# Patient Record
Sex: MALE | Race: BLACK OR AFRICAN AMERICAN | NOT HISPANIC OR LATINO
[De-identification: names, ages, dates, MRNs, and addresses within clinical notes are randomized per-mention and may not be internally consistent; named-entity substitution may affect disease eponyms.]

---

## 2016-09-04 RX ORDER — METFORMIN HYDROCHLORIDE 850 MG/1
2 TABLET ORAL
Qty: 0 | Refills: 0 | COMMUNITY
Start: 2016-09-04 | End: 2016-10-04

## 2017-01-25 ENCOUNTER — APPOINTMENT (OUTPATIENT)
Dept: INTERNAL MEDICINE | Facility: CLINIC | Age: 67
End: 2017-01-25

## 2017-01-25 VITALS — WEIGHT: 195 LBS | BODY MASS INDEX: 30.09 KG/M2 | DIASTOLIC BLOOD PRESSURE: 98 MMHG | SYSTOLIC BLOOD PRESSURE: 170 MMHG

## 2017-01-25 LAB — HBA1C MFR BLD HPLC: 9.1

## 2017-01-26 ENCOUNTER — RESULT REVIEW (OUTPATIENT)
Age: 67
End: 2017-01-26

## 2017-01-26 LAB
ALBUMIN SERPL ELPH-MCNC: 4.6 G/DL
ALP BLD-CCNC: 151 U/L
ALT SERPL-CCNC: 74 U/L
AMYLASE/CREAT SERPL: 90 U/L
ANION GAP SERPL CALC-SCNC: 22 MMOL/L
AST SERPL-CCNC: 163 U/L
BILIRUB DIRECT SERPL-MCNC: 0.2 MG/DL
BILIRUB INDIRECT SERPL-MCNC: 0.4 MG/DL
BILIRUB SERPL-MCNC: 0.6 MG/DL
BUN SERPL-MCNC: 11 MG/DL
CALCIUM SERPL-MCNC: 10.2 MG/DL
CHLORIDE SERPL-SCNC: 93 MMOL/L
CHOLEST SERPL-MCNC: 288 MG/DL
CHOLEST/HDLC SERPL: 6.9 RATIO
CO2 SERPL-SCNC: 19 MMOL/L
CREAT SERPL-MCNC: 1.38 MG/DL
GLUCOSE SERPL-MCNC: 323 MG/DL
HDLC SERPL-MCNC: 42 MG/DL
LDLC SERPL CALC-MCNC: 183 MG/DL
LPL SERPL-CCNC: 88 U/L
POTASSIUM SERPL-SCNC: 4.3 MMOL/L
PROT SERPL-MCNC: 9 G/DL
SODIUM SERPL-SCNC: 134 MMOL/L
TRIGL SERPL-MCNC: 317 MG/DL
TSH SERPL-ACNC: 1.7 UIU/ML

## 2017-01-30 ENCOUNTER — RX RENEWAL (OUTPATIENT)
Age: 67
End: 2017-01-30

## 2017-01-31 ENCOUNTER — APPOINTMENT (OUTPATIENT)
Dept: INTERNAL MEDICINE | Facility: CLINIC | Age: 67
End: 2017-01-31

## 2017-02-01 ENCOUNTER — APPOINTMENT (OUTPATIENT)
Dept: INTERNAL MEDICINE | Facility: CLINIC | Age: 67
End: 2017-02-01

## 2017-02-23 ENCOUNTER — RESULT REVIEW (OUTPATIENT)
Age: 67
End: 2017-02-23

## 2017-02-23 ENCOUNTER — APPOINTMENT (OUTPATIENT)
Dept: INTERNAL MEDICINE | Facility: CLINIC | Age: 67
End: 2017-02-23

## 2017-02-23 ENCOUNTER — RX RENEWAL (OUTPATIENT)
Age: 67
End: 2017-02-23

## 2017-02-23 VITALS
SYSTOLIC BLOOD PRESSURE: 142 MMHG | BODY MASS INDEX: 30.25 KG/M2 | WEIGHT: 195 LBS | HEIGHT: 67.5 IN | DIASTOLIC BLOOD PRESSURE: 70 MMHG

## 2017-02-23 LAB — HBA1C MFR BLD HPLC: 9.1

## 2017-02-24 ENCOUNTER — RESULT REVIEW (OUTPATIENT)
Age: 67
End: 2017-02-24

## 2017-02-24 LAB
ALBUMIN SERPL ELPH-MCNC: 4.4 G/DL
ALP BLD-CCNC: 144 U/L
ALT SERPL-CCNC: 89 U/L
ANION GAP SERPL CALC-SCNC: 17 MMOL/L
AST SERPL-CCNC: 176 U/L
BASOPHILS # BLD AUTO: 0.06 K/UL
BASOPHILS NFR BLD AUTO: 0.6 %
BILIRUB DIRECT SERPL-MCNC: 0.2 MG/DL
BILIRUB INDIRECT SERPL-MCNC: 0.4 MG/DL
BILIRUB SERPL-MCNC: 0.6 MG/DL
BUN SERPL-MCNC: 14 MG/DL
CALCIUM SERPL-MCNC: 10.4 MG/DL
CHLORIDE SERPL-SCNC: 96 MMOL/L
CHOLEST SERPL-MCNC: 277 MG/DL
CHOLEST/HDLC SERPL: 6 RATIO
CO2 SERPL-SCNC: 23 MMOL/L
CREAT SERPL-MCNC: 1.22 MG/DL
EOSINOPHIL # BLD AUTO: 0.29 K/UL
EOSINOPHIL NFR BLD AUTO: 2.7 %
GLUCOSE SERPL-MCNC: 208 MG/DL
HCT VFR BLD CALC: 43.5 %
HDLC SERPL-MCNC: 46 MG/DL
HGB BLD-MCNC: 14.4 G/DL
IMM GRANULOCYTES NFR BLD AUTO: 0.4 %
LDLC SERPL CALC-MCNC: 176 MG/DL
LYMPHOCYTES # BLD AUTO: 3.28 K/UL
LYMPHOCYTES NFR BLD AUTO: 30.8 %
MAN DIFF?: NORMAL
MCHC RBC-ENTMCNC: 33.1 GM/DL
MCHC RBC-ENTMCNC: 33.5 PG
MCV RBC AUTO: 101.2 FL
MONOCYTES # BLD AUTO: 0.74 K/UL
MONOCYTES NFR BLD AUTO: 7 %
NEUTROPHILS # BLD AUTO: 6.23 K/UL
NEUTROPHILS NFR BLD AUTO: 58.5 %
PLATELET # BLD AUTO: 262 K/UL
POTASSIUM SERPL-SCNC: 4.8 MMOL/L
PROT SERPL-MCNC: 9 G/DL
RBC # BLD: 4.3 M/UL
RBC # FLD: 13.8 %
SODIUM SERPL-SCNC: 136 MMOL/L
TRIGL SERPL-MCNC: 274 MG/DL
TSH SERPL-ACNC: 1.89 UIU/ML
WBC # FLD AUTO: 10.64 K/UL

## 2017-03-09 ENCOUNTER — RX RENEWAL (OUTPATIENT)
Age: 67
End: 2017-03-09

## 2017-05-03 ENCOUNTER — APPOINTMENT (OUTPATIENT)
Dept: PULMONOLOGY | Facility: CLINIC | Age: 67
End: 2017-05-03

## 2017-07-03 ENCOUNTER — INPATIENT (INPATIENT)
Facility: HOSPITAL | Age: 67
LOS: 2 days | Discharge: HOME | DRG: 440 | End: 2017-07-06
Attending: INTERNAL MEDICINE | Admitting: FAMILY MEDICINE
Payer: MEDICARE

## 2017-07-03 VITALS
HEIGHT: 68 IN | DIASTOLIC BLOOD PRESSURE: 87 MMHG | WEIGHT: 184.97 LBS | TEMPERATURE: 97 F | OXYGEN SATURATION: 97 % | RESPIRATION RATE: 24 BRPM | HEART RATE: 92 BPM | SYSTOLIC BLOOD PRESSURE: 145 MMHG

## 2017-07-03 DIAGNOSIS — K85.90 ACUTE PANCREATITIS WITHOUT NECROSIS OR INFECTION, UNSPECIFIED: ICD-10-CM

## 2017-07-03 LAB
ALBUMIN SERPL ELPH-MCNC: 4.1 G/DL — SIGNIFICANT CHANGE UP (ref 3.3–5.2)
ALBUMIN SERPL ELPH-MCNC: 4.5 G/DL — SIGNIFICANT CHANGE UP (ref 3.3–5.2)
ALP SERPL-CCNC: 118 U/L — SIGNIFICANT CHANGE UP (ref 40–120)
ALP SERPL-CCNC: 128 U/L — HIGH (ref 40–120)
ALT FLD-CCNC: 52 U/L — HIGH
ALT FLD-CCNC: 59 U/L — HIGH
ANION GAP SERPL CALC-SCNC: 15 MMOL/L — SIGNIFICANT CHANGE UP (ref 5–17)
AST SERPL-CCNC: 74 U/L — HIGH
AST SERPL-CCNC: 90 U/L — HIGH
BASOPHILS # BLD AUTO: 0 K/UL — SIGNIFICANT CHANGE UP (ref 0–0.2)
BASOPHILS # BLD AUTO: 0.1 K/UL — SIGNIFICANT CHANGE UP (ref 0–0.2)
BASOPHILS NFR BLD AUTO: 0.2 % — SIGNIFICANT CHANGE UP (ref 0–2)
BASOPHILS NFR BLD AUTO: 0.6 % — SIGNIFICANT CHANGE UP (ref 0–2)
BILIRUB DIRECT SERPL-MCNC: 0.2 MG/DL — SIGNIFICANT CHANGE UP (ref 0–0.3)
BILIRUB INDIRECT FLD-MCNC: 0.4 MG/DL — SIGNIFICANT CHANGE UP (ref 0.2–1)
BILIRUB SERPL-MCNC: 0.4 MG/DL — SIGNIFICANT CHANGE UP (ref 0.4–2)
BILIRUB SERPL-MCNC: 0.6 MG/DL — SIGNIFICANT CHANGE UP (ref 0.4–2)
BUN SERPL-MCNC: 12 MG/DL — SIGNIFICANT CHANGE UP (ref 8–20)
CALCIUM SERPL-MCNC: 9.6 MG/DL — SIGNIFICANT CHANGE UP (ref 8.6–10.2)
CHLORIDE SERPL-SCNC: 96 MMOL/L — LOW (ref 98–107)
CO2 SERPL-SCNC: 24 MMOL/L — SIGNIFICANT CHANGE UP (ref 22–29)
CREAT SERPL-MCNC: 0.84 MG/DL — SIGNIFICANT CHANGE UP (ref 0.5–1.3)
EOSINOPHIL # BLD AUTO: 0.1 K/UL — SIGNIFICANT CHANGE UP (ref 0–0.5)
EOSINOPHIL # BLD AUTO: 0.5 K/UL — SIGNIFICANT CHANGE UP (ref 0–0.5)
EOSINOPHIL NFR BLD AUTO: 0.7 % — SIGNIFICANT CHANGE UP (ref 0–5)
EOSINOPHIL NFR BLD AUTO: 5.5 % — HIGH (ref 0–5)
GLUCOSE SERPL-MCNC: 190 MG/DL — HIGH (ref 70–115)
HCT VFR BLD CALC: 42.7 % — SIGNIFICANT CHANGE UP (ref 42–52)
HCT VFR BLD CALC: 43.8 % — SIGNIFICANT CHANGE UP (ref 42–52)
HGB BLD-MCNC: 14.9 G/DL — SIGNIFICANT CHANGE UP (ref 14–18)
HGB BLD-MCNC: 15.2 G/DL — SIGNIFICANT CHANGE UP (ref 14–18)
LIDOCAIN IGE QN: 788 U/L — HIGH (ref 22–51)
LYMPHOCYTES # BLD AUTO: 1.6 K/UL — SIGNIFICANT CHANGE UP (ref 1–4.8)
LYMPHOCYTES # BLD AUTO: 14.6 % — LOW (ref 20–55)
LYMPHOCYTES # BLD AUTO: 2.8 K/UL — SIGNIFICANT CHANGE UP (ref 1–4.8)
LYMPHOCYTES # BLD AUTO: 30.5 % — SIGNIFICANT CHANGE UP (ref 20–55)
MCHC RBC-ENTMCNC: 33.3 PG — HIGH (ref 27–31)
MCHC RBC-ENTMCNC: 33.4 PG — HIGH (ref 27–31)
MCHC RBC-ENTMCNC: 34.9 G/DL — SIGNIFICANT CHANGE UP (ref 32–36)
MCHC RBC-ENTMCNC: 34.9 G/DL — SIGNIFICANT CHANGE UP (ref 32–36)
MCV RBC AUTO: 95.5 FL — HIGH (ref 80–94)
MCV RBC AUTO: 95.6 FL — HIGH (ref 80–94)
MONOCYTES # BLD AUTO: 0.5 K/UL — SIGNIFICANT CHANGE UP (ref 0–0.8)
MONOCYTES # BLD AUTO: 0.7 K/UL — SIGNIFICANT CHANGE UP (ref 0–0.8)
MONOCYTES NFR BLD AUTO: 4.8 % — SIGNIFICANT CHANGE UP (ref 3–10)
MONOCYTES NFR BLD AUTO: 7.7 % — SIGNIFICANT CHANGE UP (ref 3–10)
NEUTROPHILS # BLD AUTO: 5.1 K/UL — SIGNIFICANT CHANGE UP (ref 1.8–8)
NEUTROPHILS # BLD AUTO: 8.5 K/UL — HIGH (ref 1.8–8)
NEUTROPHILS NFR BLD AUTO: 55.4 % — SIGNIFICANT CHANGE UP (ref 37–73)
NEUTROPHILS NFR BLD AUTO: 79.4 % — HIGH (ref 37–73)
PLATELET # BLD AUTO: 224 K/UL — SIGNIFICANT CHANGE UP (ref 150–400)
PLATELET # BLD AUTO: 263 K/UL — SIGNIFICANT CHANGE UP (ref 150–400)
POTASSIUM SERPL-MCNC: 4.6 MMOL/L — SIGNIFICANT CHANGE UP (ref 3.5–5.3)
POTASSIUM SERPL-SCNC: 4.6 MMOL/L — SIGNIFICANT CHANGE UP (ref 3.5–5.3)
PROT SERPL-MCNC: 8.3 G/DL — SIGNIFICANT CHANGE UP (ref 6.6–8.7)
PROT SERPL-MCNC: 9 G/DL — HIGH (ref 6.6–8.7)
RBC # BLD: 4.47 M/UL — LOW (ref 4.6–6.2)
RBC # BLD: 4.58 M/UL — LOW (ref 4.6–6.2)
RBC # FLD: 13 % — SIGNIFICANT CHANGE UP (ref 11–15.6)
RBC # FLD: 13.2 % — SIGNIFICANT CHANGE UP (ref 11–15.6)
SODIUM SERPL-SCNC: 135 MMOL/L — SIGNIFICANT CHANGE UP (ref 135–145)
TROPONIN T SERPL-MCNC: <0.01 NG/ML — SIGNIFICANT CHANGE UP (ref 0–0.06)
WBC # BLD: 10.6 K/UL — SIGNIFICANT CHANGE UP (ref 4.8–10.8)
WBC # BLD: 9.3 K/UL — SIGNIFICANT CHANGE UP (ref 4.8–10.8)
WBC # FLD AUTO: 10.6 K/UL — SIGNIFICANT CHANGE UP (ref 4.8–10.8)
WBC # FLD AUTO: 9.3 K/UL — SIGNIFICANT CHANGE UP (ref 4.8–10.8)

## 2017-07-03 PROCEDURE — 71020: CPT | Mod: 26

## 2017-07-03 PROCEDURE — 93010 ELECTROCARDIOGRAM REPORT: CPT

## 2017-07-03 PROCEDURE — 74177 CT ABD & PELVIS W/CONTRAST: CPT | Mod: 26

## 2017-07-03 PROCEDURE — 99285 EMERGENCY DEPT VISIT HI MDM: CPT | Mod: 25

## 2017-07-03 PROCEDURE — 99223 1ST HOSP IP/OBS HIGH 75: CPT

## 2017-07-03 RX ORDER — AMLODIPINE BESYLATE 2.5 MG/1
10 TABLET ORAL DAILY
Qty: 0 | Refills: 0 | Status: DISCONTINUED | OUTPATIENT
Start: 2017-07-03 | End: 2017-07-06

## 2017-07-03 RX ORDER — DEXTROSE 50 % IN WATER 50 %
12.5 SYRINGE (ML) INTRAVENOUS ONCE
Qty: 0 | Refills: 0 | Status: DISCONTINUED | OUTPATIENT
Start: 2017-07-03 | End: 2017-07-06

## 2017-07-03 RX ORDER — FOLIC ACID 0.8 MG
1 TABLET ORAL DAILY
Qty: 0 | Refills: 0 | Status: DISCONTINUED | OUTPATIENT
Start: 2017-07-03 | End: 2017-07-06

## 2017-07-03 RX ORDER — HYDROMORPHONE HYDROCHLORIDE 2 MG/ML
1 INJECTION INTRAMUSCULAR; INTRAVENOUS; SUBCUTANEOUS ONCE
Qty: 0 | Refills: 0 | Status: DISCONTINUED | OUTPATIENT
Start: 2017-07-03 | End: 2017-07-03

## 2017-07-03 RX ORDER — GLUCAGON INJECTION, SOLUTION 0.5 MG/.1ML
1 INJECTION, SOLUTION SUBCUTANEOUS ONCE
Qty: 0 | Refills: 0 | Status: DISCONTINUED | OUTPATIENT
Start: 2017-07-03 | End: 2017-07-06

## 2017-07-03 RX ORDER — METOCLOPRAMIDE HCL 10 MG
10 TABLET ORAL ONCE
Qty: 0 | Refills: 0 | Status: COMPLETED | OUTPATIENT
Start: 2017-07-03 | End: 2017-07-03

## 2017-07-03 RX ORDER — MORPHINE SULFATE 50 MG/1
4 CAPSULE, EXTENDED RELEASE ORAL ONCE
Qty: 0 | Refills: 0 | Status: DISCONTINUED | OUTPATIENT
Start: 2017-07-03 | End: 2017-07-03

## 2017-07-03 RX ORDER — ENOXAPARIN SODIUM 100 MG/ML
40 INJECTION SUBCUTANEOUS EVERY 24 HOURS
Qty: 0 | Refills: 0 | Status: DISCONTINUED | OUTPATIENT
Start: 2017-07-03 | End: 2017-07-06

## 2017-07-03 RX ORDER — FAMOTIDINE 10 MG/ML
20 INJECTION INTRAVENOUS ONCE
Qty: 0 | Refills: 0 | Status: COMPLETED | OUTPATIENT
Start: 2017-07-03 | End: 2017-07-03

## 2017-07-03 RX ORDER — SODIUM CHLORIDE 9 MG/ML
1000 INJECTION, SOLUTION INTRAVENOUS
Qty: 0 | Refills: 0 | Status: DISCONTINUED | OUTPATIENT
Start: 2017-07-03 | End: 2017-07-06

## 2017-07-03 RX ORDER — THIAMINE MONONITRATE (VIT B1) 100 MG
100 TABLET ORAL DAILY
Qty: 0 | Refills: 0 | Status: DISCONTINUED | OUTPATIENT
Start: 2017-07-03 | End: 2017-07-06

## 2017-07-03 RX ORDER — PANTOPRAZOLE SODIUM 20 MG/1
40 TABLET, DELAYED RELEASE ORAL
Qty: 0 | Refills: 0 | Status: DISCONTINUED | OUTPATIENT
Start: 2017-07-03 | End: 2017-07-06

## 2017-07-03 RX ORDER — MORPHINE SULFATE 50 MG/1
4 CAPSULE, EXTENDED RELEASE ORAL EVERY 4 HOURS
Qty: 0 | Refills: 0 | Status: DISCONTINUED | OUTPATIENT
Start: 2017-07-03 | End: 2017-07-06

## 2017-07-03 RX ORDER — DEXTROSE 50 % IN WATER 50 %
1 SYRINGE (ML) INTRAVENOUS ONCE
Qty: 0 | Refills: 0 | Status: DISCONTINUED | OUTPATIENT
Start: 2017-07-03 | End: 2017-07-06

## 2017-07-03 RX ORDER — SODIUM CHLORIDE 9 MG/ML
1000 INJECTION INTRAMUSCULAR; INTRAVENOUS; SUBCUTANEOUS ONCE
Qty: 0 | Refills: 0 | Status: COMPLETED | OUTPATIENT
Start: 2017-07-03 | End: 2017-07-03

## 2017-07-03 RX ORDER — INSULIN LISPRO 100/ML
VIAL (ML) SUBCUTANEOUS
Qty: 0 | Refills: 0 | Status: DISCONTINUED | OUTPATIENT
Start: 2017-07-03 | End: 2017-07-06

## 2017-07-03 RX ORDER — IPRATROPIUM/ALBUTEROL SULFATE 18-103MCG
3 AEROSOL WITH ADAPTER (GRAM) INHALATION EVERY 6 HOURS
Qty: 0 | Refills: 0 | Status: DISCONTINUED | OUTPATIENT
Start: 2017-07-03 | End: 2017-07-06

## 2017-07-03 RX ORDER — SODIUM CHLORIDE 9 MG/ML
1000 INJECTION INTRAMUSCULAR; INTRAVENOUS; SUBCUTANEOUS
Qty: 0 | Refills: 0 | Status: DISCONTINUED | OUTPATIENT
Start: 2017-07-03 | End: 2017-07-05

## 2017-07-03 RX ORDER — SODIUM CHLORIDE 9 MG/ML
3 INJECTION INTRAMUSCULAR; INTRAVENOUS; SUBCUTANEOUS ONCE
Qty: 0 | Refills: 0 | Status: COMPLETED | OUTPATIENT
Start: 2017-07-03 | End: 2017-07-03

## 2017-07-03 RX ADMIN — ENOXAPARIN SODIUM 40 MILLIGRAM(S): 100 INJECTION SUBCUTANEOUS at 11:13

## 2017-07-03 RX ADMIN — MORPHINE SULFATE 4 MILLIGRAM(S): 50 CAPSULE, EXTENDED RELEASE ORAL at 17:28

## 2017-07-03 RX ADMIN — Medication 1 TABLET(S): at 11:13

## 2017-07-03 RX ADMIN — MORPHINE SULFATE 4 MILLIGRAM(S): 50 CAPSULE, EXTENDED RELEASE ORAL at 21:58

## 2017-07-03 RX ADMIN — HYDROMORPHONE HYDROCHLORIDE 1 MILLIGRAM(S): 2 INJECTION INTRAMUSCULAR; INTRAVENOUS; SUBCUTANEOUS at 05:32

## 2017-07-03 RX ADMIN — Medication 100 MILLIGRAM(S): at 11:24

## 2017-07-03 RX ADMIN — Medication 3 MILLILITER(S): at 20:05

## 2017-07-03 RX ADMIN — HYDROMORPHONE HYDROCHLORIDE 1 MILLIGRAM(S): 2 INJECTION INTRAMUSCULAR; INTRAVENOUS; SUBCUTANEOUS at 05:24

## 2017-07-03 RX ADMIN — SODIUM CHLORIDE 150 MILLILITER(S): 9 INJECTION INTRAMUSCULAR; INTRAVENOUS; SUBCUTANEOUS at 06:58

## 2017-07-03 RX ADMIN — MORPHINE SULFATE 4 MILLIGRAM(S): 50 CAPSULE, EXTENDED RELEASE ORAL at 11:54

## 2017-07-03 RX ADMIN — MORPHINE SULFATE 4 MILLIGRAM(S): 50 CAPSULE, EXTENDED RELEASE ORAL at 13:17

## 2017-07-03 RX ADMIN — SODIUM CHLORIDE 1000 MILLILITER(S): 9 INJECTION INTRAMUSCULAR; INTRAVENOUS; SUBCUTANEOUS at 05:23

## 2017-07-03 RX ADMIN — SODIUM CHLORIDE 150 MILLILITER(S): 9 INJECTION INTRAMUSCULAR; INTRAVENOUS; SUBCUTANEOUS at 22:00

## 2017-07-03 RX ADMIN — MORPHINE SULFATE 4 MILLIGRAM(S): 50 CAPSULE, EXTENDED RELEASE ORAL at 13:55

## 2017-07-03 RX ADMIN — PANTOPRAZOLE SODIUM 40 MILLIGRAM(S): 20 TABLET, DELAYED RELEASE ORAL at 11:24

## 2017-07-03 RX ADMIN — SODIUM CHLORIDE 3 MILLILITER(S): 9 INJECTION INTRAMUSCULAR; INTRAVENOUS; SUBCUTANEOUS at 05:24

## 2017-07-03 RX ADMIN — Medication 10 MILLIGRAM(S): at 05:23

## 2017-07-03 RX ADMIN — Medication 3 MILLILITER(S): at 15:01

## 2017-07-03 RX ADMIN — MORPHINE SULFATE 4 MILLIGRAM(S): 50 CAPSULE, EXTENDED RELEASE ORAL at 11:16

## 2017-07-03 RX ADMIN — MORPHINE SULFATE 4 MILLIGRAM(S): 50 CAPSULE, EXTENDED RELEASE ORAL at 22:31

## 2017-07-03 RX ADMIN — Medication 1 MILLIGRAM(S): at 11:13

## 2017-07-03 RX ADMIN — AMLODIPINE BESYLATE 10 MILLIGRAM(S): 2.5 TABLET ORAL at 11:13

## 2017-07-03 RX ADMIN — FAMOTIDINE 20 MILLIGRAM(S): 10 INJECTION INTRAVENOUS at 05:23

## 2017-07-03 RX ADMIN — HYDROMORPHONE HYDROCHLORIDE 1 MILLIGRAM(S): 2 INJECTION INTRAMUSCULAR; INTRAVENOUS; SUBCUTANEOUS at 06:57

## 2017-07-03 NOTE — H&P ADULT - HISTORY OF PRESENT ILLNESS
66 yo male with PMH of HTN, DM, COPD, Hx of pancreatitis, last episode was in September 2016, Pt drinks alcohol on reguar basis, , Pt reports he was drinking alcohol yesterday with his wife, he started having epigastric pain radiating to back associated with nauseated feeling, Pt denies chest pain, headache, urinary problem, V/D,

## 2017-07-03 NOTE — ED ADULT NURSE NOTE - OBJECTIVE STATEMENT
pt here with abd pain he states has pancreatitis and he drunk too much, abd distended and sensitive to touch to epigestirc area radiating to left upper quadrant

## 2017-07-03 NOTE — ED ADULT NURSE NOTE - PMH
Alcohol abuse, daily use  Daily Use since 2010  Alcohol-induced acute pancreatitis, unspecified complication status    Diabetes mellitus    Emphysema    Hypertension    Lumbar disc herniation  L4-L5  Pancreatitis

## 2017-07-03 NOTE — ED PROVIDER NOTE - MEDICAL DECISION MAKING DETAILS
epigastric pain  - h&p suggest pancreatitis  - check labs, CT, and re-eavluate after iv fluids and meds

## 2017-07-03 NOTE — ED PROVIDER NOTE - CONSTITUTIONAL, MLM
normal... Well appearing, well nourished, awake, alert, oriented to person, place, time/situation. pt in pain.

## 2017-07-03 NOTE — ED PROVIDER NOTE - PROGRESS NOTE DETAILS
Pt signed out to me at 7 AM by Dr. Lowe to f/u CT abdomen and reassess. Pt still c/o significant pain and has TTP in abdomen.  Plan to admit.  PMD Ventura

## 2017-07-03 NOTE — H&P ADULT - ASSESSMENT
66 yo male with PMH of HTN, DM, COPD, Hx of pancreatitis, last episode was in September 2016, Pt drinks alcohol on reguar basis, , Pt reports he was drinking alcohol yesterday with his wife, he started having epigastric pain radiating to back associated with nauseated feeling, Pt denies chest pain, headache, urinary problem, V/D,     1- Acute Pancreatitis  CT abdomin- acute pancreatitis, Lipase- 788, c/w ivf and morphine for pain.  repeat labs    2-DM  Hold off Metfomin for now, RISS, check glucose    3-HTN  Pt reports he takes amlodpine 10mg, c/w amlodipine    4-COPD-  C/W Dueneb     DVT prohylaxis- Lovenox, and GI prophylaxis

## 2017-07-03 NOTE — SBIRT NOTE. - NSSBIRTSERVICES_GEN_A_ED_FT
Provided SBIRT services: Full screen Negative. Positive reinforcement provided given patient currently within healthy guidelines. Education materials reviewed and given to patient.  AUDIT Score:  11  DAST Score:  0  Duration=  15 Minutes Provided SBIRT services: Full screen positive. Brief Intervention Performed. Screening results were reviewed with the patient and patient was provided information about healthy guidelines and potential negative consequences associated with level of risk. Motivation and readiness to reduce or stop use was discussed and goals and activities to make changes were suggested/offered.    AUDIT Score:  11  DAST Score:  0  Duration=  15 Minutes

## 2017-07-03 NOTE — ED PROVIDER NOTE - OBJECTIVE STATEMENT
68 yo male c/o epigastric pain radiating to back + n/v.   symptoms started yesterday after drinking alcohol.  similar episodes in past - diagnosed as pancreatitis.

## 2017-07-03 NOTE — H&P ADULT - NSHPPHYSICALEXAM_GEN_ALL_CORE
PHYSICAL EXAM:  Vital Signs Last 24 Hrs  T(C): 36.2 (03 Jul 2017 10:13), Max: 36.3 (03 Jul 2017 04:32)  T(F): 97.2 (03 Jul 2017 10:13), Max: 97.4 (03 Jul 2017 04:32)  HR: 89 (03 Jul 2017 10:13) (89 - 92)  BP: 146/80 (03 Jul 2017 10:13) (145/87 - 146/80)  BP(mean): --  RR: 18 (03 Jul 2017 10:13) (18 - 24)  SpO2: 98% (03 Jul 2017 10:13) (97% - 98%)  GENERAL: NAD, well-groomed, well-developed  HEAD:  Atraumatic, Normocephalic  EYES: EOMI, PERRLA, conjunctiva and sclera clear  ENMT: No tonsillar erythema, exudates, or enlargement; Moist mucous membranes, Good dentition, No lesions  NECK: Supple, No JVD, Normal thyroid  NERVOUS SYSTEM:  Alert & Oriented X3, Good concentration; Motor Strength 5/5 B/L upper and lower extremities; DTRs 2+ intact and symmetric  CHEST/LUNG: Clear to percussion bilaterally; No rales, rhonchi, wheezing, or rubs  HEART: Regular rate and rhythm; No murmurs, rubs, or gallops  ABDOMEN: Soft, tenderness + in mid abdominal area, no rebound tenderness . BS+  EXTREMITIES:  2+ Peripheral Pulses, No clubbing, cyanosis, or edema  LYMPH: No lymphadenopathy noted  SKIN: No rashes or lesions

## 2017-07-03 NOTE — ED PROVIDER NOTE - CARE PLAN
Principal Discharge DX:	Pancreatitis  Secondary Diagnosis:	Alcohol-induced acute pancreatitis, unspecified complication status

## 2017-07-04 DIAGNOSIS — J43.9 EMPHYSEMA, UNSPECIFIED: ICD-10-CM

## 2017-07-04 DIAGNOSIS — E11.49 TYPE 2 DIABETES MELLITUS WITH OTHER DIABETIC NEUROLOGICAL COMPLICATION: ICD-10-CM

## 2017-07-04 DIAGNOSIS — K85.20 ALCOHOL INDUCED ACUTE PANCREATITIS WITHOUT NECROSIS OR INFECTION: ICD-10-CM

## 2017-07-04 DIAGNOSIS — I10 ESSENTIAL (PRIMARY) HYPERTENSION: ICD-10-CM

## 2017-07-04 DIAGNOSIS — F10.10 ALCOHOL ABUSE, UNCOMPLICATED: ICD-10-CM

## 2017-07-04 LAB
ALBUMIN SERPL ELPH-MCNC: 3.7 G/DL — SIGNIFICANT CHANGE UP (ref 3.3–5.2)
ALP SERPL-CCNC: 110 U/L — SIGNIFICANT CHANGE UP (ref 40–120)
ALT FLD-CCNC: 41 U/L — HIGH
AMYLASE P1 CFR SERPL: 711 U/L — HIGH (ref 36–128)
ANION GAP SERPL CALC-SCNC: 15 MMOL/L — SIGNIFICANT CHANGE UP (ref 5–17)
AST SERPL-CCNC: 58 U/L — HIGH
BILIRUB SERPL-MCNC: 0.8 MG/DL — SIGNIFICANT CHANGE UP (ref 0.4–2)
BUN SERPL-MCNC: 10 MG/DL — SIGNIFICANT CHANGE UP (ref 8–20)
CALCIUM SERPL-MCNC: 8.7 MG/DL — SIGNIFICANT CHANGE UP (ref 8.6–10.2)
CHLORIDE SERPL-SCNC: 104 MMOL/L — SIGNIFICANT CHANGE UP (ref 98–107)
CO2 SERPL-SCNC: 21 MMOL/L — LOW (ref 22–29)
CREAT SERPL-MCNC: 0.77 MG/DL — SIGNIFICANT CHANGE UP (ref 0.5–1.3)
GLUCOSE SERPL-MCNC: 161 MG/DL — HIGH (ref 70–115)
HBA1C BLD-MCNC: 7.1 % — HIGH (ref 4–5.6)
HCT VFR BLD CALC: 43.3 % — SIGNIFICANT CHANGE UP (ref 42–52)
HGB BLD-MCNC: 14.5 G/DL — SIGNIFICANT CHANGE UP (ref 14–18)
LIDOCAIN IGE QN: 1421 U/L — HIGH (ref 22–51)
MCHC RBC-ENTMCNC: 32.3 PG — HIGH (ref 27–31)
MCHC RBC-ENTMCNC: 33.5 G/DL — SIGNIFICANT CHANGE UP (ref 32–36)
MCV RBC AUTO: 96.4 FL — HIGH (ref 80–94)
PLATELET # BLD AUTO: 212 K/UL — SIGNIFICANT CHANGE UP (ref 150–400)
POTASSIUM SERPL-MCNC: 3.8 MMOL/L — SIGNIFICANT CHANGE UP (ref 3.5–5.3)
POTASSIUM SERPL-SCNC: 3.8 MMOL/L — SIGNIFICANT CHANGE UP (ref 3.5–5.3)
PROT SERPL-MCNC: 7.7 G/DL — SIGNIFICANT CHANGE UP (ref 6.6–8.7)
RBC # BLD: 4.49 M/UL — LOW (ref 4.6–6.2)
RBC # FLD: 13.1 % — SIGNIFICANT CHANGE UP (ref 11–15.6)
SODIUM SERPL-SCNC: 140 MMOL/L — SIGNIFICANT CHANGE UP (ref 135–145)
WBC # BLD: 11.3 K/UL — HIGH (ref 4.8–10.8)
WBC # FLD AUTO: 11.3 K/UL — HIGH (ref 4.8–10.8)

## 2017-07-04 PROCEDURE — 99233 SBSQ HOSP IP/OBS HIGH 50: CPT

## 2017-07-04 RX ORDER — ACETAMINOPHEN 500 MG
650 TABLET ORAL EVERY 6 HOURS
Qty: 0 | Refills: 0 | Status: DISCONTINUED | OUTPATIENT
Start: 2017-07-04 | End: 2017-07-06

## 2017-07-04 RX ADMIN — MORPHINE SULFATE 4 MILLIGRAM(S): 50 CAPSULE, EXTENDED RELEASE ORAL at 19:58

## 2017-07-04 RX ADMIN — MORPHINE SULFATE 4 MILLIGRAM(S): 50 CAPSULE, EXTENDED RELEASE ORAL at 08:23

## 2017-07-04 RX ADMIN — Medication 3 MILLILITER(S): at 08:29

## 2017-07-04 RX ADMIN — MORPHINE SULFATE 4 MILLIGRAM(S): 50 CAPSULE, EXTENDED RELEASE ORAL at 20:20

## 2017-07-04 RX ADMIN — Medication 3 MILLILITER(S): at 20:44

## 2017-07-04 RX ADMIN — Medication 650 MILLIGRAM(S): at 11:30

## 2017-07-04 RX ADMIN — PANTOPRAZOLE SODIUM 40 MILLIGRAM(S): 20 TABLET, DELAYED RELEASE ORAL at 08:23

## 2017-07-04 RX ADMIN — SODIUM CHLORIDE 150 MILLILITER(S): 9 INJECTION INTRAMUSCULAR; INTRAVENOUS; SUBCUTANEOUS at 05:59

## 2017-07-04 RX ADMIN — AMLODIPINE BESYLATE 10 MILLIGRAM(S): 2.5 TABLET ORAL at 05:59

## 2017-07-04 RX ADMIN — Medication 3 MILLILITER(S): at 03:52

## 2017-07-04 RX ADMIN — MORPHINE SULFATE 4 MILLIGRAM(S): 50 CAPSULE, EXTENDED RELEASE ORAL at 13:49

## 2017-07-04 RX ADMIN — MORPHINE SULFATE 4 MILLIGRAM(S): 50 CAPSULE, EXTENDED RELEASE ORAL at 12:59

## 2017-07-04 RX ADMIN — Medication 3 MILLILITER(S): at 15:28

## 2017-07-04 RX ADMIN — HYDROMORPHONE HYDROCHLORIDE 1 MILLIGRAM(S): 2 INJECTION INTRAMUSCULAR; INTRAVENOUS; SUBCUTANEOUS at 08:10

## 2017-07-04 NOTE — ED ADULT NURSE REASSESSMENT NOTE - NS ED NURSE REASSESS COMMENT FT1
assumed pt care this am. pt alert, resting in bed, chart reviewed.
Patient received awake and alert x3 in cart.  Patient MARTIN.  Patient has no labored breathing, patient is in no acute distress.  Patient 10/10 abdominal pain and patient was just medicated with Morphine.  Patient has NS infusing at 100ml/hr into patent iv site in right ac.
Pt resting comfortably, easy arousability, offers no complaints at this time, reports feeling "much better",, safety and comfort measures in place.
Report recvd from off going RN, pt recvd sitting on stretcher, audible wheezing noted, RT called to bedside for treatment, pt reports understanding of POC, awaiting a bed assignment. Pt receiving respiratory treatment as ordered. Safety and comfort measures in place.
Patient resting in cart, awake and alert x3.  Patient has no labored breathing and is in no acute distress.

## 2017-07-05 LAB
AMYLASE P1 CFR SERPL: 236 U/L — HIGH (ref 36–128)
ANION GAP SERPL CALC-SCNC: 17 MMOL/L — SIGNIFICANT CHANGE UP (ref 5–17)
BUN SERPL-MCNC: 8 MG/DL — SIGNIFICANT CHANGE UP (ref 8–20)
CALCIUM SERPL-MCNC: 8.6 MG/DL — SIGNIFICANT CHANGE UP (ref 8.6–10.2)
CHLORIDE SERPL-SCNC: 103 MMOL/L — SIGNIFICANT CHANGE UP (ref 98–107)
CO2 SERPL-SCNC: 21 MMOL/L — LOW (ref 22–29)
CREAT SERPL-MCNC: 0.88 MG/DL — SIGNIFICANT CHANGE UP (ref 0.5–1.3)
GLUCOSE SERPL-MCNC: 140 MG/DL — HIGH (ref 70–115)
HCT VFR BLD CALC: 39.5 % — LOW (ref 42–52)
HGB BLD-MCNC: 13.5 G/DL — LOW (ref 14–18)
LIDOCAIN IGE QN: 201 U/L — HIGH (ref 22–51)
MAGNESIUM SERPL-MCNC: 2.1 MG/DL — SIGNIFICANT CHANGE UP (ref 1.6–2.6)
MCHC RBC-ENTMCNC: 33.2 PG — HIGH (ref 27–31)
MCHC RBC-ENTMCNC: 34.2 G/DL — SIGNIFICANT CHANGE UP (ref 32–36)
MCV RBC AUTO: 97.1 FL — HIGH (ref 80–94)
PHOSPHATE SERPL-MCNC: 2.4 MG/DL — SIGNIFICANT CHANGE UP (ref 2.4–4.7)
PLATELET # BLD AUTO: 227 K/UL — SIGNIFICANT CHANGE UP (ref 150–400)
POTASSIUM SERPL-MCNC: 3.6 MMOL/L — SIGNIFICANT CHANGE UP (ref 3.5–5.3)
POTASSIUM SERPL-SCNC: 3.6 MMOL/L — SIGNIFICANT CHANGE UP (ref 3.5–5.3)
RBC # BLD: 4.07 M/UL — LOW (ref 4.6–6.2)
RBC # FLD: 13.4 % — SIGNIFICANT CHANGE UP (ref 11–15.6)
SODIUM SERPL-SCNC: 141 MMOL/L — SIGNIFICANT CHANGE UP (ref 135–145)
WBC # BLD: 11.7 K/UL — HIGH (ref 4.8–10.8)
WBC # FLD AUTO: 11.7 K/UL — HIGH (ref 4.8–10.8)

## 2017-07-05 PROCEDURE — 99233 SBSQ HOSP IP/OBS HIGH 50: CPT

## 2017-07-05 RX ADMIN — MORPHINE SULFATE 4 MILLIGRAM(S): 50 CAPSULE, EXTENDED RELEASE ORAL at 23:10

## 2017-07-05 RX ADMIN — MORPHINE SULFATE 4 MILLIGRAM(S): 50 CAPSULE, EXTENDED RELEASE ORAL at 22:49

## 2017-07-05 RX ADMIN — MORPHINE SULFATE 4 MILLIGRAM(S): 50 CAPSULE, EXTENDED RELEASE ORAL at 09:55

## 2017-07-05 RX ADMIN — MORPHINE SULFATE 4 MILLIGRAM(S): 50 CAPSULE, EXTENDED RELEASE ORAL at 10:10

## 2017-07-05 RX ADMIN — Medication 3 MILLILITER(S): at 21:00

## 2017-07-05 RX ADMIN — ENOXAPARIN SODIUM 40 MILLIGRAM(S): 100 INJECTION SUBCUTANEOUS at 12:50

## 2017-07-05 RX ADMIN — MORPHINE SULFATE 4 MILLIGRAM(S): 50 CAPSULE, EXTENDED RELEASE ORAL at 06:00

## 2017-07-05 RX ADMIN — MORPHINE SULFATE 4 MILLIGRAM(S): 50 CAPSULE, EXTENDED RELEASE ORAL at 05:41

## 2017-07-05 RX ADMIN — Medication 3 MILLILITER(S): at 15:20

## 2017-07-05 RX ADMIN — Medication 3 MILLILITER(S): at 02:53

## 2017-07-05 RX ADMIN — Medication 3 MILLILITER(S): at 08:14

## 2017-07-06 VITALS
TEMPERATURE: 99 F | RESPIRATION RATE: 18 BRPM | HEART RATE: 97 BPM | SYSTOLIC BLOOD PRESSURE: 138 MMHG | OXYGEN SATURATION: 96 % | DIASTOLIC BLOOD PRESSURE: 86 MMHG

## 2017-07-06 LAB
ANION GAP SERPL CALC-SCNC: 15 MMOL/L — SIGNIFICANT CHANGE UP (ref 5–17)
BUN SERPL-MCNC: 8 MG/DL — SIGNIFICANT CHANGE UP (ref 8–20)
CALCIUM SERPL-MCNC: 8.7 MG/DL — SIGNIFICANT CHANGE UP (ref 8.6–10.2)
CHLORIDE SERPL-SCNC: 101 MMOL/L — SIGNIFICANT CHANGE UP (ref 98–107)
CO2 SERPL-SCNC: 22 MMOL/L — SIGNIFICANT CHANGE UP (ref 22–29)
CREAT SERPL-MCNC: 0.81 MG/DL — SIGNIFICANT CHANGE UP (ref 0.5–1.3)
GLUCOSE SERPL-MCNC: 135 MG/DL — HIGH (ref 70–115)
POTASSIUM SERPL-MCNC: 3.7 MMOL/L — SIGNIFICANT CHANGE UP (ref 3.5–5.3)
POTASSIUM SERPL-SCNC: 3.7 MMOL/L — SIGNIFICANT CHANGE UP (ref 3.5–5.3)
SODIUM SERPL-SCNC: 138 MMOL/L — SIGNIFICANT CHANGE UP (ref 135–145)

## 2017-07-06 PROCEDURE — 84100 ASSAY OF PHOSPHORUS: CPT

## 2017-07-06 PROCEDURE — 96375 TX/PRO/DX INJ NEW DRUG ADDON: CPT

## 2017-07-06 PROCEDURE — 85027 COMPLETE CBC AUTOMATED: CPT

## 2017-07-06 PROCEDURE — 83690 ASSAY OF LIPASE: CPT

## 2017-07-06 PROCEDURE — 82150 ASSAY OF AMYLASE: CPT

## 2017-07-06 PROCEDURE — 80076 HEPATIC FUNCTION PANEL: CPT

## 2017-07-06 PROCEDURE — 99285 EMERGENCY DEPT VISIT HI MDM: CPT | Mod: 25

## 2017-07-06 PROCEDURE — 93005 ELECTROCARDIOGRAM TRACING: CPT

## 2017-07-06 PROCEDURE — 71046 X-RAY EXAM CHEST 2 VIEWS: CPT

## 2017-07-06 PROCEDURE — 83036 HEMOGLOBIN GLYCOSYLATED A1C: CPT

## 2017-07-06 PROCEDURE — 83735 ASSAY OF MAGNESIUM: CPT

## 2017-07-06 PROCEDURE — 94640 AIRWAY INHALATION TREATMENT: CPT

## 2017-07-06 PROCEDURE — 80053 COMPREHEN METABOLIC PANEL: CPT

## 2017-07-06 PROCEDURE — 36415 COLL VENOUS BLD VENIPUNCTURE: CPT

## 2017-07-06 PROCEDURE — 84484 ASSAY OF TROPONIN QUANT: CPT

## 2017-07-06 PROCEDURE — 96374 THER/PROPH/DIAG INJ IV PUSH: CPT | Mod: XU

## 2017-07-06 PROCEDURE — 96376 TX/PRO/DX INJ SAME DRUG ADON: CPT

## 2017-07-06 PROCEDURE — 80048 BASIC METABOLIC PNL TOTAL CA: CPT

## 2017-07-06 PROCEDURE — 74177 CT ABD & PELVIS W/CONTRAST: CPT

## 2017-07-06 RX ORDER — FOLIC ACID 0.8 MG
1 TABLET ORAL
Qty: 0 | Refills: 0 | COMMUNITY
Start: 2017-07-06

## 2017-07-06 RX ORDER — AMLODIPINE BESYLATE 2.5 MG/1
1 TABLET ORAL
Qty: 0 | Refills: 0 | COMMUNITY
Start: 2017-07-06

## 2017-07-06 RX ORDER — THIAMINE MONONITRATE (VIT B1) 100 MG
1 TABLET ORAL
Qty: 0 | Refills: 0 | COMMUNITY
Start: 2017-07-06

## 2017-07-06 RX ADMIN — Medication 3 MILLILITER(S): at 08:30

## 2017-07-06 RX ADMIN — AMLODIPINE BESYLATE 10 MILLIGRAM(S): 2.5 TABLET ORAL at 05:28

## 2017-07-06 RX ADMIN — PANTOPRAZOLE SODIUM 40 MILLIGRAM(S): 20 TABLET, DELAYED RELEASE ORAL at 05:28

## 2017-07-06 NOTE — PROGRESS NOTE ADULT - ASSESSMENT
67 year old male pancreatitis, etoh abuse, copd

## 2017-07-06 NOTE — PROGRESS NOTE ADULT - PROBLEM SELECTOR PROBLEM 1
Alcohol-induced acute pancreatitis without infection or necrosis

## 2017-07-06 NOTE — DISCHARGE NOTE ADULT - CARE PROVIDER_API CALL
Meir Ventura), Internal Medicine  [address] [place] [address]  Phone: [phone]  Fax: [phone]

## 2017-07-06 NOTE — DISCHARGE NOTE ADULT - PLAN OF CARE
Avoid Pancreatitis I spoke to the patient at length regarding alcohol abuse. I suggested alcohol anonymous. I recommended an outpatient support group. Tolerable glycemia Follow up with Dr Ventura Stable respiration Follow up with Dr Hills Stable blood pressure

## 2017-07-06 NOTE — DISCHARGE NOTE ADULT - PATIENT PORTAL LINK FT
“You can access the Follow[facility] Patient Portal, offered by [facility], by registering with the following website: [email or web address]”

## 2017-07-06 NOTE — PROGRESS NOTE ADULT - PROBLEM SELECTOR PROBLEM 2
Type 2 diabetes mellitus with other neurologic complication

## 2017-07-06 NOTE — PROGRESS NOTE ADULT - PROBLEM SELECTOR PLAN 1
NPO, IV fluids, pain management  7/5/2017 Patient improved, dc ivf, advance diet.
NPO, IV fluids, pain management  7/5/2017 Patient improved, dc ivf, advance diet.  7/6/2017 Patient stable dc home  Follow upwith Dr Ventura
NPO  IV fluids  pain management

## 2017-07-06 NOTE — PROGRESS NOTE ADULT - SUBJECTIVE AND OBJECTIVE BOX
SANDRA CHARISSE     Chief Complaint: Patient is a 67y old  Male who presents with a chief complaint of Abdominal pain (03 Jul 2017 10:49)      PAST MEDICAL & SURGICAL HISTORY:  Alcohol-induced acute pancreatitis, unspecified complication status  Pancreatitis  Lumbar disc herniation: L4-L5  Diabetes mellitus  Alcohol abuse, daily use: Daily Use since 2010  Hypertension  Emphysema  No significant past surgical history      HPI/OVERNIGHT EVENTS: Patient with less abdominal pain    MEDICATIONS  (STANDING):  sodium chloride 0.9%. 1000 milliLiter(s) (150 mL/Hr) IV Continuous <Continuous>  enoxaparin Injectable 40 milliGRAM(s) SubCutaneous every 24 hours  folic acid 1 milliGRAM(s) Oral daily  multivitamin 1 Tablet(s) Oral daily  thiamine 100 milliGRAM(s) Oral daily  insulin lispro (HumaLOG) corrective regimen sliding scale   SubCutaneous three times a day before meals  dextrose 5%. 1000 milliLiter(s) (50 mL/Hr) IV Continuous <Continuous>  dextrose 50% Injectable 12.5 Gram(s) IV Push once  amLODIPine   Tablet 10 milliGRAM(s) Oral daily  ALBUTerol/ipratropium for Nebulization 3 milliLiter(s) Nebulizer every 6 hours  pantoprazole    Tablet 40 milliGRAM(s) Oral before breakfast      Vital Signs Last 24 Hrs  T(C): 37 (04 Jul 2017 09:18), Max: 37.4 (04 Jul 2017 00:00)  T(F): 98.6 (04 Jul 2017 09:18), Max: 99.4 (04 Jul 2017 04:39)  HR: 98 (04 Jul 2017 09:18) (78 - 122)  BP: 144/84 (04 Jul 2017 09:18) (123/71 - 144/88)  BP(mean): --  RR: 18 (04 Jul 2017 09:18) (18 - 20)  SpO2: 93% (04 Jul 2017 09:18) (92% - 95%)    PHYSICAL EXAM:  Constitutional: NAD, well-groomed, well-developed  HEENT: PERRLA, EOMI, Normal Hearing, MMM  Neck: No LAD, No JVD  Back: Normal spine flexure, No CVA tenderness  Respiratory: CTAB Cardiovascular: S1 and S2, RRR, no M/G/R  Gastrointestinal:  ascites  Extremities: No peripheral edema  Vascular: 2+ peripheral pulses  Neurological: A/O x 3, no focal deficits  Psychiatric: Normal mood, normal affect  Musculoskeletal: 5/5 strength b/l upper and lower extremities  Skin: No rashes    CAPILLARY BLOOD GLUCOSE  157 (04 Jul 2017 08:31)  128 (04 Jul 2017 00:55)  136 (03 Jul 2017 17:48)  176 (03 Jul 2017 11:45)    LABS:                        14.5   11.3  )-----------( 212      ( 04 Jul 2017 07:35 )             43.3     07-04    140  |  104  |  10.0  ----------------------------<  161<H>  3.8   |  21.0<L>  |  0.77    Ca    8.7      04 Jul 2017 07:35    TPro  7.7  /  Alb  3.7  /  TBili  0.8  /  DBili  x   /  AST  58<H>  /  ALT  41<H>  /  AlkPhos  110  07-04          RADIOLOGY & ADDITIONAL TESTS:
SANDRA MCQUEEN     Chief Complaint: Patient is a 67y old  Male who presents with a chief complaint of Abdominal pain (03 Jul 2017 10:49)      PAST MEDICAL & SURGICAL HISTORY:  Alcohol-induced acute pancreatitis, unspecified complication status  Pancreatitis  Lumbar disc herniation: L4-L5  Diabetes mellitus  Alcohol abuse, daily use: Daily Use since 2010  Hypertension  Emphysema  No significant past surgical history      HPI/OVERNIGHT EVENTS: Patient feeling much better, advance diet    MEDICATIONS  (STANDING):  enoxaparin Injectable 40 milliGRAM(s) SubCutaneous every 24 hours  folic acid 1 milliGRAM(s) Oral daily  multivitamin 1 Tablet(s) Oral daily  thiamine 100 milliGRAM(s) Oral daily  insulin lispro (HumaLOG) corrective regimen sliding scale   SubCutaneous three times a day before meals  dextrose 5%. 1000 milliLiter(s) (50 mL/Hr) IV Continuous <Continuous>  dextrose 50% Injectable 12.5 Gram(s) IV Push once  amLODIPine   Tablet 10 milliGRAM(s) Oral daily  ALBUTerol/ipratropium for Nebulization 3 milliLiter(s) Nebulizer every 6 hours  pantoprazole    Tablet 40 milliGRAM(s) Oral before breakfast      Vital Signs Last 24 Hrs  T(C): 36.2 (05 Jul 2017 08:02), Max: 37.8 (04 Jul 2017 16:50)  T(F): 97.1 (05 Jul 2017 08:02), Max: 100.1 (04 Jul 2017 16:50)  HR: 85 (05 Jul 2017 08:02) (85 - 120)  BP: 134/75 (05 Jul 2017 08:02) (128/65 - 149/89)  BP(mean): --  RR: 20 (05 Jul 2017 08:02) (18 - 20)  SpO2: 92% (05 Jul 2017 05:28) (92% - 94%)    PHYSICAL EXAM:  Constitutional: NAD, well-groomed, well-developed  HEENT: PERRLA, EOMI, Normal Hearing, MMM  Neck: No LAD, No JVD  Back: Normal spine flexure, No CVA tenderness  Respiratory: CTAB Cardiovascular: S1 and S2, RRR, no M/G/R  Gastrointestinal: BS+, soft, NT/ND  Extremities: No peripheral edema  Vascular: 2+ peripheral pulses  Neurological: A/O x 3, no focal deficits  Psychiatric: Normal mood, normal affect  Musculoskeletal: 5/5 strength b/l upper and lower extremities  Skin: No rashes    CAPILLARY BLOOD GLUCOSE  94 (05 Jul 2017 12:48)  116 (05 Jul 2017 08:56)  109 (05 Jul 2017 05:28)  105 (05 Jul 2017 00:05)  111 (04 Jul 2017 18:10)  131 (04 Jul 2017 11:48)  157 (04 Jul 2017 08:31)  128 (04 Jul 2017 00:55)  136 (03 Jul 2017 17:48)  176 (03 Jul 2017 11:45)    LABS:                        13.5   11.7  )-----------( 227      ( 05 Jul 2017 07:08 )             39.5     07-05    141  |  103  |  8.0  ----------------------------<  140<H>  3.6   |  21.0<L>  |  0.88    Ca    8.6      05 Jul 2017 07:08  Phos  2.4     07-05  Mg     2.1     07-05    TPro  7.7  /  Alb  3.7  /  TBili  0.8  /  DBili  x   /  AST  58<H>  /  ALT  41<H>  /  AlkPhos  110  07-04          RADIOLOGY & ADDITIONAL TESTS:
SANDRA MCQUEEN     Chief Complaint: Patient is a 67y old  Male who presents with a chief complaint of Abdominal pain (03 Jul 2017 10:49)      PAST MEDICAL & SURGICAL HISTORY:  Alcohol-induced acute pancreatitis, unspecified complication status  Pancreatitis  Lumbar disc herniation: L4-L5  Diabetes mellitus  Alcohol abuse, daily use: Daily Use since 2010  Hypertension  Emphysema  No significant past surgical history      HPI/OVERNIGHT EVENTS:    MEDICATIONS  (STANDING):  enoxaparin Injectable 40 milliGRAM(s) SubCutaneous every 24 hours  folic acid 1 milliGRAM(s) Oral daily  multivitamin 1 Tablet(s) Oral daily  thiamine 100 milliGRAM(s) Oral daily  insulin lispro (HumaLOG) corrective regimen sliding scale   SubCutaneous three times a day before meals  dextrose 5%. 1000 milliLiter(s) (50 mL/Hr) IV Continuous <Continuous>  dextrose 50% Injectable 12.5 Gram(s) IV Push once  amLODIPine   Tablet 10 milliGRAM(s) Oral daily  ALBUTerol/ipratropium for Nebulization 3 milliLiter(s) Nebulizer every 6 hours  pantoprazole    Tablet 40 milliGRAM(s) Oral before breakfast      Vital Signs Last 24 Hrs  T(C): 37.4 (06 Jul 2017 04:04), Max: 37.5 (05 Jul 2017 16:56)  T(F): 99.3 (06 Jul 2017 04:04), Max: 99.5 (05 Jul 2017 16:56)  HR: 97 (06 Jul 2017 04:04) (95 - 99)  BP: 138/86 (06 Jul 2017 04:04) (138/86 - 161/87)  BP(mean): --  RR: 18 (06 Jul 2017 04:04) (18 - 18)  SpO2: 96% (06 Jul 2017 04:04) (94% - 96%)    PHYSICAL EXAM:  Constitutional: NAD, well-groomed, well-developed  HEENT: PERRLA, EOMI, Normal Hearing, MMM  Neck: No LAD, No JVD  Back: Normal spine flexure, No CVA tenderness  Respiratory: CTAB Cardiovascular: S1 and S2, RRR, no M/G/R  Gastrointestinal: BS+, soft, NT/ND  Extremities: No peripheral edema  Vascular: 2+ peripheral pulses  Neurological: A/O x 3, no focal deficits  Psychiatric: Normal mood, normal affect  Musculoskeletal: 5/5 strength b/l upper and lower extremities  Skin: No rashes    CAPILLARY BLOOD GLUCOSE  150 (06 Jul 2017 08:17)  128 (05 Jul 2017 22:53)  145 (05 Jul 2017 17:26)  94 (05 Jul 2017 12:48)  116 (05 Jul 2017 08:56)  109 (05 Jul 2017 05:28)  105 (05 Jul 2017 00:05)  111 (04 Jul 2017 18:10)  131 (04 Jul 2017 11:48)  157 (04 Jul 2017 08:31)  128 (04 Jul 2017 00:55)  136 (03 Jul 2017 17:48)  176 (03 Jul 2017 11:45)    LABS:                        13.5   11.7  )-----------( 227      ( 05 Jul 2017 07:08 )             39.5     07-06    138  |  101  |  8.0  ----------------------------<  135<H>  3.7   |  22.0  |  0.81    Ca    8.7      06 Jul 2017 07:43  Phos  2.4     07-05  Mg     2.1     07-05            RADIOLOGY & ADDITIONAL TESTS:

## 2017-07-06 NOTE — DISCHARGE NOTE ADULT - HOSPITAL COURSE
· Assessment		  67 year old male pancreatitis, etoh abuse, copd    Problem/Plan - 1:  ·  Problem: Alcohol-induced acute pancreatitis without infection or necrosis.  Plan: NPO, IV fluids, pain management  7/5/2017 Patient improved, dc ivf, advance diet.  7/6/2017 Patient stable dc home  Follow up with Dr Ventura.     Problem/Plan - 2:  ·  Problem: Type 2 diabetes mellitus with other neurologic complication.  Plan: Monitor BGM.     Problem/Plan - 3:  ·  Problem: Alcohol abuse, daily use.  Plan: encourage outpatient support.     Problem/Plan - 4:  ·  Problem: Essential hypertension.  Plan: Monitor BP.     Problem/Plan - 5:  ·  Problem: Emphysema.  Plan: Continue inhalers.     Patient is at high risk for readmission if he continues to abuse alcohol.

## 2017-07-06 NOTE — DISCHARGE NOTE ADULT - SECONDARY DIAGNOSIS.
Alcohol abuse, daily use Type 2 diabetes mellitus with other neurologic complication Emphysema Essential hypertension

## 2017-07-06 NOTE — DISCHARGE NOTE ADULT - MEDICATION SUMMARY - MEDICATIONS TO TAKE
I will START or STAY ON the medications listed below when I get home from the hospital:    Glucophage 500 mg oral tablet  -- 1 tab(s) by mouth once a day with breakfast  -- Check with your doctor before becoming pregnant.  Do not drink alcoholic beverages when taking this medication.  It is very important that you take or use this exactly as directed.  Do not skip doses or discontinue unless directed by your doctor.  Obtain medical advice before taking any non-prescription drugs as some may affect the action of this medication.  Take with food or milk.    -- Indication: For Type 2 diabetes mellitus with other neurologic complication    metoprolol tartrate 25 mg oral tablet  -- 1 tab(s) by mouth once a day  -- Indication: For Essential hypertension    budesonide-formoterol 160 mcg-4.5 mcg/inh inhalation aerosol  -- 2 puff(s) inhaled 2 times a day  -- Indication: For Asthma    albuterol CFC free 90 mcg/inh inhalation aerosol  -- 2 puff(s) inhaled every 6 hours, As needed, Shortness of Breath and/or Wheezing  -- Indication: For Asthma    amLODIPine 10 mg oral tablet  -- 1 tab(s) by mouth once a day  -- Indication: For HTN    pantoprazole 40 mg oral delayed release tablet  -- 1 tab(s) by mouth once a day (before a meal)  -- Indication: For GERD    Multiple Vitamins oral tablet  -- 1 tab(s) by mouth once a day  -- Indication: For Supplement    folic acid 1 mg oral tablet  -- 1 tab(s) by mouth once a day  -- Indication: For Supplement    thiamine 100 mg oral tablet  -- 1 tab(s) by mouth once a day  -- Indication: For Supplement

## 2017-07-06 NOTE — DISCHARGE NOTE ADULT - CARE PLAN
Principal Discharge DX:	Alcohol-induced acute pancreatitis without infection or necrosis  Goal:	Avoid Pancreatitis  Instructions for follow-up, activity and diet:	I spoke to the patient at length regarding alcohol abuse. I suggested alcohol anonymous.  Secondary Diagnosis:	Alcohol abuse, daily use  Instructions for follow-up, activity and diet:	I recommended an outpatient support group.  Secondary Diagnosis:	Type 2 diabetes mellitus with other neurologic complication  Goal:	Tolerable glycemia  Instructions for follow-up, activity and diet:	Follow up with Dr Ventura  Secondary Diagnosis:	Emphysema  Goal:	Stable respiration  Instructions for follow-up, activity and diet:	Follow up with Dr Hills  Secondary Diagnosis:	Essential hypertension  Instructions for follow-up, activity and diet:	Stable blood pressure

## 2017-07-31 ENCOUNTER — LABORATORY RESULT (OUTPATIENT)
Age: 67
End: 2017-07-31

## 2017-07-31 ENCOUNTER — APPOINTMENT (OUTPATIENT)
Dept: INTERNAL MEDICINE | Facility: CLINIC | Age: 67
End: 2017-07-31
Payer: MEDICARE

## 2017-07-31 PROCEDURE — 36415 COLL VENOUS BLD VENIPUNCTURE: CPT

## 2017-07-31 PROCEDURE — 99214 OFFICE O/P EST MOD 30 MIN: CPT | Mod: 25

## 2017-07-31 PROCEDURE — 83036 HEMOGLOBIN GLYCOSYLATED A1C: CPT | Mod: QW

## 2017-08-01 ENCOUNTER — RESULT REVIEW (OUTPATIENT)
Age: 67
End: 2017-08-01

## 2017-08-01 LAB
ALBUMIN SERPL ELPH-MCNC: 4.7 G/DL
ALP BLD-CCNC: 131 U/L
ALT SERPL-CCNC: 54 U/L
AMYLASE/CREAT SERPL: 120 U/L
ANION GAP SERPL CALC-SCNC: 17 MMOL/L
AST SERPL-CCNC: 72 U/L
BASOPHILS # BLD AUTO: 0.07 K/UL
BASOPHILS NFR BLD AUTO: 0.7 %
BILIRUB DIRECT SERPL-MCNC: 0.1 MG/DL
BILIRUB INDIRECT SERPL-MCNC: 0.2 MG/DL
BILIRUB SERPL-MCNC: 0.3 MG/DL
BUN SERPL-MCNC: 17 MG/DL
CALCIUM SERPL-MCNC: 10.5 MG/DL
CHLORIDE SERPL-SCNC: 101 MMOL/L
CO2 SERPL-SCNC: 20 MMOL/L
CREAT SERPL-MCNC: 1.08 MG/DL
EOSINOPHIL # BLD AUTO: 0.45 K/UL
EOSINOPHIL NFR BLD AUTO: 4.8 %
GLUCOSE SERPL-MCNC: 117 MG/DL
HCT VFR BLD CALC: 44.7 %
HGB BLD-MCNC: 14.7 G/DL
IMM GRANULOCYTES NFR BLD AUTO: 0.2 %
LPL SERPL-CCNC: 128 U/L
LYMPHOCYTES # BLD AUTO: 3.46 K/UL
LYMPHOCYTES NFR BLD AUTO: 36.7 %
MAN DIFF?: NORMAL
MCHC RBC-ENTMCNC: 32.2 PG
MCHC RBC-ENTMCNC: 32.9 GM/DL
MCV RBC AUTO: 98 FL
MONOCYTES # BLD AUTO: 0.7 K/UL
MONOCYTES NFR BLD AUTO: 7.4 %
NEUTROPHILS # BLD AUTO: 4.74 K/UL
NEUTROPHILS NFR BLD AUTO: 50.2 %
PLATELET # BLD AUTO: 283 K/UL
POTASSIUM SERPL-SCNC: 4.5 MMOL/L
PROT SERPL-MCNC: 8.7 G/DL
RBC # BLD: 4.56 M/UL
RBC # FLD: 13.8 %
SODIUM SERPL-SCNC: 138 MMOL/L
WBC # FLD AUTO: 9.44 K/UL

## 2017-09-04 ENCOUNTER — RX RENEWAL (OUTPATIENT)
Age: 67
End: 2017-09-04

## 2017-09-13 ENCOUNTER — RX RENEWAL (OUTPATIENT)
Age: 67
End: 2017-09-13

## 2017-10-19 ENCOUNTER — APPOINTMENT (OUTPATIENT)
Dept: INTERNAL MEDICINE | Facility: CLINIC | Age: 67
End: 2017-10-19

## 2017-10-23 ENCOUNTER — RX RENEWAL (OUTPATIENT)
Age: 67
End: 2017-10-23

## 2017-12-12 ENCOUNTER — APPOINTMENT (OUTPATIENT)
Dept: INTERNAL MEDICINE | Facility: CLINIC | Age: 67
End: 2017-12-12
Payer: MEDICARE

## 2017-12-12 VITALS
DIASTOLIC BLOOD PRESSURE: 90 MMHG | OXYGEN SATURATION: 96 % | HEART RATE: 103 BPM | BODY MASS INDEX: 29.16 KG/M2 | HEIGHT: 67.5 IN | SYSTOLIC BLOOD PRESSURE: 150 MMHG | WEIGHT: 188 LBS

## 2017-12-12 PROCEDURE — 36415 COLL VENOUS BLD VENIPUNCTURE: CPT

## 2017-12-12 PROCEDURE — 99214 OFFICE O/P EST MOD 30 MIN: CPT | Mod: 25

## 2017-12-13 LAB
ALBUMIN SERPL ELPH-MCNC: 4.7 G/DL
ALP BLD-CCNC: 123 U/L
ALT SERPL-CCNC: 49 U/L
ANION GAP SERPL CALC-SCNC: 19 MMOL/L
AST SERPL-CCNC: 60 U/L
BASOPHILS # BLD AUTO: 0.04 K/UL
BASOPHILS NFR BLD AUTO: 0.5 %
BILIRUB SERPL-MCNC: 0.4 MG/DL
BUN SERPL-MCNC: 14 MG/DL
CALCIUM SERPL-MCNC: 10.5 MG/DL
CHLORIDE SERPL-SCNC: 96 MMOL/L
CHOLEST SERPL-MCNC: 304 MG/DL
CHOLEST/HDLC SERPL: 5.4 RATIO
CO2 SERPL-SCNC: 22 MMOL/L
CREAT SERPL-MCNC: 1.3 MG/DL
EOSINOPHIL # BLD AUTO: 0.41 K/UL
EOSINOPHIL NFR BLD AUTO: 4.8
GLUCOSE SERPL-MCNC: 140 MG/DL
HBA1C MFR BLD HPLC: 7.2 %
HCT VFR BLD CALC: 46.4 %
HDLC SERPL-MCNC: 56 MG/DL
HGB BLD-MCNC: 15.2 G/DL
IMM GRANULOCYTES NFR BLD AUTO: 0.4 %
LDLC SERPL CALC-MCNC: 215 MG/DL
LYMPHOCYTES # BLD AUTO: 2.83 K/UL
LYMPHOCYTES NFR BLD AUTO: 33.1 %
MAN DIFF?: NORMAL
MCHC RBC-ENTMCNC: 32.1 PG
MCHC RBC-ENTMCNC: 32.8 GM/DL
MCV RBC AUTO: 97.9 FL
MONOCYTES # BLD AUTO: 0.75 K/UL
MONOCYTES NFR BLD AUTO: 8.8 %
NEUTROPHILS # BLD AUTO: 4.5 K/UL
NEUTROPHILS NFR BLD AUTO: 52.4 %
PLATELET # BLD AUTO: 269 K/UL
POTASSIUM SERPL-SCNC: 4.7 MMOL/L
PROT SERPL-MCNC: 9 G/DL
RBC # BLD: 4.74 M/UL
RBC # FLD: 13.4 %
SODIUM SERPL-SCNC: 137 MMOL/L
TRIGL SERPL-MCNC: 163 MG/DL
WBC # FLD AUTO: 8.56 K/UL

## 2017-12-15 ENCOUNTER — CHART COPY (OUTPATIENT)
Age: 67
End: 2017-12-15

## 2018-01-02 RX ORDER — ATORVASTATIN CALCIUM 40 MG/1
40 TABLET, FILM COATED ORAL
Qty: 30 | Refills: 5 | Status: DISCONTINUED | COMMUNITY
Start: 2017-12-15 | End: 2018-01-02

## 2018-01-15 ENCOUNTER — APPOINTMENT (OUTPATIENT)
Dept: INTERNAL MEDICINE | Facility: CLINIC | Age: 68
End: 2018-01-15
Payer: MEDICARE

## 2018-01-15 VITALS
WEIGHT: 188 LBS | HEIGHT: 67.5 IN | DIASTOLIC BLOOD PRESSURE: 88 MMHG | BODY MASS INDEX: 29.16 KG/M2 | HEART RATE: 96 BPM | SYSTOLIC BLOOD PRESSURE: 150 MMHG | OXYGEN SATURATION: 97 %

## 2018-01-15 PROCEDURE — 36415 COLL VENOUS BLD VENIPUNCTURE: CPT

## 2018-01-15 PROCEDURE — 99214 OFFICE O/P EST MOD 30 MIN: CPT | Mod: 25

## 2018-01-16 LAB
ALBUMIN SERPL ELPH-MCNC: 4.6 G/DL
ALP BLD-CCNC: 124 U/L
ALT SERPL-CCNC: 33 U/L
ANION GAP SERPL CALC-SCNC: 16 MMOL/L
AST SERPL-CCNC: 37 U/L
BILIRUB SERPL-MCNC: 0.3 MG/DL
BUN SERPL-MCNC: 12 MG/DL
CALCIUM SERPL-MCNC: 10 MG/DL
CHLORIDE SERPL-SCNC: 99 MMOL/L
CHOLEST SERPL-MCNC: 275 MG/DL
CHOLEST/HDLC SERPL: 5.1 RATIO
CO2 SERPL-SCNC: 24 MMOL/L
CREAT SERPL-MCNC: 1.28 MG/DL
GLUCOSE SERPL-MCNC: 126 MG/DL
HBA1C MFR BLD HPLC: 7.4 %
HDLC SERPL-MCNC: 54 MG/DL
LDLC SERPL CALC-MCNC: 187 MG/DL
POTASSIUM SERPL-SCNC: 4.8 MMOL/L
PROT SERPL-MCNC: 8.4 G/DL
SODIUM SERPL-SCNC: 139 MMOL/L
TRIGL SERPL-MCNC: 169 MG/DL

## 2018-01-22 NOTE — ED ADULT NURSE NOTE - DOES PATIENT HAVE ADVANCE DIRECTIVE
ED / Discharge Outreach Protocol    Patient Contact    Attempt # 1    Was call answered?  No.  Left message on voicemail with information to call me back.    Ashlie Junior RN     No

## 2018-02-01 ENCOUNTER — OUTPATIENT (OUTPATIENT)
Dept: OUTPATIENT SERVICES | Facility: HOSPITAL | Age: 68
LOS: 1 days | End: 2018-02-01
Payer: MEDICAID

## 2018-02-01 PROCEDURE — G9001: CPT

## 2018-02-02 ENCOUNTER — APPOINTMENT (OUTPATIENT)
Dept: INTERNAL MEDICINE | Facility: CLINIC | Age: 68
End: 2018-02-02
Payer: MEDICARE

## 2018-02-02 VITALS
HEIGHT: 67.5 IN | SYSTOLIC BLOOD PRESSURE: 140 MMHG | DIASTOLIC BLOOD PRESSURE: 82 MMHG | OXYGEN SATURATION: 97 % | BODY MASS INDEX: 29.16 KG/M2 | HEART RATE: 92 BPM | WEIGHT: 188 LBS

## 2018-02-02 PROCEDURE — 99214 OFFICE O/P EST MOD 30 MIN: CPT

## 2018-02-07 DIAGNOSIS — R69 ILLNESS, UNSPECIFIED: ICD-10-CM

## 2018-02-14 LAB — HEMOCCULT STL QL IA: NEGATIVE

## 2018-03-01 ENCOUNTER — MEDICATION RENEWAL (OUTPATIENT)
Age: 68
End: 2018-03-01

## 2018-03-05 ENCOUNTER — MEDICATION RENEWAL (OUTPATIENT)
Age: 68
End: 2018-03-05

## 2018-03-14 ENCOUNTER — OTHER (OUTPATIENT)
Age: 68
End: 2018-03-14

## 2018-03-20 ENCOUNTER — MEDICATION RENEWAL (OUTPATIENT)
Age: 68
End: 2018-03-20

## 2018-03-20 ENCOUNTER — RX RENEWAL (OUTPATIENT)
Age: 68
End: 2018-03-20

## 2018-03-22 ENCOUNTER — APPOINTMENT (OUTPATIENT)
Dept: ORTHOPEDIC SURGERY | Facility: CLINIC | Age: 68
End: 2018-03-22

## 2018-05-02 ENCOUNTER — MEDICATION RENEWAL (OUTPATIENT)
Age: 68
End: 2018-05-02

## 2018-05-03 ENCOUNTER — RX RENEWAL (OUTPATIENT)
Age: 68
End: 2018-05-03

## 2018-05-03 ENCOUNTER — MEDICATION RENEWAL (OUTPATIENT)
Age: 68
End: 2018-05-03

## 2018-05-09 ENCOUNTER — APPOINTMENT (OUTPATIENT)
Dept: INTERNAL MEDICINE | Facility: CLINIC | Age: 68
End: 2018-05-09

## 2018-05-21 ENCOUNTER — RX RENEWAL (OUTPATIENT)
Age: 68
End: 2018-05-21

## 2018-06-04 ENCOUNTER — RX RENEWAL (OUTPATIENT)
Age: 68
End: 2018-06-04

## 2018-06-21 ENCOUNTER — APPOINTMENT (OUTPATIENT)
Dept: INTERNAL MEDICINE | Facility: CLINIC | Age: 68
End: 2018-06-21
Payer: MEDICARE

## 2018-06-21 VITALS
BODY MASS INDEX: 29.78 KG/M2 | WEIGHT: 192 LBS | HEIGHT: 67.5 IN | DIASTOLIC BLOOD PRESSURE: 90 MMHG | SYSTOLIC BLOOD PRESSURE: 160 MMHG | HEART RATE: 85 BPM | OXYGEN SATURATION: 95 %

## 2018-06-21 PROCEDURE — 99214 OFFICE O/P EST MOD 30 MIN: CPT | Mod: 25

## 2018-06-21 PROCEDURE — 36415 COLL VENOUS BLD VENIPUNCTURE: CPT

## 2018-06-21 RX ORDER — METHYLPREDNISOLONE 4 MG/1
4 TABLET ORAL
Qty: 1 | Refills: 0 | Status: DISCONTINUED | COMMUNITY
Start: 2018-02-02 | End: 2018-06-21

## 2018-06-21 NOTE — PHYSICAL EXAM
[No Acute Distress] : no acute distress [No Respiratory Distress] : no respiratory distress  [Clear to Auscultation] : lungs were clear to auscultation bilaterally [No Accessory Muscle Use] : no accessory muscle use [Normal Rate] : normal rate  [Regular Rhythm] : with a regular rhythm [Normal S1, S2] : normal S1 and S2 [Normal Gait] : normal gait [Normal Affect] : the affect was normal [Alert and Oriented x3] : oriented to person, place, and time [] : both feet [de-identified] : poor insight

## 2018-06-21 NOTE — HISTORY OF PRESENT ILLNESS
[de-identified] : Patient presents for follow up of diabetes, hypertension, hyperlipidemia, COPD. He is on metformin for diabetes FS <170. He is on amlodipine, metoprolol, losartan for hypertension. On crestor for hyperlipidemia. Was going to pulmonary rehab for COPD but had to stop due to the fact that his transportation was always late. He has not followed up with pulmonary. He has no acute complaints today.

## 2018-06-21 NOTE — ASSESSMENT
[FreeTextEntry1] : Check labs, further recommendations based on results. \par BP high. Increase losartan to 100mg daily. \par Encouraged follow up with pulmonary. \par FIT negative 12/17. \par Follow up for BP check in 4 weeks.\par \par

## 2018-06-22 LAB
ALBUMIN SERPL ELPH-MCNC: 4.7 G/DL
ALP BLD-CCNC: 113 U/L
ALT SERPL-CCNC: 40 U/L
ANION GAP SERPL CALC-SCNC: 17 MMOL/L
AST SERPL-CCNC: 49 U/L
BILIRUB SERPL-MCNC: 0.3 MG/DL
BUN SERPL-MCNC: 20 MG/DL
CALCIUM SERPL-MCNC: 10.2 MG/DL
CHLORIDE SERPL-SCNC: 99 MMOL/L
CHOLEST SERPL-MCNC: 301 MG/DL
CHOLEST/HDLC SERPL: 5 RATIO
CO2 SERPL-SCNC: 23 MMOL/L
CREAT SERPL-MCNC: 1.31 MG/DL
GLUCOSE SERPL-MCNC: 130 MG/DL
HBA1C MFR BLD HPLC: 7.6 %
HCV AB SER QL: NONREACTIVE
HCV S/CO RATIO: 0.27 S/CO
HDLC SERPL-MCNC: 60 MG/DL
LDLC SERPL CALC-MCNC: 195 MG/DL
POTASSIUM SERPL-SCNC: 4.4 MMOL/L
PROT SERPL-MCNC: 8.6 G/DL
SODIUM SERPL-SCNC: 139 MMOL/L
TRIGL SERPL-MCNC: 230 MG/DL

## 2018-06-26 ENCOUNTER — MEDICATION RENEWAL (OUTPATIENT)
Age: 68
End: 2018-06-26

## 2018-07-27 ENCOUNTER — RX RENEWAL (OUTPATIENT)
Age: 68
End: 2018-07-27

## 2018-07-30 ENCOUNTER — RX RENEWAL (OUTPATIENT)
Age: 68
End: 2018-07-30

## 2018-09-10 ENCOUNTER — MEDICATION RENEWAL (OUTPATIENT)
Age: 68
End: 2018-09-10

## 2018-09-14 ENCOUNTER — APPOINTMENT (OUTPATIENT)
Dept: INTERNAL MEDICINE | Facility: CLINIC | Age: 68
End: 2018-09-14
Payer: MEDICARE

## 2018-09-14 VITALS
BODY MASS INDEX: 29.47 KG/M2 | HEIGHT: 67.5 IN | DIASTOLIC BLOOD PRESSURE: 82 MMHG | SYSTOLIC BLOOD PRESSURE: 132 MMHG | HEART RATE: 96 BPM | WEIGHT: 190 LBS | OXYGEN SATURATION: 97 %

## 2018-09-14 DIAGNOSIS — K86.1 OTHER CHRONIC PANCREATITIS: ICD-10-CM

## 2018-09-14 PROCEDURE — 36415 COLL VENOUS BLD VENIPUNCTURE: CPT

## 2018-09-14 PROCEDURE — 99214 OFFICE O/P EST MOD 30 MIN: CPT | Mod: 25

## 2018-09-17 LAB
ALBUMIN SERPL ELPH-MCNC: 4.9 G/DL
ALP BLD-CCNC: 134 U/L
ALT SERPL-CCNC: 31 U/L
ANION GAP SERPL CALC-SCNC: 16 MMOL/L
AST SERPL-CCNC: 42 U/L
BILIRUB SERPL-MCNC: 0.3 MG/DL
BUN SERPL-MCNC: 16 MG/DL
CALCIUM SERPL-MCNC: 10.4 MG/DL
CHLORIDE SERPL-SCNC: 99 MMOL/L
CHOLEST SERPL-MCNC: 191 MG/DL
CHOLEST/HDLC SERPL: 3.1 RATIO
CO2 SERPL-SCNC: 25 MMOL/L
CREAT SERPL-MCNC: 1.18 MG/DL
GLUCOSE SERPL-MCNC: 144 MG/DL
HBA1C MFR BLD HPLC: 7.4 %
HDLC SERPL-MCNC: 61 MG/DL
LDLC SERPL CALC-MCNC: 101 MG/DL
POTASSIUM SERPL-SCNC: 4.4 MMOL/L
PROT SERPL-MCNC: 8.5 G/DL
PSA SERPL-MCNC: 1.53 NG/ML
SODIUM SERPL-SCNC: 140 MMOL/L
TRIGL SERPL-MCNC: 143 MG/DL

## 2018-09-17 NOTE — HISTORY OF PRESENT ILLNESS
[de-identified] : Patient presents for follow up of diabetes, hypertension, hyperlipidemia, COPD. He is on metformin for diabetes, not checking FS regularly. He is on amlodipine, metoprolol, losartan for hypertension. On crestor for hyperlipidemia. Was going to pulmonary rehab for COPD but had to stop due to the fact that his transportation was always late. He has not followed up with pulmonary. He complains of urinary frequency worse at night.

## 2018-09-17 NOTE — PHYSICAL EXAM
[No Acute Distress] : no acute distress [No Respiratory Distress] : no respiratory distress  [Clear to Auscultation] : lungs were clear to auscultation bilaterally [No Accessory Muscle Use] : no accessory muscle use [Normal Rate] : normal rate  [Regular Rhythm] : with a regular rhythm [Normal S1, S2] : normal S1 and S2 [Normal Gait] : normal gait [Normal Affect] : the affect was normal [Alert and Oriented x3] : oriented to person, place, and time [] : both feet [de-identified] : poor insight

## 2018-09-17 NOTE — REVIEW OF SYSTEMS
[Dysuria] : no dysuria [Nocturia] : nocturia [Negative] : Heme/Lymph [FreeTextEntry9] : right shoulder pain

## 2018-09-25 ENCOUNTER — MEDICATION RENEWAL (OUTPATIENT)
Age: 68
End: 2018-09-25

## 2018-10-09 ENCOUNTER — OUTPATIENT (OUTPATIENT)
Dept: OUTPATIENT SERVICES | Facility: HOSPITAL | Age: 68
LOS: 1 days | End: 2018-10-09
Payer: MEDICARE

## 2018-10-09 ENCOUNTER — APPOINTMENT (OUTPATIENT)
Dept: ULTRASOUND IMAGING | Facility: CLINIC | Age: 68
End: 2018-10-09
Payer: MEDICARE

## 2018-10-09 DIAGNOSIS — R74.0 NONSPECIFIC ELEVATION OF LEVELS OF TRANSAMINASE AND LACTIC ACID DEHYDROGENASE [LDH]: ICD-10-CM

## 2018-10-09 PROCEDURE — 76700 US EXAM ABDOM COMPLETE: CPT | Mod: 26

## 2018-10-09 PROCEDURE — 76700 US EXAM ABDOM COMPLETE: CPT

## 2018-10-25 ENCOUNTER — MEDICATION RENEWAL (OUTPATIENT)
Age: 68
End: 2018-10-25

## 2018-11-05 ENCOUNTER — RX RENEWAL (OUTPATIENT)
Age: 68
End: 2018-11-05

## 2018-12-04 ENCOUNTER — RX RENEWAL (OUTPATIENT)
Age: 68
End: 2018-12-04

## 2018-12-04 ENCOUNTER — MEDICATION RENEWAL (OUTPATIENT)
Age: 68
End: 2018-12-04

## 2018-12-11 ENCOUNTER — APPOINTMENT (OUTPATIENT)
Dept: HEPATOLOGY | Facility: CLINIC | Age: 68
End: 2018-12-11

## 2018-12-13 ENCOUNTER — APPOINTMENT (OUTPATIENT)
Dept: INTERNAL MEDICINE | Facility: CLINIC | Age: 68
End: 2018-12-13

## 2018-12-28 ENCOUNTER — APPOINTMENT (OUTPATIENT)
Dept: INTERNAL MEDICINE | Facility: CLINIC | Age: 68
End: 2018-12-28
Payer: MEDICARE

## 2018-12-28 ENCOUNTER — APPOINTMENT (OUTPATIENT)
Dept: INTERNAL MEDICINE | Facility: CLINIC | Age: 68
End: 2018-12-28

## 2018-12-28 VITALS
OXYGEN SATURATION: 94 % | HEART RATE: 93 BPM | HEIGHT: 67.5 IN | DIASTOLIC BLOOD PRESSURE: 84 MMHG | BODY MASS INDEX: 29.94 KG/M2 | WEIGHT: 193 LBS | SYSTOLIC BLOOD PRESSURE: 158 MMHG

## 2018-12-28 PROCEDURE — 36415 COLL VENOUS BLD VENIPUNCTURE: CPT

## 2018-12-28 PROCEDURE — 99214 OFFICE O/P EST MOD 30 MIN: CPT | Mod: 25

## 2018-12-30 LAB
ALBUMIN SERPL ELPH-MCNC: 4.9 G/DL
ALP BLD-CCNC: 110 U/L
ALT SERPL-CCNC: 49 U/L
ANION GAP SERPL CALC-SCNC: 15 MMOL/L
AST SERPL-CCNC: 52 U/L
BILIRUB SERPL-MCNC: 0.3 MG/DL
BUN SERPL-MCNC: 16 MG/DL
CALCIUM SERPL-MCNC: 10.4 MG/DL
CHLORIDE SERPL-SCNC: 98 MMOL/L
CHOLEST SERPL-MCNC: 307 MG/DL
CHOLEST/HDLC SERPL: 5.4 RATIO
CO2 SERPL-SCNC: 25 MMOL/L
CREAT SERPL-MCNC: 1.05 MG/DL
GLUCOSE SERPL-MCNC: 134 MG/DL
HBA1C MFR BLD HPLC: 7.3 %
HDLC SERPL-MCNC: 57 MG/DL
LDLC SERPL CALC-MCNC: 207 MG/DL
POTASSIUM SERPL-SCNC: 4.8 MMOL/L
PROT SERPL-MCNC: 8.6 G/DL
SODIUM SERPL-SCNC: 138 MMOL/L
TRIGL SERPL-MCNC: 216 MG/DL

## 2019-01-02 NOTE — ASSESSMENT
[FreeTextEntry1] : Check labs, further recommendations based on results. \par BP high - under a lot of stress today. Continue current medications, monitor BP at home, follow up in 6 weeks.\par Encouraged follow up with pulmonary. \par Viagra for ED.\par FIT negative 12/17. \par Declines Prevnar, flu vaccines. \par \par \par

## 2019-01-02 NOTE — HISTORY OF PRESENT ILLNESS
[de-identified] : Patient presents for follow up of diabetes, hypertension, hyperlipidemia, COPD, BPH. He is on metformin for diabetes, not checking FS regularly. He is on amlodipine, metoprolol, losartan for hypertension. On crestor for hyperlipidemia. Was going to pulmonary rehab for COPD but had to stop due to the fact that his transportation was always late. He has not followed up with pulmonary. He complains of ED.

## 2019-01-04 ENCOUNTER — MEDICATION RENEWAL (OUTPATIENT)
Age: 69
End: 2019-01-04

## 2019-02-07 ENCOUNTER — MEDICATION RENEWAL (OUTPATIENT)
Age: 69
End: 2019-02-07

## 2019-02-18 ENCOUNTER — RX RENEWAL (OUTPATIENT)
Age: 69
End: 2019-02-18

## 2019-02-21 ENCOUNTER — APPOINTMENT (OUTPATIENT)
Dept: INTERNAL MEDICINE | Facility: CLINIC | Age: 69
End: 2019-02-21
Payer: MEDICARE

## 2019-02-21 VITALS
BODY MASS INDEX: 29.78 KG/M2 | HEIGHT: 67.5 IN | HEART RATE: 97 BPM | OXYGEN SATURATION: 96 % | WEIGHT: 192 LBS | DIASTOLIC BLOOD PRESSURE: 82 MMHG | SYSTOLIC BLOOD PRESSURE: 142 MMHG

## 2019-02-21 PROCEDURE — 36415 COLL VENOUS BLD VENIPUNCTURE: CPT

## 2019-02-21 PROCEDURE — 99214 OFFICE O/P EST MOD 30 MIN: CPT | Mod: 25

## 2019-02-21 NOTE — END OF VISIT
[FreeTextEntry3] : All medical record entries made by the Scribe were at my, Dr. Bradford's, direction and personally dictated by me on [2/21/19]. I have reviewed the chart and agree that the record accurately reflects my personal performance of the history, physical exam, assessment and plan. I have also personally directed, reviewed, and agreed with the chart.\par

## 2019-02-21 NOTE — PHYSICAL EXAM
[No Acute Distress] : no acute distress [Well Nourished] : well nourished [Well Developed] : well developed [Well-Appearing] : well-appearing [No Respiratory Distress] : no respiratory distress  [Clear to Auscultation] : lungs were clear to auscultation bilaterally [No Accessory Muscle Use] : no accessory muscle use [Normal Rate] : normal rate  [Regular Rhythm] : with a regular rhythm [Normal S1, S2] : normal S1 and S2 [No Murmur] : no murmur heard [Normal Affect] : the affect was normal [Normal Insight/Judgement] : insight and judgment were intact [de-identified] : antalgic gait

## 2019-02-21 NOTE — ADDENDUM
[FreeTextEntry1] : I, Nahomy Duncan, acted solely as a scribe for Dr. Bradford on this date [2/21/19]. \par

## 2019-02-21 NOTE — ASSESSMENT
[FreeTextEntry1] : Check labs, further recommendations based on results. \par BP acceptable. Continue current medications, monitor BP at home.\par Encouraged follow up with pulmonary. \par FIT negative 12/17. \par Declines Prevnar, flu vaccines. \par Referral given for spine center. \par Follow up in 3 months. \par \par

## 2019-02-21 NOTE — HISTORY OF PRESENT ILLNESS
[FreeTextEntry1] : Patient presents for follow up.  [de-identified] : Patient presents for follow up of diabetes, hypertension, hyperlipidemia, COPD, BPH. He is on metformin for diabetes, not checking FS regularly. He is on amlodipine, metoprolol, losartan for hypertension. On crestor for hyperlipidemia. Was going to pulmonary rehab for COPD but had to stop due to the fact that his transportation was always late. He has not followed up with pulmonary.  He also complains of back pain and asks for stronger pain medication than naproxen. He is still drinking alcohol.

## 2019-03-01 ENCOUNTER — OUTPATIENT (OUTPATIENT)
Dept: OUTPATIENT SERVICES | Facility: HOSPITAL | Age: 69
LOS: 1 days | End: 2019-03-01
Payer: MEDICARE

## 2019-03-06 ENCOUNTER — RX RENEWAL (OUTPATIENT)
Age: 69
End: 2019-03-06

## 2019-03-06 ENCOUNTER — APPOINTMENT (OUTPATIENT)
Dept: INTERNAL MEDICINE | Facility: CLINIC | Age: 69
End: 2019-03-06

## 2019-03-06 LAB
ALBUMIN SERPL ELPH-MCNC: 4.9 G/DL
ALP BLD-CCNC: 130 U/L
ALT SERPL-CCNC: 37 U/L
ANION GAP SERPL CALC-SCNC: 17 MMOL/L
AST SERPL-CCNC: 46 U/L
BILIRUB SERPL-MCNC: <0.2 MG/DL
BUN SERPL-MCNC: 18 MG/DL
CALCIUM SERPL-MCNC: 10.5 MG/DL
CHLORIDE SERPL-SCNC: 102 MMOL/L
CHOLEST SERPL-MCNC: 188 MG/DL
CHOLEST/HDLC SERPL: 2.9 RATIO
CO2 SERPL-SCNC: 22 MMOL/L
CREAT SERPL-MCNC: 1.27 MG/DL
GLUCOSE SERPL-MCNC: 137 MG/DL
HBA1C MFR BLD HPLC: 7.5 %
HDLC SERPL-MCNC: 64 MG/DL
LDLC SERPL CALC-MCNC: 103 MG/DL
POTASSIUM SERPL-SCNC: 4.8 MMOL/L
PROT SERPL-MCNC: 8.3 G/DL
SODIUM SERPL-SCNC: 141 MMOL/L
TRIGL SERPL-MCNC: 106 MG/DL

## 2019-03-14 ENCOUNTER — INPATIENT (INPATIENT)
Facility: HOSPITAL | Age: 69
LOS: 3 days | Discharge: HOME | DRG: 191 | End: 2019-03-18
Attending: INTERNAL MEDICINE | Admitting: INTERNAL MEDICINE
Payer: MEDICARE

## 2019-03-14 VITALS
TEMPERATURE: 98 F | WEIGHT: 220.02 LBS | HEART RATE: 140 BPM | RESPIRATION RATE: 40 BRPM | SYSTOLIC BLOOD PRESSURE: 154 MMHG | OXYGEN SATURATION: 92 % | DIASTOLIC BLOOD PRESSURE: 94 MMHG | HEIGHT: 68 IN

## 2019-03-14 DIAGNOSIS — J44.1 CHRONIC OBSTRUCTIVE PULMONARY DISEASE WITH (ACUTE) EXACERBATION: ICD-10-CM

## 2019-03-14 LAB
ALBUMIN SERPL ELPH-MCNC: 4.9 G/DL — SIGNIFICANT CHANGE UP (ref 3.3–5.2)
ALP SERPL-CCNC: 125 U/L — HIGH (ref 40–120)
ALT FLD-CCNC: 36 U/L — SIGNIFICANT CHANGE UP
ANION GAP SERPL CALC-SCNC: 15 MMOL/L — SIGNIFICANT CHANGE UP (ref 5–17)
AST SERPL-CCNC: 36 U/L — SIGNIFICANT CHANGE UP
BILIRUB SERPL-MCNC: 0.5 MG/DL — SIGNIFICANT CHANGE UP (ref 0.4–2)
BUN SERPL-MCNC: 14 MG/DL — SIGNIFICANT CHANGE UP (ref 8–20)
CALCIUM SERPL-MCNC: 9.4 MG/DL — SIGNIFICANT CHANGE UP (ref 8.6–10.2)
CHLORIDE SERPL-SCNC: 98 MMOL/L — SIGNIFICANT CHANGE UP (ref 98–107)
CO2 SERPL-SCNC: 23 MMOL/L — SIGNIFICANT CHANGE UP (ref 22–29)
CREAT SERPL-MCNC: 1.01 MG/DL — SIGNIFICANT CHANGE UP (ref 0.5–1.3)
GLUCOSE BLDC GLUCOMTR-MCNC: 243 MG/DL — HIGH (ref 70–99)
GLUCOSE SERPL-MCNC: 165 MG/DL — HIGH (ref 70–115)
HCT VFR BLD CALC: 47.5 % — SIGNIFICANT CHANGE UP (ref 42–52)
HGB BLD-MCNC: 15.7 G/DL — SIGNIFICANT CHANGE UP (ref 14–18)
LACTATE BLDV-MCNC: 2.2 MMOL/L — HIGH (ref 0.5–2)
MCHC RBC-ENTMCNC: 30.3 PG — SIGNIFICANT CHANGE UP (ref 27–31)
MCHC RBC-ENTMCNC: 33.1 G/DL — SIGNIFICANT CHANGE UP (ref 32–36)
MCV RBC AUTO: 91.5 FL — SIGNIFICANT CHANGE UP (ref 80–94)
PLATELET # BLD AUTO: 402 K/UL — HIGH (ref 150–400)
POTASSIUM SERPL-MCNC: 4.4 MMOL/L — SIGNIFICANT CHANGE UP (ref 3.5–5.3)
POTASSIUM SERPL-SCNC: 4.4 MMOL/L — SIGNIFICANT CHANGE UP (ref 3.5–5.3)
PROT SERPL-MCNC: 9.3 G/DL — HIGH (ref 6.6–8.7)
RAPID RVP RESULT: SIGNIFICANT CHANGE UP
RBC # BLD: 5.19 M/UL — SIGNIFICANT CHANGE UP (ref 4.6–6.2)
RBC # FLD: 12.8 % — SIGNIFICANT CHANGE UP (ref 11–15.6)
SODIUM SERPL-SCNC: 136 MMOL/L — SIGNIFICANT CHANGE UP (ref 135–145)
WBC # BLD: 8.9 K/UL — SIGNIFICANT CHANGE UP (ref 4.8–10.8)
WBC # FLD AUTO: 8.9 K/UL — SIGNIFICANT CHANGE UP (ref 4.8–10.8)

## 2019-03-14 PROCEDURE — 99291 CRITICAL CARE FIRST HOUR: CPT

## 2019-03-14 PROCEDURE — 71045 X-RAY EXAM CHEST 1 VIEW: CPT | Mod: 26

## 2019-03-14 PROCEDURE — 99222 1ST HOSP IP/OBS MODERATE 55: CPT

## 2019-03-14 PROCEDURE — 99223 1ST HOSP IP/OBS HIGH 75: CPT

## 2019-03-14 PROCEDURE — 93010 ELECTROCARDIOGRAM REPORT: CPT

## 2019-03-14 RX ORDER — GLUCAGON INJECTION, SOLUTION 0.5 MG/.1ML
1 INJECTION, SOLUTION SUBCUTANEOUS ONCE
Qty: 0 | Refills: 0 | Status: DISCONTINUED | OUTPATIENT
Start: 2019-03-14 | End: 2019-03-18

## 2019-03-14 RX ORDER — SODIUM CHLORIDE 9 MG/ML
2000 INJECTION INTRAMUSCULAR; INTRAVENOUS; SUBCUTANEOUS ONCE
Qty: 0 | Refills: 0 | Status: COMPLETED | OUTPATIENT
Start: 2019-03-14 | End: 2019-03-14

## 2019-03-14 RX ORDER — DEXTROSE 50 % IN WATER 50 %
25 SYRINGE (ML) INTRAVENOUS ONCE
Qty: 0 | Refills: 0 | Status: DISCONTINUED | OUTPATIENT
Start: 2019-03-14 | End: 2019-03-18

## 2019-03-14 RX ORDER — IPRATROPIUM/ALBUTEROL SULFATE 18-103MCG
3 AEROSOL WITH ADAPTER (GRAM) INHALATION
Qty: 0 | Refills: 0 | Status: DISCONTINUED | OUTPATIENT
Start: 2019-03-14 | End: 2019-03-18

## 2019-03-14 RX ORDER — DEXTROSE 50 % IN WATER 50 %
15 SYRINGE (ML) INTRAVENOUS ONCE
Qty: 0 | Refills: 0 | Status: DISCONTINUED | OUTPATIENT
Start: 2019-03-14 | End: 2019-03-18

## 2019-03-14 RX ORDER — AZITHROMYCIN 500 MG/1
500 TABLET, FILM COATED ORAL ONCE
Qty: 0 | Refills: 0 | Status: COMPLETED | OUTPATIENT
Start: 2019-03-14 | End: 2019-03-14

## 2019-03-14 RX ORDER — PANTOPRAZOLE SODIUM 20 MG/1
40 TABLET, DELAYED RELEASE ORAL
Qty: 0 | Refills: 0 | Status: DISCONTINUED | OUTPATIENT
Start: 2019-03-14 | End: 2019-03-18

## 2019-03-14 RX ORDER — IPRATROPIUM/ALBUTEROL SULFATE 18-103MCG
3 AEROSOL WITH ADAPTER (GRAM) INHALATION EVERY 6 HOURS
Qty: 0 | Refills: 0 | Status: DISCONTINUED | OUTPATIENT
Start: 2019-03-14 | End: 2019-03-18

## 2019-03-14 RX ORDER — FOLIC ACID 0.8 MG
1 TABLET ORAL DAILY
Qty: 0 | Refills: 0 | Status: DISCONTINUED | OUTPATIENT
Start: 2019-03-14 | End: 2019-03-18

## 2019-03-14 RX ORDER — ENOXAPARIN SODIUM 100 MG/ML
40 INJECTION SUBCUTANEOUS EVERY 24 HOURS
Qty: 0 | Refills: 0 | Status: DISCONTINUED | OUTPATIENT
Start: 2019-03-14 | End: 2019-03-18

## 2019-03-14 RX ORDER — DEXTROSE 50 % IN WATER 50 %
12.5 SYRINGE (ML) INTRAVENOUS ONCE
Qty: 0 | Refills: 0 | Status: DISCONTINUED | OUTPATIENT
Start: 2019-03-14 | End: 2019-03-18

## 2019-03-14 RX ORDER — INSULIN LISPRO 100/ML
VIAL (ML) SUBCUTANEOUS
Qty: 0 | Refills: 0 | Status: DISCONTINUED | OUTPATIENT
Start: 2019-03-14 | End: 2019-03-18

## 2019-03-14 RX ORDER — AZITHROMYCIN 500 MG/1
250 TABLET, FILM COATED ORAL DAILY
Qty: 0 | Refills: 0 | Status: COMPLETED | OUTPATIENT
Start: 2019-03-15 | End: 2019-03-18

## 2019-03-14 RX ORDER — SODIUM CHLORIDE 9 MG/ML
1000 INJECTION, SOLUTION INTRAVENOUS
Qty: 0 | Refills: 0 | Status: DISCONTINUED | OUTPATIENT
Start: 2019-03-14 | End: 2019-03-18

## 2019-03-14 RX ORDER — CEFTRIAXONE 500 MG/1
1 INJECTION, POWDER, FOR SOLUTION INTRAMUSCULAR; INTRAVENOUS ONCE
Qty: 0 | Refills: 0 | Status: COMPLETED | OUTPATIENT
Start: 2019-03-14 | End: 2019-03-14

## 2019-03-14 RX ORDER — UMECLIDINIUM 62.5 UG/1
1 AEROSOL, POWDER ORAL
Qty: 0 | Refills: 0 | COMMUNITY

## 2019-03-14 RX ORDER — AMLODIPINE BESYLATE 2.5 MG/1
10 TABLET ORAL DAILY
Qty: 0 | Refills: 0 | Status: DISCONTINUED | OUTPATIENT
Start: 2019-03-14 | End: 2019-03-18

## 2019-03-14 RX ORDER — IPRATROPIUM/ALBUTEROL SULFATE 18-103MCG
3 AEROSOL WITH ADAPTER (GRAM) INHALATION ONCE
Qty: 0 | Refills: 0 | Status: COMPLETED | OUTPATIENT
Start: 2019-03-14 | End: 2019-03-14

## 2019-03-14 RX ORDER — IPRATROPIUM/ALBUTEROL SULFATE 18-103MCG
3 AEROSOL WITH ADAPTER (GRAM) INHALATION EVERY 4 HOURS
Qty: 0 | Refills: 0 | Status: DISCONTINUED | OUTPATIENT
Start: 2019-03-14 | End: 2019-03-14

## 2019-03-14 RX ORDER — ACETAMINOPHEN 500 MG
650 TABLET ORAL EVERY 6 HOURS
Qty: 0 | Refills: 0 | Status: DISCONTINUED | OUTPATIENT
Start: 2019-03-14 | End: 2019-03-18

## 2019-03-14 RX ADMIN — AMLODIPINE BESYLATE 10 MILLIGRAM(S): 2.5 TABLET ORAL at 16:42

## 2019-03-14 RX ADMIN — AZITHROMYCIN 500 MILLIGRAM(S): 500 TABLET, FILM COATED ORAL at 12:00

## 2019-03-14 RX ADMIN — CEFTRIAXONE 1 GRAM(S): 500 INJECTION, POWDER, FOR SOLUTION INTRAMUSCULAR; INTRAVENOUS at 12:00

## 2019-03-14 RX ADMIN — Medication 1 TABLET(S): at 16:42

## 2019-03-14 RX ADMIN — Medication 125 MILLIGRAM(S): at 11:00

## 2019-03-14 RX ADMIN — Medication 1 MILLIGRAM(S): at 16:42

## 2019-03-14 RX ADMIN — Medication 3 MILLILITER(S): at 15:34

## 2019-03-14 RX ADMIN — SODIUM CHLORIDE 2000 MILLILITER(S): 9 INJECTION INTRAMUSCULAR; INTRAVENOUS; SUBCUTANEOUS at 11:09

## 2019-03-14 RX ADMIN — PANTOPRAZOLE SODIUM 40 MILLIGRAM(S): 20 TABLET, DELAYED RELEASE ORAL at 14:22

## 2019-03-14 RX ADMIN — Medication 3 MILLILITER(S): at 20:22

## 2019-03-14 RX ADMIN — SODIUM CHLORIDE 2000 MILLILITER(S): 9 INJECTION INTRAMUSCULAR; INTRAVENOUS; SUBCUTANEOUS at 12:09

## 2019-03-14 RX ADMIN — Medication 3 MILLILITER(S): at 11:01

## 2019-03-14 RX ADMIN — Medication 100 MILLIGRAM(S): at 22:51

## 2019-03-14 RX ADMIN — Medication 40 MILLIGRAM(S): at 16:42

## 2019-03-14 RX ADMIN — CEFTRIAXONE 100 GRAM(S): 500 INJECTION, POWDER, FOR SOLUTION INTRAMUSCULAR; INTRAVENOUS at 11:00

## 2019-03-14 RX ADMIN — Medication 4: at 16:42

## 2019-03-14 RX ADMIN — ENOXAPARIN SODIUM 40 MILLIGRAM(S): 100 INJECTION SUBCUTANEOUS at 16:42

## 2019-03-14 RX ADMIN — AZITHROMYCIN 255 MILLIGRAM(S): 500 TABLET, FILM COATED ORAL at 11:00

## 2019-03-14 RX ADMIN — Medication 40 MILLIGRAM(S): at 22:14

## 2019-03-14 NOTE — CONSULT NOTE ADULT - ASSESSMENT
Patient with viral URI last week with resultant exacerbation of COPD.  Mild resting relative hypoxemia.      Plan:  1.IV steroids  2.Bronchodilators  3.Zithromax PO in view of cough>probable element of bronchitis  4.O2  5.Needs to f/u with me post d/c>needs to be compliant with routine use of LABA/ICS/LAMA (states was making him cough).

## 2019-03-14 NOTE — CONSULT NOTE ADULT - SUBJECTIVE AND OBJECTIVE BOX
PULMONARY CONSULT NOTE      SANDRA MCQUEENLAURA-[id number]    Patient is a 68y old  Male who presents with a chief complaint of shortness of breath.  Patient last week with febrile illness, nasal symptoms and cough which resolved but developed increasing shortness of breath.  He denies chest pain.  I last saw this patient in 2016 and he has known moderate COPD with FEV1/FVC ratios of about 50%.  He had been last on Incruse and Symbicort but appears to only be taking Incruse.  There is no leg edema.  O2 sat reported to be 90% on room air here in ER.  Currently on 2 liters with sat of 95-97%.      INTERVAL HPI/OVERNIGHT EVENTS:    MEDICATIONS  (STANDING):  ALBUTerol/ipratropium for Nebulization 3 milliLiter(s) Nebulizer every 4 hours  dextrose 5%. 1000 milliLiter(s) (50 mL/Hr) IV Continuous <Continuous>  dextrose 50% Injectable 12.5 Gram(s) IV Push once  dextrose 50% Injectable 25 Gram(s) IV Push once  dextrose 50% Injectable 25 Gram(s) IV Push once  enoxaparin Injectable 40 milliGRAM(s) SubCutaneous every 24 hours  insulin lispro (HumaLOG) corrective regimen sliding scale   SubCutaneous three times a day before meals  methylPREDNISolone sodium succinate Injectable 40 milliGRAM(s) IV Push every 6 hours  pantoprazole    Tablet 40 milliGRAM(s) Oral before breakfast      MEDICATIONS  (PRN):  acetaminophen   Tablet .. 650 milliGRAM(s) Oral every 6 hours PRN Temp greater or equal to 38C (100.4F), Mild Pain (1 - 3), Moderate Pain (4 - 6)  ALBUTerol/ipratropium for Nebulization 3 milliLiter(s) Nebulizer every 2 hours PRN Shortness of Breath and/or Wheezing  dextrose 40% Gel 15 Gram(s) Oral once PRN Blood Glucose LESS THAN 70 milliGRAM(s)/deciliter  glucagon  Injectable 1 milliGRAM(s) IntraMuscular once PRN Glucose LESS THAN 70 milligrams/deciliter      Allergies    No Known Allergies    Intolerances        PAST MEDICAL & SURGICAL HISTORY:  Alcohol-induced acute pancreatitis, unspecified complication status  Pancreatitis  Lumbar disc herniation: L4-L5  Diabetes mellitus  Alcohol abuse, daily use: Daily Use since   Hypertension  Emphysema  No significant past surgical history      FAMILY HISTORY:  Family history of cirrhosis of liver (Uncle)  Family history of lung cancer: Father -  at 53 y/o  Family history of diabetes mellitus in mother: mother  Family history of breast cancer in mother: Mother -  at 65 y/o      SOCIAL HISTORY  Smoking History: Former    REVIEW OF SYSTEMS:    CONSTITUTIONAL:  As per HPI.    HEENT:  Eyes:  No diplopia or blurred vision. ENT:  No earache, sore throat or runny nose.    CARDIOVASCULAR:  No pressure, squeezing, tightness, heaviness or aching about the chest; no palpitations.    RESPIRATORY:  Per HPI    GASTROINTESTINAL:  No nausea, vomiting or diarrhea.    GENITOURINARY:  No dysuria, frequency or urgency.    MUSCULOSKELETAL: Back pain    SKIN:  No new lesions.    NEUROLOGIC:  No paresthesias, fasciculations, seizures or weakness.    PSYCHIATRIC:  No disorder of thought or mood.    ENDOCRINE:  No heat or cold intolerance, polyuria or polydipsia.    HEMATOLOGICAL:  No easy bruising or bleeding.     Vital Signs Last 24 Hrs  T(C): 36.8 (14 Mar 2019 10:05), Max: 36.8 (14 Mar 2019 10:05)  T(F): 98.2 (14 Mar 2019 10:05), Max: 98.2 (14 Mar 2019 10:05)  HR: 103 (14 Mar 2019 11:02) (103 - 140)  BP: 164/90 (14 Mar 2019 11:02) (125/94 - 164/90)  BP(mean): 109 (14 Mar 2019 10:15) (109 - 109)  RR: 29 (14 Mar 2019 11:02) (27 - 42)  SpO2: 97% (14 Mar 2019 11:02) (92% - 99%)    PHYSICAL EXAMINATION:    GENERAL: The patient is a well-developed, well-nourished _____in no apparent distress.     HEENT: Head is normocephalic and atraumatic. Extraocular muscles are intact. Mucous membranes are moist.     NECK: Supple.     LUNGS: Diminished throughout with increased E/I    HEART: Regular rate and rhythm without murmur.    ABDOMEN: Soft, nontender, and nondistended.  No hepatosplenomegaly is noted.    EXTREMITIES: Without any cyanosis, clubbing, rash, lesions or edema.    NEUROLOGIC: Grossly intact.    SKIN: No ulceration or induration present.      LABS:                        15.7   8.9   )-----------( 402      ( 14 Mar 2019 11:07 )             47.5     03-14    136  |  98  |  14.0  ----------------------------<  165<H>  4.4   |  23.0  |  1.01    Ca    9.4      14 Mar 2019 11:07    TPro  9.3<H>  /  Alb  4.9  /  TBili  0.5  /  DBili  x   /  AST  36  /  ALT  36  /  AlkPhos  125<H>                          MICROBIOLOGY:    RADIOLOGY & ADDITIONAL STUDIES:< from: Xray Chest 1 View- PORTABLE-Urgent (19 @ 10:43) >   EXAM:  XR CHEST PORTABLE URGENT 1V                          PROCEDURE DATE:  2019          INTERPRETATION:  Portable chest radiograph        CLINICAL INFORMATION:   Short of breath.  Chronic obstructive pulmonary disease.  TECHNIQUE:  Portable  AP view of the chest was obtained.    COMPARISON: 7/3/2017 chest radiograph available for review.    FINDINGS:   The lungs  are clear.  No pleural abnormality is seen.       Lungs are hyperinflated. No interval change.  The heart and mediastinum are within normal limits.         Visualized osseous structures are intact.        IMPRESSION:   No evidence of active chest disease.              < end of copied text >

## 2019-03-14 NOTE — H&P ADULT - ASSESSMENT
69 yo M w/ hx DM2, HTN, COPD, ex smoker presents to ER for progressive shortness of breath and LE.  Started 1 week prior after kalli a "cold".        acute exacerbation of COPD:     c/w steroids, nebs     Z pack     pulmonary following     HTN: norvasc    DM2: RAISS, hga1c    hold metformin.     Reflux: protonix    DVT ppx: lovenox subq

## 2019-03-14 NOTE — H&P ADULT - NSICDXPASTMEDICALHX_GEN_ALL_CORE_FT
PAST MEDICAL HISTORY:  Alcohol abuse, daily use Daily Use since 2010    Alcohol-induced acute pancreatitis, unspecified complication status     Diabetes mellitus     Emphysema     Hypertension     Lumbar disc herniation L4-L5    Pancreatitis

## 2019-03-14 NOTE — ED PROVIDER NOTE - OBJECTIVE STATEMENT
67 y/o male with a h/o emphysema and ex smoker 7 years ago and he was well until about 5 datys 67 y/o male with a h/o emphysema and ex smoker 7 years ago and he was well until about 5 days ago he developed increasing sob and a cough productive of small amounts of clear phlegm no cp pt says he had a nl stress test 2 years ago pt used home neb and still sob so came to ed

## 2019-03-14 NOTE — H&P ADULT - HISTORY OF PRESENT ILLNESS
69 yo M w/ hx DM2, HTN, COPD, ex smoker presents to ER for progressive shortness of breath and LE.  Started 1 week prior after kalli a "cold".  Post acute illness, developed worsening shortness of breath, no cough/chest pain.  Seen by pmd and referred to ER.  no improvement with home nebs or home inhalers.  notes compliance with medications.

## 2019-03-14 NOTE — ED ADULT TRIAGE NOTE - CHIEF COMPLAINT QUOTE
Patient arrived to ED today with c/o shortness of breath for the past two days which has gotten worse.  Patient brought to critical care for MD evaluation.

## 2019-03-14 NOTE — ED PROVIDER NOTE - CLINICAL SUMMARY MEDICAL DECISION MAKING FREE TEXT BOX
pt presented with severe resp distress and gasping resp and after several tx able to speak and h/o mod severe emphysema and will admit and get pulm kevin [place]

## 2019-03-14 NOTE — ED ADULT NURSE NOTE - OBJECTIVE STATEMENT
Pt presents with SOB. State he just got over flu but respiratory symptoms still exist. Lungs diminished b/l. Pt place on 2L nc. In no acute distress.

## 2019-03-14 NOTE — H&P ADULT - NSICDXFAMILYHX_GEN_ALL_CORE_FT
FAMILY HISTORY:  Family history of breast cancer in mother, Mother -  at 65 y/o  Family history of diabetes mellitus in mother, mother  Family history of lung cancer, Father -  at 55 y/o    Uncle  Still living? Unknown  Family history of cirrhosis of liver, Age at diagnosis: Age Unknown

## 2019-03-15 ENCOUNTER — APPOINTMENT (OUTPATIENT)
Dept: INTERNAL MEDICINE | Facility: CLINIC | Age: 69
End: 2019-03-15

## 2019-03-15 LAB
GLUCOSE BLDC GLUCOMTR-MCNC: 194 MG/DL — HIGH (ref 70–99)
GLUCOSE BLDC GLUCOMTR-MCNC: 239 MG/DL — HIGH (ref 70–99)
GLUCOSE BLDC GLUCOMTR-MCNC: 298 MG/DL — HIGH (ref 70–99)
GLUCOSE BLDC GLUCOMTR-MCNC: 336 MG/DL — HIGH (ref 70–99)
HBA1C BLD-MCNC: 7.6 % — HIGH (ref 4–5.6)
HCV AB S/CO SERPL IA: 0.26 S/CO — SIGNIFICANT CHANGE UP (ref 0–0.79)
HCV AB SERPL-IMP: SIGNIFICANT CHANGE UP

## 2019-03-15 PROCEDURE — 99233 SBSQ HOSP IP/OBS HIGH 50: CPT

## 2019-03-15 PROCEDURE — 99233 SBSQ HOSP IP/OBS HIGH 50: CPT | Mod: GC

## 2019-03-15 RX ORDER — BUDESONIDE AND FORMOTEROL FUMARATE DIHYDRATE 160; 4.5 UG/1; UG/1
2 AEROSOL RESPIRATORY (INHALATION)
Qty: 0 | Refills: 0 | Status: DISCONTINUED | OUTPATIENT
Start: 2019-03-15 | End: 2019-03-18

## 2019-03-15 RX ORDER — INSULIN GLARGINE 100 [IU]/ML
10 INJECTION, SOLUTION SUBCUTANEOUS AT BEDTIME
Qty: 0 | Refills: 0 | Status: DISCONTINUED | OUTPATIENT
Start: 2019-03-15 | End: 2019-03-16

## 2019-03-15 RX ADMIN — Medication 6: at 11:02

## 2019-03-15 RX ADMIN — Medication 3 MILLILITER(S): at 15:22

## 2019-03-15 RX ADMIN — Medication 650 MILLIGRAM(S): at 21:13

## 2019-03-15 RX ADMIN — Medication 40 MILLIGRAM(S): at 16:48

## 2019-03-15 RX ADMIN — INSULIN GLARGINE 10 UNIT(S): 100 INJECTION, SOLUTION SUBCUTANEOUS at 21:59

## 2019-03-15 RX ADMIN — Medication 4: at 16:48

## 2019-03-15 RX ADMIN — Medication 650 MILLIGRAM(S): at 02:12

## 2019-03-15 RX ADMIN — Medication 1 TABLET(S): at 11:01

## 2019-03-15 RX ADMIN — Medication 650 MILLIGRAM(S): at 01:55

## 2019-03-15 RX ADMIN — Medication 40 MILLIGRAM(S): at 05:20

## 2019-03-15 RX ADMIN — Medication 3 MILLILITER(S): at 03:22

## 2019-03-15 RX ADMIN — Medication 3 MILLILITER(S): at 08:45

## 2019-03-15 RX ADMIN — Medication 40 MILLIGRAM(S): at 21:13

## 2019-03-15 RX ADMIN — AMLODIPINE BESYLATE 10 MILLIGRAM(S): 2.5 TABLET ORAL at 05:19

## 2019-03-15 RX ADMIN — Medication 2: at 08:07

## 2019-03-15 RX ADMIN — PANTOPRAZOLE SODIUM 40 MILLIGRAM(S): 20 TABLET, DELAYED RELEASE ORAL at 05:19

## 2019-03-15 RX ADMIN — Medication 1 MILLIGRAM(S): at 11:01

## 2019-03-15 RX ADMIN — Medication 3 MILLILITER(S): at 20:39

## 2019-03-15 RX ADMIN — Medication 40 MILLIGRAM(S): at 11:01

## 2019-03-15 RX ADMIN — ENOXAPARIN SODIUM 40 MILLIGRAM(S): 100 INJECTION SUBCUTANEOUS at 16:48

## 2019-03-15 RX ADMIN — AZITHROMYCIN 250 MILLIGRAM(S): 500 TABLET, FILM COATED ORAL at 11:01

## 2019-03-15 NOTE — PROGRESS NOTE ADULT - ATTENDING COMMENTS
69 yo M w/ hx DM2, HTN, COPD, ex smoker presents to ER for progressive shortness of breath and LE.  Started 1 week prior after kalli a "cold". found to have copd exacerbation due to non compliant with us of LABA/ICS/LAMA (states was making him cough)of ICS at home.    PHYSICAL EXAMINATION:    GENERAL: The patient is awake and alert in no apparent distress.   HEENT: Head is normocephalic and atraumatic. Extraocular muscles are intact. Mucous membranes are moist.  NECK: Supple.  LUNGS: diminished bs, mid and end exp wheezes.  HEART: Regular rate and rhythm without murmur.  ABDOMEN: Soft, nontender, and nondistended.    EXTREMITIES: Without any cyanosis, clubbing, rash, lesions or edema.  NEUROLOGIC: Grossly intact.  SKIN: No ulceration or induration present.      Vital Signs Last 24 Hrs  T(C): 36.8 (15 Mar 2019 08:23), Max: 37.2 (14 Mar 2019 16:14)  T(F): 98.2 (15 Mar 2019 08:23), Max: 98.9 (14 Mar 2019 16:14)  HR: 110 (15 Mar 2019 08:45) (88 - 112)  BP: 159/93 (15 Mar 2019 08:23) (131/90 - 167/91)  BP(mean): --  RR: 18 (15 Mar 2019 08:23) (18 - 19)  SpO2: 90% (15 Mar 2019 08:45) (90% - 98%)    plan:  continue IV steroids for now, Symbicort started today. steriods can be tapered from tomorrow  No evidence of PNA on CXR. Azithromycin for bronchitis  hycodan for cough  Lantus 10 u and insulin sliding scale  PPI for stress ulcer prophylaxis  f/u blood cultures 69 yo M w/ hx DM2, HTN, COPD, ex smoker presents to ER for progressive shortness of breath and LE.  Started 1 week prior after kalli a "cold". found to have copd exacerbation due to non compliant with us of LABA/ICS/LAMA (states was making him cough)of ICS at home.    PHYSICAL EXAMINATION:    GENERAL: The patient is awake and alert in no apparent distress.   HEENT: Head is normocephalic and atraumatic. Extraocular muscles are intact. Mucous membranes are moist.  NECK: Supple.  LUNGS: diminished bs, mid and end exp wheezes.  HEART: Regular rate and rhythm without murmur.  ABDOMEN: Soft, nontender, and nondistended.    EXTREMITIES: Without any cyanosis, clubbing, rash, lesions or edema.  NEUROLOGIC: Grossly intact.  SKIN: No ulceration or induration present.      Vital Signs Last 24 Hrs  T(C): 36.8 (15 Mar 2019 08:23), Max: 37.2 (14 Mar 2019 16:14)  T(F): 98.2 (15 Mar 2019 08:23), Max: 98.9 (14 Mar 2019 16:14)  HR: 110 (15 Mar 2019 08:45) (88 - 112)  BP: 159/93 (15 Mar 2019 08:23) (131/90 - 167/91)  BP(mean): --  RR: 18 (15 Mar 2019 08:23) (18 - 19)  SpO2: 90% (15 Mar 2019 08:45) (90% - 98%)    plan:  continue IV steroids for now, Symbicort started today. steriods can be tapered from tomorrow  No evidence of PNA on CXR. Azithromycin for bronchitis  RVP negative  hycodan for cough  Lantus 10 u and insulin sliding scale  PPI for stress ulcer prophylaxis  f/u blood cultures

## 2019-03-15 NOTE — PROGRESS NOTE ADULT - SUBJECTIVE AND OBJECTIVE BOX
PULMONARY PROGRESS NOTE      SANDRA MCQUEENLAURA-[id number]    Patient is a 68y old  Male who presents with a chief complaint of shortness of breath (14 Mar 2019 13:22)      INTERVAL HPI/OVERNIGHT EVENTS:Feels slightly better.  Cough and wheeze persist.    MEDICATIONS  (STANDING):  ALBUTerol/ipratropium for Nebulization 3 milliLiter(s) Nebulizer every 6 hours  amLODIPine   Tablet 10 milliGRAM(s) Oral daily  azithromycin   Tablet 250 milliGRAM(s) Oral daily  dextrose 5%. 1000 milliLiter(s) (50 mL/Hr) IV Continuous <Continuous>  dextrose 50% Injectable 12.5 Gram(s) IV Push once  dextrose 50% Injectable 25 Gram(s) IV Push once  dextrose 50% Injectable 25 Gram(s) IV Push once  enoxaparin Injectable 40 milliGRAM(s) SubCutaneous every 24 hours  folic acid 1 milliGRAM(s) Oral daily  insulin lispro (HumaLOG) corrective regimen sliding scale   SubCutaneous three times a day before meals  methylPREDNISolone sodium succinate Injectable 40 milliGRAM(s) IV Push every 6 hours  multivitamin 1 Tablet(s) Oral daily  pantoprazole    Tablet 40 milliGRAM(s) Oral before breakfast      MEDICATIONS  (PRN):  acetaminophen   Tablet .. 650 milliGRAM(s) Oral every 6 hours PRN Temp greater or equal to 38C (100.4F), Mild Pain (1 - 3), Moderate Pain (4 - 6)  ALBUTerol/ipratropium for Nebulization 3 milliLiter(s) Nebulizer every 2 hours PRN Shortness of Breath and/or Wheezing  dextrose 40% Gel 15 Gram(s) Oral once PRN Blood Glucose LESS THAN 70 milliGRAM(s)/deciliter  glucagon  Injectable 1 milliGRAM(s) IntraMuscular once PRN Glucose LESS THAN 70 milligrams/deciliter  HYDROcodone/homatropine Syrup 5 milliLiter(s) Oral every 4 hours PRN Cough  LORazepam   Injectable 2 milliGRAM(s) IV Push every 2 hours PRN CIWA-Ar score increase by 2 points and a total score of 7 or less      Allergies    No Known Allergies    Intolerances        PAST MEDICAL & SURGICAL HISTORY:  Alcohol-induced acute pancreatitis, unspecified complication status  Pancreatitis  Lumbar disc herniation: L4-L5  Diabetes mellitus  Alcohol abuse, daily use: Daily Use since 2010  Hypertension  Emphysema  No significant past surgical history      SOCIAL HISTORY  Smoking History:       REVIEW OF SYSTEMS:    CONSTITUTIONAL:  No distress    HEENT:  Eyes:  No diplopia or blurred vision. ENT:  No earache, sore throat or runny nose.    CARDIOVASCULAR:  No pressure, squeezing, tightness, heaviness or aching about the chest; no palpitations.    RESPIRATORY: per hpi  GASTROINTESTINAL:  No nausea, vomiting or diarrhea.    GENITOURINARY:  No dysuria, frequency or urgency.    MUSCULOSKELETAL:  No joint pain    SKIN:  No new lesions.    NEUROLOGIC:  No paresthesias, fasciculations, seizures or weakness.    PSYCHIATRIC:  No disorder of thought or mood.    ENDOCRINE:  No heat or cold intolerance, polyuria or polydipsia.    HEMATOLOGICAL:  No easy bruising or bleeding.     Vital Signs Last 24 Hrs  T(C): 36.8 (15 Mar 2019 08:23), Max: 37.2 (14 Mar 2019 16:14)  T(F): 98.2 (15 Mar 2019 08:23), Max: 98.9 (14 Mar 2019 16:14)  HR: 110 (15 Mar 2019 08:45) (88 - 112)  BP: 159/93 (15 Mar 2019 08:23) (131/90 - 167/91)  BP(mean): --  RR: 18 (15 Mar 2019 08:23) (18 - 29)  SpO2: 90% (15 Mar 2019 08:45) (90% - 99%)    PHYSICAL EXAMINATION:    GENERAL: The patient is awake and alert in no apparent distress.     HEENT: Head is normocephalic and atraumatic. Extraocular muscles are intact. Mucous membranes are moist.    NECK: Supple.    LUNGS: diminished bs, mid and end exp wheezes.    HEART: Regular rate and rhythm without murmur.    ABDOMEN: Soft, nontender, and nondistended.      EXTREMITIES: Without any cyanosis, clubbing, rash, lesions or edema.    NEUROLOGIC: Grossly intact.    SKIN: No ulceration or induration present.      LABS:                        15.7   8.9   )-----------( 402      ( 14 Mar 2019 11:07 )             47.5     03-14    136  |  98  |  14.0  ----------------------------<  165<H>  4.4   |  23.0  |  1.01    Ca    9.4      14 Mar 2019 11:07    TPro  9.3<H>  /  Alb  4.9  /  TBili  0.5  /  DBili  x   /  AST  36  /  ALT  36  /  AlkPhos  125<H>  03-14                        MICROBIOLOGY:Rapid Respiratory Viral Panel (03.14.19 @ 11:10)    Rapid RVP Result: NotDetec: This Respiratory Panel uses polymerase chain reaction (PCR) to detect for  adenovirus; coronavirus (HKU1, NL63, 229E, OC43); human metapneumovirus  (hMPV); human enterovirus/rhinovirus (Entero/RV); influenza A; influenza  A/H1; influenza A/H3; influenza A/H1-2009; influenza B; parainfluenza  viruses 1, 2, 3, 4; respiratory syncytial virus; Mycoplasma pneumoniae;  and Chlamydophila pneumoniae.        RADIOLOGY & ADDITIONAL STUDIES:< from: Xray Chest 1 View- PORTABLE-Urgent (03.14.19 @ 10:43) >    IMPRESSION:   No evidence of active chest disease.            < end of copied text >

## 2019-03-15 NOTE — PROGRESS NOTE ADULT - ASSESSMENT
67 yo M w/ hx DM2, HTN, COPD, ex smoker presents to ER for progressive shortness of breath and LE.  Started 1 week prior after kalli a "cold".        Probelm 1)  acute exacerbation of COPD:  -c/w steroids, nebs  -Z pack  -pulmonary consult appreciated, follow recs  -Add Hycodan for cough    Problem 2)  HTN  Continue norvasc    Problem 3)  DM2:   RAISS, hga1c  hold metformin.     Problem 4)  Reflux:   Continue protonix    DVT ppx: lovenox subq 69 yo M w/ hx DM2, HTN, COPD, ex smoker presents to ER for progressive shortness of breath and LE.  Started 1 week prior after kalli a "cold".        Probelm 1)  acute exacerbation of COPD:  -c/w steroids, nebs  -Z pack  -pulmonary consult appreciated, follow recs  -f/u blood cultures  -Add Hycodan for cough    Problem 2)  HTN  Continue Norvasc    Problem 3)  DM2:   RAISS, hga1c  hold metformin.     Problem 4)  Reflux:   Continue protonix    DVT ppx: lovenox subq

## 2019-03-15 NOTE — PROGRESS NOTE ADULT - SUBJECTIVE AND OBJECTIVE BOX
Lady Nam 69 y/o male MR# [id number]    Overnight event:  Seen and examined at bedside. Pt sitting up in bed on breathing treatment. Pt states he feels more improved today than yesterday. He states that he continues to have "coughing fits." No chest pain.     67 yo M w/ hx DM2, HTN, COPD, ex smoker presents to ER for progressive shortness of breath and LE.  Started 1 week prior after kalli a "cold".  Post acute illness, developed worsening shortness of breath, no cough/chest pain.  Seen by pmd and referred to ER.  no improvement with home nebs or home inhalers.  notes compliance with medications.     ROS:  Constitutional: No fevers, no chills, no fatigue  ENT/Mouth: No Hearing Changes, no ear pain, No Nasal Congestion  Cardiovascular: No Chest Pain, no palpitations  Respiratory: +cough, +phlegm, +sob, +LE  Gastrointestinal: No Nausea, No Vomiting, No Diarrhea, No Constipation, No Pain,   Genitourinary: no dysuria, no urinary frequency  Musculoskeletal: No pain, no weakness  Skin: No rash, No lesion  Neuro: No Weakness, No Numbness, No Paresthesias, No Loss of Consciousness  Psych: No Anxiety/Panic, No Depression, No Insomnia,   Heme/Lymph: No Bruising, No Bleeding    Vital Signs Last 24 Hrs  T(C): 36.8 (15 Mar 2019 08:23), Max: 37.2 (14 Mar 2019 16:14)  T(F): 98.2 (15 Mar 2019 08:23), Max: 98.9 (14 Mar 2019 16:14)  HR: 110 (15 Mar 2019 08:45) (88 - 112)  BP: 159/93 (15 Mar 2019 08:23) (131/90 - 167/91)  BP(mean): --  RR: 18 (15 Mar 2019 08:23) (18 - 19)  SpO2: 90% (15 Mar 2019 08:45) (90% - 98%)                          15.7   8.9   )-----------( 402      ( 14 Mar 2019 11:07 )             47.5   03-14    136  |  98  |  14.0  ----------------------------<  165<H>  4.4   |  23.0  |  1.01    Ca    9.4      14 Mar 2019 11:07    TPro  9.3<H>  /  Alb  4.9  /  TBili  0.5  /  DBili  x   /  AST  36  /  ALT  36  /  AlkPhos  125<H>  03-14      MEDICATIONS  (STANDING):  ALBUTerol/ipratropium for Nebulization 3 milliLiter(s) Nebulizer every 6 hours  amLODIPine   Tablet 10 milliGRAM(s) Oral daily  azithromycin   Tablet 250 milliGRAM(s) Oral daily  dextrose 5%. 1000 milliLiter(s) (50 mL/Hr) IV Continuous <Continuous>  dextrose 50% Injectable 12.5 Gram(s) IV Push once  dextrose 50% Injectable 25 Gram(s) IV Push once  dextrose 50% Injectable 25 Gram(s) IV Push once  enoxaparin Injectable 40 milliGRAM(s) SubCutaneous every 24 hours  folic acid 1 milliGRAM(s) Oral daily  insulin lispro (HumaLOG) corrective regimen sliding scale   SubCutaneous three times a day before meals  methylPREDNISolone sodium succinate Injectable 40 milliGRAM(s) IV Push every 6 hours  multivitamin 1 Tablet(s) Oral daily  pantoprazole    Tablet 40 milliGRAM(s) Oral before breakfast    MEDICATIONS  (PRN):  acetaminophen   Tablet .. 650 milliGRAM(s) Oral every 6 hours PRN Temp greater or equal to 38C (100.4F), Mild Pain (1 - 3), Moderate Pain (4 - 6)  ALBUTerol/ipratropium for Nebulization 3 milliLiter(s) Nebulizer every 2 hours PRN Shortness of Breath and/or Wheezing  dextrose 40% Gel 15 Gram(s) Oral once PRN Blood Glucose LESS THAN 70 milliGRAM(s)/deciliter  glucagon  Injectable 1 milliGRAM(s) IntraMuscular once PRN Glucose LESS THAN 70 milligrams/deciliter  HYDROcodone/homatropine Syrup 5 milliLiter(s) Oral every 4 hours PRN Cough  LORazepam   Injectable 2 milliGRAM(s) IV Push every 2 hours PRN CIWA-Ar score increase by 2 points and a total score of 7 or less Lady Nam 67 y/o male MR# [id number]    Overnight event:  Seen and examined at bedside. Pt sitting up in bed on breathing treatment. Pt states he feels more improved today than yesterday. He states that he continues to have "coughing fits." No chest pain.     HPI  67 yo M w/ hx DM2, HTN, COPD, ex smoker presents to ER for progressive shortness of breath and LE.  Started 1 week prior after kalli a "cold".  Post acute illness, developed worsening shortness of breath, no cough/chest pain.  Seen by pmd and referred to ER.  no improvement with home nebs or home inhalers.  notes compliance with medications.     ROS:  Constitutional: No fevers, no chills, no fatigue  ENT/Mouth: No Hearing Changes, no ear pain, No Nasal Congestion  Cardiovascular: No Chest Pain, no palpitations  Respiratory: +cough, +phlegm, +sob, +LE  Gastrointestinal: No Nausea, No Vomiting, No Diarrhea, No Constipation, No Pain,   Genitourinary: no dysuria, no urinary frequency  Musculoskeletal: No pain, no weakness  Skin: No rash, No lesion  Neuro: No Weakness, No Numbness, No Paresthesias, No Loss of Consciousness  Psych: No Anxiety/Panic, No Depression, No Insomnia,   Heme/Lymph: No Bruising, No Bleeding    PHYSICAL EXAMINATION:    GENERAL: The patient is awake and alert in no apparent distress.   HEENT: Head is normocephalic and atraumatic. Extraocular muscles are intact. Mucous membranes are moist.  NECK: Supple.  LUNGS: diminished bs, mid and end exp wheezes.  HEART: Regular rate and rhythm without murmur.  ABDOMEN: Soft, nontender, and nondistended.    EXTREMITIES: Without any cyanosis, clubbing, rash, lesions or edema.  NEUROLOGIC: Grossly intact.  SKIN: No ulceration or induration present.      Vital Signs Last 24 Hrs  T(C): 36.8 (15 Mar 2019 08:23), Max: 37.2 (14 Mar 2019 16:14)  T(F): 98.2 (15 Mar 2019 08:23), Max: 98.9 (14 Mar 2019 16:14)  HR: 110 (15 Mar 2019 08:45) (88 - 112)  BP: 159/93 (15 Mar 2019 08:23) (131/90 - 167/91)  BP(mean): --  RR: 18 (15 Mar 2019 08:23) (18 - 19)  SpO2: 90% (15 Mar 2019 08:45) (90% - 98%)                          15.7   8.9   )-----------( 402      ( 14 Mar 2019 11:07 )             47.5   03-14    136  |  98  |  14.0  ----------------------------<  165<H>  4.4   |  23.0  |  1.01    Ca    9.4      14 Mar 2019 11:07    TPro  9.3<H>  /  Alb  4.9  /  TBili  0.5  /  DBili  x   /  AST  36  /  ALT  36  /  AlkPhos  125<H>  03-14      MEDICATIONS  (STANDING):  ALBUTerol/ipratropium for Nebulization 3 milliLiter(s) Nebulizer every 6 hours  amLODIPine   Tablet 10 milliGRAM(s) Oral daily  azithromycin   Tablet 250 milliGRAM(s) Oral daily  dextrose 5%. 1000 milliLiter(s) (50 mL/Hr) IV Continuous <Continuous>  dextrose 50% Injectable 12.5 Gram(s) IV Push once  dextrose 50% Injectable 25 Gram(s) IV Push once  dextrose 50% Injectable 25 Gram(s) IV Push once  enoxaparin Injectable 40 milliGRAM(s) SubCutaneous every 24 hours  folic acid 1 milliGRAM(s) Oral daily  insulin lispro (HumaLOG) corrective regimen sliding scale   SubCutaneous three times a day before meals  methylPREDNISolone sodium succinate Injectable 40 milliGRAM(s) IV Push every 6 hours  multivitamin 1 Tablet(s) Oral daily  pantoprazole    Tablet 40 milliGRAM(s) Oral before breakfast    MEDICATIONS  (PRN):  acetaminophen   Tablet .. 650 milliGRAM(s) Oral every 6 hours PRN Temp greater or equal to 38C (100.4F), Mild Pain (1 - 3), Moderate Pain (4 - 6)  ALBUTerol/ipratropium for Nebulization 3 milliLiter(s) Nebulizer every 2 hours PRN Shortness of Breath and/or Wheezing  dextrose 40% Gel 15 Gram(s) Oral once PRN Blood Glucose LESS THAN 70 milliGRAM(s)/deciliter  glucagon  Injectable 1 milliGRAM(s) IntraMuscular once PRN Glucose LESS THAN 70 milligrams/deciliter  HYDROcodone/homatropine Syrup 5 milliLiter(s) Oral every 4 hours PRN Cough  LORazepam   Injectable 2 milliGRAM(s) IV Push every 2 hours PRN CIWA-Ar score increase by 2 points and a total score of 7 or less

## 2019-03-16 LAB
GLUCOSE BLDC GLUCOMTR-MCNC: 234 MG/DL — HIGH (ref 70–99)
GLUCOSE BLDC GLUCOMTR-MCNC: 275 MG/DL — HIGH (ref 70–99)
GLUCOSE BLDC GLUCOMTR-MCNC: 297 MG/DL — HIGH (ref 70–99)
GLUCOSE BLDC GLUCOMTR-MCNC: 314 MG/DL — HIGH (ref 70–99)

## 2019-03-16 PROCEDURE — 99233 SBSQ HOSP IP/OBS HIGH 50: CPT

## 2019-03-16 RX ORDER — ALPRAZOLAM 0.25 MG
0.5 TABLET ORAL AT BEDTIME
Qty: 0 | Refills: 0 | Status: DISCONTINUED | OUTPATIENT
Start: 2019-03-16 | End: 2019-03-18

## 2019-03-16 RX ORDER — INSULIN GLARGINE 100 [IU]/ML
15 INJECTION, SOLUTION SUBCUTANEOUS AT BEDTIME
Qty: 0 | Refills: 0 | Status: DISCONTINUED | OUTPATIENT
Start: 2019-03-16 | End: 2019-03-17

## 2019-03-16 RX ADMIN — Medication 3 MILLILITER(S): at 08:58

## 2019-03-16 RX ADMIN — Medication 650 MILLIGRAM(S): at 11:40

## 2019-03-16 RX ADMIN — AZITHROMYCIN 250 MILLIGRAM(S): 500 TABLET, FILM COATED ORAL at 10:54

## 2019-03-16 RX ADMIN — Medication 4: at 16:35

## 2019-03-16 RX ADMIN — BUDESONIDE AND FORMOTEROL FUMARATE DIHYDRATE 2 PUFF(S): 160; 4.5 AEROSOL RESPIRATORY (INHALATION) at 09:03

## 2019-03-16 RX ADMIN — Medication 3 MILLILITER(S): at 03:29

## 2019-03-16 RX ADMIN — INSULIN GLARGINE 15 UNIT(S): 100 INJECTION, SOLUTION SUBCUTANEOUS at 23:00

## 2019-03-16 RX ADMIN — AMLODIPINE BESYLATE 10 MILLIGRAM(S): 2.5 TABLET ORAL at 05:10

## 2019-03-16 RX ADMIN — Medication 650 MILLIGRAM(S): at 16:37

## 2019-03-16 RX ADMIN — Medication 2 MILLIGRAM(S): at 20:29

## 2019-03-16 RX ADMIN — Medication 650 MILLIGRAM(S): at 10:52

## 2019-03-16 RX ADMIN — BUDESONIDE AND FORMOTEROL FUMARATE DIHYDRATE 2 PUFF(S): 160; 4.5 AEROSOL RESPIRATORY (INHALATION) at 20:37

## 2019-03-16 RX ADMIN — Medication 650 MILLIGRAM(S): at 05:11

## 2019-03-16 RX ADMIN — PANTOPRAZOLE SODIUM 40 MILLIGRAM(S): 20 TABLET, DELAYED RELEASE ORAL at 05:11

## 2019-03-16 RX ADMIN — Medication 6: at 10:52

## 2019-03-16 RX ADMIN — Medication 3 MILLILITER(S): at 15:46

## 2019-03-16 RX ADMIN — Medication 40 MILLIGRAM(S): at 05:10

## 2019-03-16 RX ADMIN — Medication 1 TABLET(S): at 10:54

## 2019-03-16 RX ADMIN — Medication 6: at 07:34

## 2019-03-16 RX ADMIN — Medication 40 MILLIGRAM(S): at 10:53

## 2019-03-16 RX ADMIN — Medication 40 MILLIGRAM(S): at 23:01

## 2019-03-16 RX ADMIN — Medication 1 MILLIGRAM(S): at 10:54

## 2019-03-16 RX ADMIN — Medication 650 MILLIGRAM(S): at 17:30

## 2019-03-16 RX ADMIN — ENOXAPARIN SODIUM 40 MILLIGRAM(S): 100 INJECTION SUBCUTANEOUS at 16:35

## 2019-03-16 RX ADMIN — Medication 3 MILLILITER(S): at 20:37

## 2019-03-16 NOTE — PROGRESS NOTE ADULT - ASSESSMENT
67 yo M w/ hx DM2, HTN, COPD, ex smoker presents to ER for progressive shortness of breath and LE.  Started 1 week prior after kalli a "cold".        Probelm 1)  acute exacerbation of COPD: possibly due to Bronchitis  -c/w steroids, nebs  -Z pack for bronchitis  -pulmonary consult appreciated,   -f/u blood cultures  - on Hycodan for cough    Problem 2)  HTN  Continue Norvasc    Problem 3)  DM2:   RAISS, hga1c  hold metformin.     Problem 4)  Reflux:   Continue protonix  elevate bed above 30  discussed about life style modification    Insomnia: xanax PRN    h/o ETOH abuse: CIWA protocol. no sign of withdrawal.     Agitation: follows verbal redirection. no sign of harm to self or others. however threatened towards pulmonologist saying I might wants to slap him if he comes to see me next time". counselled. Pulmonologist Dr. Arnold was informed about the threat.     DVT ppx: lovenox subq

## 2019-03-16 NOTE — PROGRESS NOTE ADULT - SUBJECTIVE AND OBJECTIVE BOX
Dr. Montelongo Hospitalist Progress Note  SANDRA MCQUEEN [id number]    Patient is a 68y old  Male who presents with a chief complaint of shortness of breath (16 Mar 2019 11:28)    HPI:  69 yo M w/ hx DM2, HTN, COPD, ex smoker presents to ER for progressive shortness of breath and LE.  Started 1 week prior after kalli a "cold".  Post acute illness, developed worsening shortness of breath, no cough/chest pain.  Seen by pmd and referred to ER.  no improvement with home nebs or home inhalers.  notes compliance with medications. (14 Mar 2019 13:22)    seen at bedside. still have bad cough, mostly dry. unable to sleep for cough, asking for insomnia meds. no hemoptysis. SOB+. no CP or palpitation. agitation+.       ROS:  CONSTITUTIONAL:  No distress.no fever/chills/fatigue/weight loss  HEENT:  Eyes:  No diplopia or blurred vision.   CARDIOVASCULAR:  No pressure, squeezing, tightness, heaviness or aching about the chest; no palpitations.no leg swelling, + orthopnea. no PND  RESPIRATORY: as per HPI  GI: no abd pain, no nausea, no vomiting, no diarrhea, no constipation. No hematochezia or melena  EXT:No leg or calf swelling  SKIN: no skin break or ulcer. No rash  CNS: No headaches. No weakness.no numbness. No depression or anxiety. No SI    T(C): 36.7 (03-16-19 @ 18:08), Max: 36.9 (03-16-19 @ 00:22)  HR: 100 (03-16-19 @ 20:32) (87 - 113)  BP: 149/78 (03-16-19 @ 18:08) (115/67 - 160/91)  RR: 18 (03-16-19 @ 18:08) (18 - 19)  SpO2: 95% (03-16-19 @ 20:32) (92% - 98%)  CAPILLARY BLOOD GLUCOSE      POCT Blood Glucose.: 234 mg/dL (16 Mar 2019 16:34)  POCT Blood Glucose.: 297 mg/dL (16 Mar 2019 10:51)  POCT Blood Glucose.: 275 mg/dL (16 Mar 2019 07:34)  POCT Blood Glucose.: 336 mg/dL (15 Mar 2019 21:58)      Physical Exam:  GENERAL: Not in distress. Alert    HEENT:  Normocephalic and atraumatic. PEARLA,EOMI  NECK: Supple.  No JVD.    CARDIOVASCULAR: RRR S1, S2. No murmur/rubs/gallop  LUNGS: BLAE+, no rales, + wheezing, no rhonchi.    ABDOMEN: ND. Soft,  NT, no guarding / rebound / rigidity. BS normoactive. No CVA tenderness.    EXTREMITIES: no cyanosis, no edema. no leg or calf TP  SKIN: no rash.   NEUROLOGIC: AAO*3. grossly  intact  PSYCHIATRIC: Calm.  No agitation.    Labs           MEDICATIONS  (STANDING):  ALBUTerol/ipratropium for Nebulization 3 milliLiter(s) Nebulizer every 6 hours  amLODIPine   Tablet 10 milliGRAM(s) Oral daily  azithromycin   Tablet 250 milliGRAM(s) Oral daily  buDESOnide 160 MICROgram(s)/formoterol 4.5 MICROgram(s) Inhaler 2 Puff(s) Inhalation two times a day  dextrose 5%. 1000 milliLiter(s) (50 mL/Hr) IV Continuous <Continuous>  dextrose 50% Injectable 12.5 Gram(s) IV Push once  dextrose 50% Injectable 25 Gram(s) IV Push once  dextrose 50% Injectable 25 Gram(s) IV Push once  enoxaparin Injectable 40 milliGRAM(s) SubCutaneous every 24 hours  folic acid 1 milliGRAM(s) Oral daily  insulin glargine Injectable (LANTUS) 10 Unit(s) SubCutaneous at bedtime  insulin lispro (HumaLOG) corrective regimen sliding scale   SubCutaneous three times a day before meals  methylPREDNISolone sodium succinate Injectable 40 milliGRAM(s) IV Push every 12 hours  multivitamin 1 Tablet(s) Oral daily  pantoprazole    Tablet 40 milliGRAM(s) Oral before breakfast    MEDICATIONS  (PRN):  acetaminophen   Tablet .. 650 milliGRAM(s) Oral every 6 hours PRN Temp greater or equal to 38C (100.4F), Mild Pain (1 - 3), Moderate Pain (4 - 6)  ALBUTerol/ipratropium for Nebulization 3 milliLiter(s) Nebulizer every 2 hours PRN Shortness of Breath and/or Wheezing  ALPRAZolam 0.5 milliGRAM(s) Oral at bedtime PRN insomnia  dextrose 40% Gel 15 Gram(s) Oral once PRN Blood Glucose LESS THAN 70 milliGRAM(s)/deciliter  glucagon  Injectable 1 milliGRAM(s) IntraMuscular once PRN Glucose LESS THAN 70 milligrams/deciliter  HYDROcodone/homatropine Syrup 5 milliLiter(s) Oral every 4 hours PRN Cough  LORazepam   Injectable 2 milliGRAM(s) IV Push every 2 hours PRN CIWA-Ar score increase by 2 points and a total score of 7 or less

## 2019-03-16 NOTE — PROGRESS NOTE ADULT - ASSESSMENT
Imp--COPD exac--clinically improved.  Cough reported by pt  ?Agitation and hyperactivity from steroids.      Plan--lower steroids  Ambulate pt   nebs.

## 2019-03-16 NOTE — PROGRESS NOTE ADULT - SUBJECTIVE AND OBJECTIVE BOX
PULMONARY PROGRESS NOTE      SANDRA MCQUEENLAURA-[id number]    Patient is a 68y old  Male who presents with a chief complaint of shortness of breath (15 Mar 2019 11:23)      INTERVAL HPI/OVERNIGHT EVENTS:  Agitated. States not sleeping due to cough.  MEDICATIONS  (STANDING):  ALBUTerol/ipratropium for Nebulization 3 milliLiter(s) Nebulizer every 6 hours  amLODIPine   Tablet 10 milliGRAM(s) Oral daily  azithromycin   Tablet 250 milliGRAM(s) Oral daily  buDESOnide 160 MICROgram(s)/formoterol 4.5 MICROgram(s) Inhaler 2 Puff(s) Inhalation two times a day  dextrose 5%. 1000 milliLiter(s) (50 mL/Hr) IV Continuous <Continuous>  dextrose 50% Injectable 12.5 Gram(s) IV Push once  dextrose 50% Injectable 25 Gram(s) IV Push once  dextrose 50% Injectable 25 Gram(s) IV Push once  enoxaparin Injectable 40 milliGRAM(s) SubCutaneous every 24 hours  folic acid 1 milliGRAM(s) Oral daily  insulin glargine Injectable (LANTUS) 10 Unit(s) SubCutaneous at bedtime  insulin lispro (HumaLOG) corrective regimen sliding scale   SubCutaneous three times a day before meals  methylPREDNISolone sodium succinate Injectable 40 milliGRAM(s) IV Push every 6 hours  multivitamin 1 Tablet(s) Oral daily  pantoprazole    Tablet 40 milliGRAM(s) Oral before breakfast      MEDICATIONS  (PRN):  acetaminophen   Tablet .. 650 milliGRAM(s) Oral every 6 hours PRN Temp greater or equal to 38C (100.4F), Mild Pain (1 - 3), Moderate Pain (4 - 6)  ALBUTerol/ipratropium for Nebulization 3 milliLiter(s) Nebulizer every 2 hours PRN Shortness of Breath and/or Wheezing  dextrose 40% Gel 15 Gram(s) Oral once PRN Blood Glucose LESS THAN 70 milliGRAM(s)/deciliter  glucagon  Injectable 1 milliGRAM(s) IntraMuscular once PRN Glucose LESS THAN 70 milligrams/deciliter  HYDROcodone/homatropine Syrup 5 milliLiter(s) Oral every 4 hours PRN Cough  LORazepam   Injectable 2 milliGRAM(s) IV Push every 2 hours PRN CIWA-Ar score increase by 2 points and a total score of 7 or less      Allergies    No Known Allergies    Intolerances        PAST MEDICAL & SURGICAL HISTORY:  Alcohol-induced acute pancreatitis, unspecified complication status  Pancreatitis  Lumbar disc herniation: L4-L5  Diabetes mellitus  Alcohol abuse, daily use: Daily Use since 2010  Hypertension  Emphysema  No significant past surgical history      SOCIAL HISTORY  Smoking History:       REVIEW OF SYSTEMS:    CONSTITUTIONAL:  No distress    HEENT:  Eyes:  No diplopia or blurred vision. ENT:  No earache, sore throat or runny nose.    CARDIOVASCULAR:  No pressure, squeezing, tightness, heaviness or aching about the chest; no palpitations.    RESPIRATORY:  per hpi    GASTROINTESTINAL:  No nausea, vomiting or diarrhea.    GENITOURINARY:  No dysuria, frequency or urgency.    MUSCULOSKELETAL:  No joint pain    SKIN:  No new lesions.    NEUROLOGIC:  No paresthesias, fasciculations, seizures or weakness.    PSYCHIATRIC:  No disorder of thought or mood.    ENDOCRINE:  No heat or cold intolerance, polyuria or polydipsia.    HEMATOLOGICAL:  No easy bruising or bleeding.     Vital Signs Last 24 Hrs  T(C): 36.8 (16 Mar 2019 08:15), Max: 37.2 (15 Mar 2019 15:54)  T(F): 98.2 (16 Mar 2019 08:15), Max: 98.9 (15 Mar 2019 15:54)  HR: 92 (16 Mar 2019 09:12) (86 - 108)  BP: 160/91 (16 Mar 2019 08:15) (115/67 - 160/91)  BP(mean): --  RR: 18 (16 Mar 2019 08:15) (18 - 19)  SpO2: 93% (16 Mar 2019 09:12) (92% - 95%)    PHYSICAL EXAMINATION:    GENERAL: The patient is awake and alert in no apparent distress.     HEENT: Head is normocephalic and atraumatic. Extraocular muscles are intact. Mucous membranes are moist.    NECK: Supple.    LUNGS: diminished bs clear    HEART: Regular rate and rhythm without murmur.    ABDOMEN: Soft, nontender, and nondistended.      EXTREMITIES: Without any cyanosis, clubbing, rash, lesions or edema.    NEUROLOGIC: Grossly intact.    SKIN: No ulceration or induration present.      LABS:                              MICROBIOLOGY:    RADIOLOGY & ADDITIONAL STUDIES:

## 2019-03-17 LAB
ALBUMIN SERPL ELPH-MCNC: 3.8 G/DL — SIGNIFICANT CHANGE UP (ref 3.3–5.2)
ALP SERPL-CCNC: 76 U/L — SIGNIFICANT CHANGE UP (ref 40–120)
ALT FLD-CCNC: 25 U/L — SIGNIFICANT CHANGE UP
ANION GAP SERPL CALC-SCNC: 10 MMOL/L — SIGNIFICANT CHANGE UP (ref 5–17)
AST SERPL-CCNC: 21 U/L — SIGNIFICANT CHANGE UP
BILIRUB SERPL-MCNC: 0.3 MG/DL — LOW (ref 0.4–2)
BUN SERPL-MCNC: 19 MG/DL — SIGNIFICANT CHANGE UP (ref 8–20)
CALCIUM SERPL-MCNC: 8.8 MG/DL — SIGNIFICANT CHANGE UP (ref 8.6–10.2)
CHLORIDE SERPL-SCNC: 100 MMOL/L — SIGNIFICANT CHANGE UP (ref 98–107)
CO2 SERPL-SCNC: 26 MMOL/L — SIGNIFICANT CHANGE UP (ref 22–29)
CREAT SERPL-MCNC: 0.8 MG/DL — SIGNIFICANT CHANGE UP (ref 0.5–1.3)
GLUCOSE BLDC GLUCOMTR-MCNC: 175 MG/DL — HIGH (ref 70–99)
GLUCOSE BLDC GLUCOMTR-MCNC: 238 MG/DL — HIGH (ref 70–99)
GLUCOSE BLDC GLUCOMTR-MCNC: 249 MG/DL — HIGH (ref 70–99)
GLUCOSE BLDC GLUCOMTR-MCNC: 281 MG/DL — HIGH (ref 70–99)
GLUCOSE SERPL-MCNC: 283 MG/DL — HIGH (ref 70–115)
HCT VFR BLD CALC: 39 % — LOW (ref 42–52)
HGB BLD-MCNC: 12.6 G/DL — LOW (ref 14–18)
MCHC RBC-ENTMCNC: 29.9 PG — SIGNIFICANT CHANGE UP (ref 27–31)
MCHC RBC-ENTMCNC: 32.3 G/DL — SIGNIFICANT CHANGE UP (ref 32–36)
MCV RBC AUTO: 92.6 FL — SIGNIFICANT CHANGE UP (ref 80–94)
PLATELET # BLD AUTO: 370 K/UL — SIGNIFICANT CHANGE UP (ref 150–400)
POTASSIUM SERPL-MCNC: 4.6 MMOL/L — SIGNIFICANT CHANGE UP (ref 3.5–5.3)
POTASSIUM SERPL-SCNC: 4.6 MMOL/L — SIGNIFICANT CHANGE UP (ref 3.5–5.3)
PROT SERPL-MCNC: 7.1 G/DL — SIGNIFICANT CHANGE UP (ref 6.6–8.7)
RBC # BLD: 4.21 M/UL — LOW (ref 4.6–6.2)
RBC # FLD: 13.5 % — SIGNIFICANT CHANGE UP (ref 11–15.6)
SODIUM SERPL-SCNC: 136 MMOL/L — SIGNIFICANT CHANGE UP (ref 135–145)
WBC # BLD: 10.9 K/UL — HIGH (ref 4.8–10.8)
WBC # FLD AUTO: 10.9 K/UL — HIGH (ref 4.8–10.8)

## 2019-03-17 PROCEDURE — 99232 SBSQ HOSP IP/OBS MODERATE 35: CPT

## 2019-03-17 RX ORDER — INSULIN LISPRO 100/ML
8 VIAL (ML) SUBCUTANEOUS
Qty: 0 | Refills: 0 | Status: DISCONTINUED | OUTPATIENT
Start: 2019-03-17 | End: 2019-03-17

## 2019-03-17 RX ORDER — INSULIN LISPRO 100/ML
5 VIAL (ML) SUBCUTANEOUS
Qty: 0 | Refills: 0 | Status: DISCONTINUED | OUTPATIENT
Start: 2019-03-17 | End: 2019-03-18

## 2019-03-17 RX ORDER — INSULIN GLARGINE 100 [IU]/ML
15 INJECTION, SOLUTION SUBCUTANEOUS AT BEDTIME
Qty: 0 | Refills: 0 | Status: DISCONTINUED | OUTPATIENT
Start: 2019-03-17 | End: 2019-03-18

## 2019-03-17 RX ORDER — INSULIN GLARGINE 100 [IU]/ML
15 INJECTION, SOLUTION SUBCUTANEOUS AT BEDTIME
Qty: 0 | Refills: 0 | Status: DISCONTINUED | OUTPATIENT
Start: 2019-03-17 | End: 2019-03-17

## 2019-03-17 RX ORDER — INSULIN LISPRO 100/ML
5 VIAL (ML) SUBCUTANEOUS
Qty: 0 | Refills: 0 | Status: DISCONTINUED | OUTPATIENT
Start: 2019-03-17 | End: 2019-03-17

## 2019-03-17 RX ORDER — INSULIN GLARGINE 100 [IU]/ML
20 INJECTION, SOLUTION SUBCUTANEOUS AT BEDTIME
Qty: 0 | Refills: 0 | Status: DISCONTINUED | OUTPATIENT
Start: 2019-03-17 | End: 2019-03-17

## 2019-03-17 RX ADMIN — Medication 5 UNIT(S): at 12:09

## 2019-03-17 RX ADMIN — Medication 5 UNIT(S): at 16:46

## 2019-03-17 RX ADMIN — Medication 3 MILLILITER(S): at 03:42

## 2019-03-17 RX ADMIN — Medication 650 MILLIGRAM(S): at 20:00

## 2019-03-17 RX ADMIN — Medication 3 MILLILITER(S): at 14:54

## 2019-03-17 RX ADMIN — Medication 40 MILLIGRAM(S): at 17:30

## 2019-03-17 RX ADMIN — AMLODIPINE BESYLATE 10 MILLIGRAM(S): 2.5 TABLET ORAL at 05:16

## 2019-03-17 RX ADMIN — Medication 1 TABLET(S): at 12:07

## 2019-03-17 RX ADMIN — Medication 4: at 16:46

## 2019-03-17 RX ADMIN — Medication 4: at 12:08

## 2019-03-17 RX ADMIN — Medication 2 MILLIGRAM(S): at 03:46

## 2019-03-17 RX ADMIN — Medication 1 MILLIGRAM(S): at 12:07

## 2019-03-17 RX ADMIN — AZITHROMYCIN 250 MILLIGRAM(S): 500 TABLET, FILM COATED ORAL at 12:07

## 2019-03-17 RX ADMIN — Medication 650 MILLIGRAM(S): at 19:36

## 2019-03-17 RX ADMIN — Medication 40 MILLIGRAM(S): at 05:15

## 2019-03-17 RX ADMIN — BUDESONIDE AND FORMOTEROL FUMARATE DIHYDRATE 2 PUFF(S): 160; 4.5 AEROSOL RESPIRATORY (INHALATION) at 09:00

## 2019-03-17 RX ADMIN — PANTOPRAZOLE SODIUM 40 MILLIGRAM(S): 20 TABLET, DELAYED RELEASE ORAL at 05:16

## 2019-03-17 RX ADMIN — Medication 6: at 07:56

## 2019-03-17 RX ADMIN — Medication 3 MILLILITER(S): at 08:59

## 2019-03-17 RX ADMIN — Medication 0.5 MILLIGRAM(S): at 23:55

## 2019-03-17 RX ADMIN — BUDESONIDE AND FORMOTEROL FUMARATE DIHYDRATE 2 PUFF(S): 160; 4.5 AEROSOL RESPIRATORY (INHALATION) at 20:16

## 2019-03-17 RX ADMIN — INSULIN GLARGINE 15 UNIT(S): 100 INJECTION, SOLUTION SUBCUTANEOUS at 22:22

## 2019-03-17 RX ADMIN — ENOXAPARIN SODIUM 40 MILLIGRAM(S): 100 INJECTION SUBCUTANEOUS at 16:47

## 2019-03-17 RX ADMIN — Medication 3 MILLILITER(S): at 20:16

## 2019-03-17 RX ADMIN — Medication 650 MILLIGRAM(S): at 13:03

## 2019-03-17 RX ADMIN — Medication 650 MILLIGRAM(S): at 12:07

## 2019-03-17 NOTE — PROGRESS NOTE ADULT - SUBJECTIVE AND OBJECTIVE BOX
Dr. Montelongo Hospitalist Progress Note  SANDRA MCQUEEN [id number]    Patient is a 68y old  Male who presents with a chief complaint of shortness of breath (16 Mar 2019 11:28)    HPI:  67 yo M w/ hx DM2, HTN, COPD, ex smoker presents to ER for progressive shortness of breath and LE.  Started 1 week prior after kalli a "cold".  Post acute illness, developed worsening shortness of breath, no cough/chest pain.  Seen by pmd and referred to ER.  no improvement with home nebs or home inhalers.  notes compliance with medications. (14 Mar 2019 13:22)    seen at bedside. still have bad cough, mostly dry. no hemoptysis. SOB+. no CP or palpitation. agitation+. slept well with xanax last night. needs home oxygen.      ROS:  CONSTITUTIONAL:  No distress.no fever/chills   CARDIOVASCULAR:  No pressure, squeezing, tightness, heaviness or aching about the chest; no palpitations.no leg swelling, + orthopnea. no PND  RESPIRATORY: as per HPI  GI: no abd pain, no nausea, no vomiting, no diarrhea, no constipation. No hematochezia or melena  EXT:No leg or calf swelling  SKIN: no skin break or ulcer. No rash  CNS: No headaches. No weakness.no numbness.     Vital Signs Last 24 Hrs  T(C): 36.9 (17 Mar 2019 16:15), Max: 36.9 (17 Mar 2019 16:15)  T(F): 98.5 (17 Mar 2019 16:15), Max: 98.5 (17 Mar 2019 16:15)  HR: 106 (17 Mar 2019 16:15) (81 - 106)  BP: 146/83 (17 Mar 2019 16:15) (132/80 - 146/83)  BP(mean): --  RR: 20 (17 Mar 2019 16:15) (18 - 20)  SpO2: 86% (17 Mar 2019 17:27) (86% - 99%)    CAPILLARY BLOOD GLUCOSE      POCT Blood Glucose.: 249 mg/dL (17 Mar 2019 16:44)  POCT Blood Glucose.: 238 mg/dL (17 Mar 2019 12:04)  POCT Blood Glucose.: 281 mg/dL (17 Mar 2019 07:55)  POCT Blood Glucose.: 314 mg/dL (16 Mar 2019 23:00)    Physical Exam:  GENERAL: Not in distress. Alert    HEENT:  Normocephalic and atraumatic. no pallor or icterus  NECK: Supple.  CARDIOVASCULAR: RRR S1, S2. No murmur/rubs/gallop  LUNGS: BLAE+, no rales, + wheezing, no rhonchi.    ABDOMEN: ND. Soft,  NT, no guarding / rebound / rigidity.   EXTREMITIES: no cyanosis, no edema. no leg or calf TP  SKIN: no rash.   NEUROLOGIC: AAO*3. grossly  intact  PSYCHIATRIC: + agitation.    Labs                          12.6   10.9  )-----------( 370      ( 17 Mar 2019 06:55 )             39.0     03-17    136  |  100  |  19.0  ----------------------------<  283<H>  4.6   |  26.0  |  0.80    Ca    8.8      17 Mar 2019 06:55    TPro  7.1  /  Alb  3.8  /  TBili  0.3<L>  /  DBili  x   /  AST  21  /  ALT  25  /  AlkPhos  76  03-17    Culture - Blood (03.14.19 @ 11:27)    Specimen Source: .Blood    Culture Results:   No growth at 48 hours    Culture - Blood (03.14.19 @ 11:26)    Specimen Source: .Blood    Culture Results:   No growth at 48 hours    Hemoglobin A1C, Whole Blood (03.15.19 @ 07:32)    Hemoglobin A1C, Whole Blood: 7.6 %         MEDICATIONS  (STANDING):  ALBUTerol/ipratropium for Nebulization 3 milliLiter(s) Nebulizer every 6 hours  amLODIPine   Tablet 10 milliGRAM(s) Oral daily  azithromycin   Tablet 250 milliGRAM(s) Oral daily  buDESOnide 160 MICROgram(s)/formoterol 4.5 MICROgram(s) Inhaler 2 Puff(s) Inhalation two times a day  dextrose 5%. 1000 milliLiter(s) (50 mL/Hr) IV Continuous <Continuous>  dextrose 50% Injectable 12.5 Gram(s) IV Push once  dextrose 50% Injectable 25 Gram(s) IV Push once  dextrose 50% Injectable 25 Gram(s) IV Push once  enoxaparin Injectable 40 milliGRAM(s) SubCutaneous every 24 hours  folic acid 1 milliGRAM(s) Oral daily  insulin glargine Injectable (LANTUS) 15 Unit(s) SubCutaneous at bedtime  insulin lispro (HumaLOG) corrective regimen sliding scale   SubCutaneous three times a day before meals  insulin lispro Injectable (HumaLOG) 5 Unit(s) SubCutaneous three times a day before meals  methylPREDNISolone sodium succinate Injectable 40 milliGRAM(s) IV Push every 12 hours  multivitamin 1 Tablet(s) Oral daily  pantoprazole    Tablet 40 milliGRAM(s) Oral before breakfast    MEDICATIONS  (PRN):  acetaminophen   Tablet .. 650 milliGRAM(s) Oral every 6 hours PRN Temp greater or equal to 38C (100.4F), Mild Pain (1 - 3), Moderate Pain (4 - 6)  ALBUTerol/ipratropium for Nebulization 3 milliLiter(s) Nebulizer every 2 hours PRN Shortness of Breath and/or Wheezing  ALPRAZolam 0.5 milliGRAM(s) Oral at bedtime PRN insomnia  dextrose 40% Gel 15 Gram(s) Oral once PRN Blood Glucose LESS THAN 70 milliGRAM(s)/deciliter  glucagon  Injectable 1 milliGRAM(s) IntraMuscular once PRN Glucose LESS THAN 70 milligrams/deciliter  HYDROcodone/homatropine Syrup 5 milliLiter(s) Oral every 4 hours PRN Cough  LORazepam   Injectable 2 milliGRAM(s) IV Push every 2 hours PRN CIWA-Ar score increase by 2 points and a total score of 7 or less

## 2019-03-17 NOTE — PROGRESS NOTE ADULT - ASSESSMENT
67 yo M w/ hx DM2, HTN, COPD, ex smoker presents to ER for progressive shortness of breath and LE.  Started 1 week prior after kalli a "cold".        Probelm 1)  acute exacerbation of COPD: possibly due to Bronchitis  -c/w steroids, nebs. switch to PO tomorrow am  -Z pack for bronchitis  -pulmonary consult appreciated,   - neg blood cultures  - on Hycodan for cough  patient desat on RA during ambulation. needs home oxygen    Problem 2)  HTN  Continue Norvasc    Problem 3)  DM2:   RAISS, hga1c 7.6  hold metformin.   add lantus/lispo for better control    Problem 4)  Reflux:   Continue protonix  elevate bed above 30  discussed about life style modification    Insomnia: xanax PRN    h/o ETOH abuse: CIWA protocol. no sign of withdrawal.     Agitation: follows verbal redirection. no sign of harm to self or others. however threatened towards pulmonologist saying I might wants to slap him if he comes to see me next time". counselled. Pulmonologist Dr. Arnold was informed about the threat.     DVT ppx: lovenox subq    Dispo: anticipates DC tomorrow needs home oxygen

## 2019-03-18 ENCOUNTER — TRANSCRIPTION ENCOUNTER (OUTPATIENT)
Age: 69
End: 2019-03-18

## 2019-03-18 VITALS
OXYGEN SATURATION: 96 % | HEART RATE: 99 BPM | SYSTOLIC BLOOD PRESSURE: 151 MMHG | RESPIRATION RATE: 18 BRPM | TEMPERATURE: 98 F | DIASTOLIC BLOOD PRESSURE: 87 MMHG

## 2019-03-18 LAB
GLUCOSE BLDC GLUCOMTR-MCNC: 232 MG/DL — HIGH (ref 70–99)
GLUCOSE BLDC GLUCOMTR-MCNC: 252 MG/DL — HIGH (ref 70–99)

## 2019-03-18 PROCEDURE — 71045 X-RAY EXAM CHEST 1 VIEW: CPT

## 2019-03-18 PROCEDURE — 94640 AIRWAY INHALATION TREATMENT: CPT

## 2019-03-18 PROCEDURE — 86803 HEPATITIS C AB TEST: CPT

## 2019-03-18 PROCEDURE — 87581 M.PNEUMON DNA AMP PROBE: CPT

## 2019-03-18 PROCEDURE — 96368 THER/DIAG CONCURRENT INF: CPT

## 2019-03-18 PROCEDURE — 83605 ASSAY OF LACTIC ACID: CPT

## 2019-03-18 PROCEDURE — 99239 HOSP IP/OBS DSCHRG MGMT >30: CPT

## 2019-03-18 PROCEDURE — 97163 PT EVAL HIGH COMPLEX 45 MIN: CPT

## 2019-03-18 PROCEDURE — 82962 GLUCOSE BLOOD TEST: CPT

## 2019-03-18 PROCEDURE — 96365 THER/PROPH/DIAG IV INF INIT: CPT

## 2019-03-18 PROCEDURE — 93005 ELECTROCARDIOGRAM TRACING: CPT

## 2019-03-18 PROCEDURE — 87633 RESP VIRUS 12-25 TARGETS: CPT

## 2019-03-18 PROCEDURE — 85027 COMPLETE CBC AUTOMATED: CPT

## 2019-03-18 PROCEDURE — 96375 TX/PRO/DX INJ NEW DRUG ADDON: CPT

## 2019-03-18 PROCEDURE — 36415 COLL VENOUS BLD VENIPUNCTURE: CPT

## 2019-03-18 PROCEDURE — 87040 BLOOD CULTURE FOR BACTERIA: CPT

## 2019-03-18 PROCEDURE — 80053 COMPREHEN METABOLIC PANEL: CPT

## 2019-03-18 PROCEDURE — 99285 EMERGENCY DEPT VISIT HI MDM: CPT | Mod: 25

## 2019-03-18 PROCEDURE — 83036 HEMOGLOBIN GLYCOSYLATED A1C: CPT

## 2019-03-18 PROCEDURE — 87798 DETECT AGENT NOS DNA AMP: CPT

## 2019-03-18 PROCEDURE — 83690 ASSAY OF LIPASE: CPT

## 2019-03-18 PROCEDURE — 87486 CHLMYD PNEUM DNA AMP PROBE: CPT

## 2019-03-18 RX ORDER — ACETAMINOPHEN 500 MG
2 TABLET ORAL
Qty: 0 | Refills: 0 | COMMUNITY
Start: 2019-03-18

## 2019-03-18 RX ORDER — PANTOPRAZOLE SODIUM 20 MG/1
1 TABLET, DELAYED RELEASE ORAL
Qty: 0 | Refills: 0 | COMMUNITY
Start: 2019-03-18

## 2019-03-18 RX ORDER — GLIMEPIRIDE 1 MG
1 TABLET ORAL
Qty: 10 | Refills: 0 | OUTPATIENT
Start: 2019-03-18

## 2019-03-18 RX ORDER — LANOLIN ALCOHOL/MO/W.PET/CERES
1 CREAM (GRAM) TOPICAL
Qty: 10 | Refills: 0 | OUTPATIENT
Start: 2019-03-18

## 2019-03-18 RX ORDER — AMLODIPINE BESYLATE 2.5 MG/1
1 TABLET ORAL
Qty: 0 | Refills: 0 | COMMUNITY
Start: 2019-03-18

## 2019-03-18 RX ORDER — BUDESONIDE AND FORMOTEROL FUMARATE DIHYDRATE 160; 4.5 UG/1; UG/1
1 AEROSOL RESPIRATORY (INHALATION)
Qty: 1 | Refills: 0 | OUTPATIENT
Start: 2019-03-18

## 2019-03-18 RX ADMIN — PANTOPRAZOLE SODIUM 40 MILLIGRAM(S): 20 TABLET, DELAYED RELEASE ORAL at 04:55

## 2019-03-18 RX ADMIN — Medication 1 TABLET(S): at 13:01

## 2019-03-18 RX ADMIN — Medication 5 UNIT(S): at 08:22

## 2019-03-18 RX ADMIN — Medication 650 MILLIGRAM(S): at 13:01

## 2019-03-18 RX ADMIN — Medication 5 UNIT(S): at 13:00

## 2019-03-18 RX ADMIN — Medication 3 MILLILITER(S): at 09:02

## 2019-03-18 RX ADMIN — AZITHROMYCIN 250 MILLIGRAM(S): 500 TABLET, FILM COATED ORAL at 13:01

## 2019-03-18 RX ADMIN — Medication 4: at 13:01

## 2019-03-18 RX ADMIN — Medication 650 MILLIGRAM(S): at 04:45

## 2019-03-18 RX ADMIN — AMLODIPINE BESYLATE 10 MILLIGRAM(S): 2.5 TABLET ORAL at 04:55

## 2019-03-18 RX ADMIN — Medication 1 MILLIGRAM(S): at 13:01

## 2019-03-18 RX ADMIN — Medication 60 MILLIGRAM(S): at 04:55

## 2019-03-18 RX ADMIN — Medication 6: at 08:21

## 2019-03-18 RX ADMIN — BUDESONIDE AND FORMOTEROL FUMARATE DIHYDRATE 2 PUFF(S): 160; 4.5 AEROSOL RESPIRATORY (INHALATION) at 09:07

## 2019-03-18 NOTE — DISCHARGE NOTE PROVIDER - NSDCHHNEEDSERVICEOTHER_GEN_ALL_CORE_FT
Pelvis and lower back.  Educated on pain scale and management.  Verbalized understanding.
help in ADL and IADL

## 2019-03-18 NOTE — DISCHARGE NOTE PROVIDER - HOSPITAL COURSE
Assessment and Plan:     · Assessment        69 yo M w/ hx DM2, HTN, COPD, ex smoker presents to ER for progressive shortness of breath and LE.    Started 1 week prior after kalli a "cold".              Probelm 1)    acute exacerbation of COPD: possibly due to Bronchitis: much better    -c/w steroids, nebs. switch to PO today    -Z pack for bronchitis, will be completed today    -pulmonary consult appreciated,     - neg blood cultures    - on Hycodan for cough    patient desat on RA during ambulation. needs home oxygen    counselled to avoid allergens and smokes. patient lives in a apertment where neighbors smokes and comes to his room. asking me to write a letter to [employer] saying he needs non-smoking apartment        Problem 2)    HTN    Continue Norvasc        Problem 3)    DM2:     RAISS, hga1c 7.6    hold metformin.     add lantus/lispo for better control            Problem 4)    Reflux:     Continue protonix    elevate bed above 30    discussed about life style modification        Insomnia: xanax PRN        h/o ETOH abuse: CIWA protocol. no sign of withdrawal.         Agitation: follows verbal redirection. no sign of harm to self or others. no agitation today        DVT ppx: lovenox subq        Dispo: anticipates DC today. pending PT eval. needs Home oxygen, home care Assessment and Plan:     · Assessment        69 yo M w/ hx DM2, HTN, COPD, ex smoker presents to ER for progressive shortness of breath and LE.    Started 1 week prior after kalli a "cold".              Probelm 1)    acute exacerbation of COPD: possibly due to Bronchitis: much better    -c/w steroids, nebs. switch to PO today    -Z pack for bronchitis, will be completed today    -pulmonary consult appreciated,     - neg blood cultures    - on Hycodan for cough    patient desat on RA during ambulation. needs home oxygen    counselled to avoid allergens and smokes. patient lives in a apertment where neighbors smokes and comes to his room. asking me to write a letter to [employer] saying he needs non-smoking apartment        Problem 2)    HTN    Continue Norvasc        Problem 3)    DM2:     RAISS, hga1c 7.6    hold metformin.     add lantus/lispo for better control            Problem 4)    Reflux:     Continue protonix    elevate bed above 30    discussed about life style modification        Insomnia: xanax PRN        h/o ETOH abuse: CIWA protocol. no sign of withdrawal.         Agitation: follows verbal redirection. no sign of harm to self or others. no agitation today        DVT ppx: lovenox subq        Dispo: stable for DC today. seen by PT kevin, home PT. arranged Home PT, home oxygen and, home care        plan of care was discussed with the patient. return precautions discussed. patient verbalized understanding        Time spent 40 min

## 2019-03-18 NOTE — DISCHARGE NOTE PROVIDER - NSDCCPCAREPLAN_GEN_ALL_CORE_FT
PRINCIPAL DISCHARGE DIAGNOSIS  Diagnosis: COPD exacerbation  Assessment and Plan of Treatment: continue med. see pulmonary in 1-2 weks and stephani FLORES in 3-5 days. avoid allergens and smoking      SECONDARY DISCHARGE DIAGNOSES  Diagnosis: Insomnia  Assessment and Plan of Treatment: take melatonin as needed for insomnia.    Diagnosis: Type 2 diabetes mellitus  Assessment and Plan of Treatment: continue metformin, continue glipizide until you completed prednisone treatment as prednisone can get your bloos sugar high. see flori FLORES in 3-5 days with blood sugar reading to adjust meds. A1c 7.6

## 2019-03-18 NOTE — PHYSICAL THERAPY INITIAL EVALUATION ADULT - CRITERIA FOR SKILLED THERAPEUTIC INTERVENTIONS
functional limitations in following categories/therapy frequency/anticipated discharge recommendation/impairments found/rehab potential/predicted duration of therapy intervention

## 2019-03-18 NOTE — DISCHARGE NOTE PROVIDER - CARE PROVIDER_API CALL
Jose M Hills)  Internal Medicine; Pulmonary Disease  [facility], [address] [place] [address]  Phone: [phone]  Fax: [phone]  Follow Up Time:

## 2019-03-18 NOTE — PHYSICAL THERAPY INITIAL EVALUATION ADULT - ADDITIONAL COMMENTS
6 steps 1 rail to enter home, 5 steps 1 rail within house to bedroom, owns and uses a SAC, wife able to assist prn

## 2019-03-18 NOTE — PROGRESS NOTE ADULT - ASSESSMENT
69 yo M w/ hx DM2, HTN, COPD, ex smoker presents to ER for progressive shortness of breath and LE.  Started 1 week prior after kalli a "cold".        Probelm 1)  acute exacerbation of COPD: possibly due to Bronchitis: much better  -c/w steroids, nebs. switch to PO today  -Z pack for bronchitis, will be completed today  -pulmonary consult appreciated,   - neg blood cultures  - on Hycodan for cough  patient desat on RA during ambulation. needs home oxygen  counselled to avoid allergens and smokes. patient lives in a apertment where neighbors smokes and comes to his room. asking me to write a letter to [employer] saying he needs non-smoking apartment    Problem 2)  HTN  Continue Norvasc    Problem 3)  DM2:   RAISS, hga1c 7.6  hold metformin.   add lantus/lispo for better control      Problem 4)  Reflux:   Continue protonix  elevate bed above 30  discussed about life style modification    Insomnia: xanax PRN    h/o ETOH abuse: CIWA protocol. no sign of withdrawal.     Agitation: follows verbal redirection. no sign of harm to self or others. no agitation today    DVT ppx: lovenox subq    Dispo: anticipates DC today. pending PT eval. needs Home oxygen, home care

## 2019-03-18 NOTE — PROGRESS NOTE ADULT - SUBJECTIVE AND OBJECTIVE BOX
Dr. Montelongo Hospitalist Progress Note  SANDRA MCQUEEN [id number]    Patient is a 68y old  Male who presents with a chief complaint of shortness of breath (16 Mar 2019 11:28)    HPI:  69 yo M w/ hx DM2, HTN, COPD, ex smoker presents to ER for progressive shortness of breath and LE.  Started 1 week prior after kalli a "cold".  Post acute illness, developed worsening shortness of breath, no cough/chest pain.  Seen by pmd and referred to ER.  no improvement with home nebs or home inhalers.  notes compliance with medications. (14 Mar 2019 13:22)    seen at bedside. cough and SOB better. wants to go home. cough dry. hard time bringing sputum. no hemoptysis. SOB+. No CP or palpitation. no agitation. slept well with xanax last night. needs home oxygen/HHA/PT eval      ROS:  CONSTITUTIONAL:  No distress.no fever/chills   CARDIOVASCULAR:  No pressure, squeezing, tightness, heaviness or aching about the chest; no palpitations.no leg swelling, + orthopnea. no PND  RESPIRATORY: as per HPI  GI: no abd pain, no nausea, no vomiting, no diarrhea, no constipation. No hematochezia or melena  EXT:No leg or calf swelling  SKIN: no skin break or ulcer. No rash  CNS: No headaches. No weakness.no numbness.     Vital Signs Last 24 Hrs  T(C): 36.4 (18 Mar 2019 08:02), Max: 36.9 (17 Mar 2019 16:15)  T(F): 97.6 (18 Mar 2019 08:02), Max: 98.5 (17 Mar 2019 16:15)  HR: 98 (18 Mar 2019 08:02) (88 - 106)  BP: 161/65 (18 Mar 2019 08:02) (146/83 - 161/65)  BP(mean): --  RR: 18 (18 Mar 2019 08:02) (18 - 20)  SpO2: 95% (18 Mar 2019 08:02) (86% - 96%)    CAPILLARY BLOOD GLUCOSE      POCT Blood Glucose.: 252 mg/dL (18 Mar 2019 07:54)  POCT Blood Glucose.: 175 mg/dL (17 Mar 2019 21:13)  POCT Blood Glucose.: 249 mg/dL (17 Mar 2019 16:44)  POCT Blood Glucose.: 238 mg/dL (17 Mar 2019 12:04)    I&O's Detail    Physical Exam:  GENERAL: Not in distress. Alert    HEENT:  Normocephalic and atraumatic. no pallor or icterus  NECK: Supple.  CARDIOVASCULAR: RRR S1, S2. No murmur/rubs/gallop  LUNGS: BLAE+, no rales, a very few wheezing, no rhonchi.    ABDOMEN: ND. Soft,  NT, no guarding / rebound / rigidity.   EXTREMITIES: no cyanosis, no edema. no leg or calf TP  SKIN: no rash.   NEUROLOGIC: AAO*3. grossly  intact  PSYCHIATRIC: no agitation.    Labs                                     12.6   10.9  )-----------( 370      ( 17 Mar 2019 06:55 )             39.0       03-17    136  |  100  |  19.0  ----------------------------<  283<H>  4.6   |  26.0  |  0.80    Ca    8.8      17 Mar 2019 06:55    TPro  7.1  /  Alb  3.8  /  TBili  0.3<L>  /  DBili  x   /  AST  21  /  ALT  25  /  AlkPhos  76  03-17      Culture - Blood (03.14.19 @ 11:27)    Specimen Source: .Blood    Culture Results:   No growth at 48 hours    Culture - Blood (03.14.19 @ 11:26)    Specimen Source: .Blood    Culture Results:   No growth at 48 hours    Hemoglobin A1C, Whole Blood (03.15.19 @ 07:32)    Hemoglobin A1C, Whole Blood: 7.6 %       MEDICATIONS  (STANDING):  ALBUTerol/ipratropium for Nebulization 3 milliLiter(s) Nebulizer every 6 hours  amLODIPine   Tablet 10 milliGRAM(s) Oral daily  azithromycin   Tablet 250 milliGRAM(s) Oral daily  buDESOnide 160 MICROgram(s)/formoterol 4.5 MICROgram(s) Inhaler 2 Puff(s) Inhalation two times a day  dextrose 5%. 1000 milliLiter(s) (50 mL/Hr) IV Continuous <Continuous>  dextrose 50% Injectable 12.5 Gram(s) IV Push once  dextrose 50% Injectable 25 Gram(s) IV Push once  dextrose 50% Injectable 25 Gram(s) IV Push once  enoxaparin Injectable 40 milliGRAM(s) SubCutaneous every 24 hours  folic acid 1 milliGRAM(s) Oral daily  insulin glargine Injectable (LANTUS) 15 Unit(s) SubCutaneous at bedtime  insulin lispro (HumaLOG) corrective regimen sliding scale   SubCutaneous three times a day before meals  insulin lispro Injectable (HumaLOG) 5 Unit(s) SubCutaneous three times a day before meals  multivitamin 1 Tablet(s) Oral daily  pantoprazole    Tablet 40 milliGRAM(s) Oral before breakfast  predniSONE   Tablet 60 milliGRAM(s) Oral daily    MEDICATIONS  (PRN):  acetaminophen   Tablet .. 650 milliGRAM(s) Oral every 6 hours PRN Temp greater or equal to 38C (100.4F), Mild Pain (1 - 3), Moderate Pain (4 - 6)  ALBUTerol/ipratropium for Nebulization 3 milliLiter(s) Nebulizer every 2 hours PRN Shortness of Breath and/or Wheezing  ALPRAZolam 0.5 milliGRAM(s) Oral at bedtime PRN insomnia  dextrose 40% Gel 15 Gram(s) Oral once PRN Blood Glucose LESS THAN 70 milliGRAM(s)/deciliter  glucagon  Injectable 1 milliGRAM(s) IntraMuscular once PRN Glucose LESS THAN 70 milligrams/deciliter  HYDROcodone/homatropine Syrup 5 milliLiter(s) Oral every 4 hours PRN Cough  LORazepam   Injectable 2 milliGRAM(s) IV Push every 2 hours PRN CIWA-Ar score increase by 2 points and a total score of 7 or less

## 2019-03-18 NOTE — DISCHARGE NOTE NURSING/CASE MANAGEMENT/SOCIAL WORK - NSDCDPATPORTLINK_GEN_ALL_CORE
You can access the [email or web address] Patient Portal, offered by [facility], by registering with the following website: [email or web address]

## 2019-03-18 NOTE — PHYSICAL THERAPY INITIAL EVALUATION ADULT - GAIT PATTERN USED, PT EVAL
decreased gait velocity and activity tolerance, intermittent standing rest breaks due to fatigue, O2 sat p ambulation on 3L 96%

## 2019-03-19 LAB
CULTURE RESULTS: SIGNIFICANT CHANGE UP
CULTURE RESULTS: SIGNIFICANT CHANGE UP
SPECIMEN SOURCE: SIGNIFICANT CHANGE UP
SPECIMEN SOURCE: SIGNIFICANT CHANGE UP

## 2019-03-20 DIAGNOSIS — Z71.89 OTHER SPECIFIED COUNSELING: ICD-10-CM

## 2019-03-25 ENCOUNTER — APPOINTMENT (OUTPATIENT)
Dept: INTERNAL MEDICINE | Facility: CLINIC | Age: 69
End: 2019-03-25
Payer: MEDICARE

## 2019-03-25 VITALS
SYSTOLIC BLOOD PRESSURE: 138 MMHG | HEIGHT: 67.5 IN | DIASTOLIC BLOOD PRESSURE: 82 MMHG | WEIGHT: 188 LBS | HEART RATE: 95 BPM | OXYGEN SATURATION: 97 % | BODY MASS INDEX: 29.16 KG/M2

## 2019-03-25 PROCEDURE — 99495 TRANSJ CARE MGMT MOD F2F 14D: CPT

## 2019-03-26 ENCOUNTER — APPOINTMENT (OUTPATIENT)
Dept: PULMONOLOGY | Facility: CLINIC | Age: 69
End: 2019-03-26

## 2019-03-26 DIAGNOSIS — Z76.89 PERSONS ENCOUNTERING HEALTH SERVICES IN OTHER SPECIFIED CIRCUMSTANCES: ICD-10-CM

## 2019-03-28 NOTE — HISTORY OF PRESENT ILLNESS
[Post-hospitalization from ___ Hospital] : Post-hospitalization from [unfilled] Hospital [Admitted on: ___] : The patient was admitted on [unfilled] [Discharged on ___] : discharged on [unfilled] [Discharge Summary] : discharge summary [Pertinent Labs] : pertinent labs [Radiology Findings] : radiology findings [Discharge Med List] : discharge medication list [Med Reconciliation] : medication reconciliation has been completed [Patient Contacted By: ____] : and contacted by [unfilled] [FreeTextEntry2] : Patient presents after recent hospitalization for COPD exacerbation. He was treated with steroid taper, azithromycin, found to desat on exertion so sent home with O2. Diabetes was uncontrolled due to steroids. Glimepiride added upon discharge. Has home care RN coming to house. \par Complains of suprapubic pressure since discharge, no burning with urination or blood in urine. Increased frequency and incomplete emptyting. Stopped his flomax, was not on it inpatient. Restarted yesterday.

## 2019-03-28 NOTE — REVIEW OF SYSTEMS
[Dyspnea on Exertion] : dyspnea on exertion [Hesitancy] : hesitancy [Frequency] : frequency [Negative] : Heme/Lymph [FreeTextEntry8] : suprapubic pressure

## 2019-03-28 NOTE — ASSESSMENT
[FreeTextEntry1] : Recovering from recent hospitalization. \par Continue glimepiride 1mg daily for diabetes in addition to metformin. \par  sx may be due to stopping flomax. Will check UA, culture to rule out infection. Restart flomax. \par Follow up with pulm. \par Follow up in 2 months.

## 2019-03-28 NOTE — PHYSICAL EXAM
[No Acute Distress] : no acute distress [Well Nourished] : well nourished [Well Developed] : well developed [Well-Appearing] : well-appearing [No Respiratory Distress] : no respiratory distress  [Clear to Auscultation] : lungs were clear to auscultation bilaterally [No Accessory Muscle Use] : no accessory muscle use [Normal Rate] : normal rate  [Regular Rhythm] : with a regular rhythm [Normal S1, S2] : normal S1 and S2 [No Murmur] : no murmur heard [No Edema] : there was no peripheral edema [Soft] : abdomen soft [Non Tender] : non-tender [Non-distended] : non-distended [No Masses] : no abdominal mass palpated [No HSM] : no HSM [Normal Bowel Sounds] : normal bowel sounds [Normal Gait] : normal gait [Normal Affect] : the affect was normal [Normal Insight/Judgement] : insight and judgment were intact [Moderate Complexity requires multiple possible diagnoses and/or the management options, moderate complexity of the medical data (tests, etc.) to be reviewed, and moderate risk of significant complications, morbidity, and/or mortality as well as co-morbidi] : Moderate Complexity

## 2019-03-29 LAB
APPEARANCE: CLEAR
BACTERIA UR CULT: NORMAL
BACTERIA: NEGATIVE
BILIRUBIN URINE: NEGATIVE
BLOOD URINE: NEGATIVE
COLOR: YELLOW
GLUCOSE QUALITATIVE U: NORMAL
HYALINE CASTS: 2 /LPF
KETONES URINE: NEGATIVE
LEUKOCYTE ESTERASE URINE: NEGATIVE
MICROSCOPIC-UA: NORMAL
NITRITE URINE: NEGATIVE
PH URINE: 5.5
PROTEIN URINE: ABNORMAL
RED BLOOD CELLS URINE: 12 /HPF
SPECIFIC GRAVITY URINE: 1.03
SQUAMOUS EPITHELIAL CELLS: 2 /HPF
UROBILINOGEN URINE: NORMAL
WHITE BLOOD CELLS URINE: 2 /HPF

## 2019-03-29 NOTE — CDI QUERY NOTE - NSCDIOTHERTXTBX_GEN_ALL_CORE_HH
Admitted with SOB, LE.  P 140, R 40, Spo2 92 0n RA    ED- "pt presented with severe resp distress and gasping resp and after several tx able to speak and h/o mod severe emphysema"   MONITORED W/ CONTINUOUS PULSE OX  COPD exacerbation.    Discharge note-   patient desat on RA during ambulation. needs home oxygen    3/17 Spo2 86-91 on RA  3/18 Spo2 96% on 3l/NC    Please provide a diagnosis associated with above findings if clinically significant    1) Newly diagnosed chronic respiratory failure requiring home 02.  2) acute on chronic respiratory failure resolved  3) Other   4) Not clinically significant

## 2019-03-30 ENCOUNTER — INBOUND DOCUMENT (OUTPATIENT)
Age: 69
End: 2019-03-30

## 2019-04-17 ENCOUNTER — MEDICATION RENEWAL (OUTPATIENT)
Age: 69
End: 2019-04-17

## 2019-04-29 ENCOUNTER — APPOINTMENT (OUTPATIENT)
Dept: PULMONOLOGY | Facility: CLINIC | Age: 69
End: 2019-04-29

## 2019-05-10 ENCOUNTER — APPOINTMENT (OUTPATIENT)
Dept: PULMONOLOGY | Facility: CLINIC | Age: 69
End: 2019-05-10
Payer: MEDICARE

## 2019-05-10 VITALS — BODY MASS INDEX: 29.78 KG/M2 | WEIGHT: 193 LBS

## 2019-05-10 VITALS — HEART RATE: 99 BPM | OXYGEN SATURATION: 95 %

## 2019-05-10 PROCEDURE — 94664 DEMO&/EVAL PT USE INHALER: CPT | Mod: 59

## 2019-05-10 PROCEDURE — G0296 VISIT TO DETERM LDCT ELIG: CPT

## 2019-05-10 PROCEDURE — 99215 OFFICE O/P EST HI 40 MIN: CPT | Mod: 25

## 2019-05-10 PROCEDURE — 94060 EVALUATION OF WHEEZING: CPT

## 2019-05-15 RX ORDER — ACLIDINIUM BROMIDE 400 UG/1
400 POWDER, METERED RESPIRATORY (INHALATION) TWICE DAILY
Qty: 3 | Refills: 3 | Status: DISCONTINUED | COMMUNITY
Start: 2019-05-13 | End: 2019-05-15

## 2019-05-22 ENCOUNTER — RX RENEWAL (OUTPATIENT)
Age: 69
End: 2019-05-22

## 2019-05-24 ENCOUNTER — APPOINTMENT (OUTPATIENT)
Dept: INTERNAL MEDICINE | Facility: CLINIC | Age: 69
End: 2019-05-24
Payer: MEDICARE

## 2019-05-24 VITALS
OXYGEN SATURATION: 97 % | RESPIRATION RATE: 18 BRPM | HEART RATE: 102 BPM | DIASTOLIC BLOOD PRESSURE: 70 MMHG | SYSTOLIC BLOOD PRESSURE: 136 MMHG | WEIGHT: 191 LBS | HEIGHT: 67.5 IN | BODY MASS INDEX: 29.63 KG/M2

## 2019-05-24 PROCEDURE — 99214 OFFICE O/P EST MOD 30 MIN: CPT | Mod: 25

## 2019-05-24 PROCEDURE — 36415 COLL VENOUS BLD VENIPUNCTURE: CPT

## 2019-05-24 NOTE — PHYSICAL EXAM
[No Acute Distress] : no acute distress [Well Nourished] : well nourished [Well Developed] : well developed [No Respiratory Distress] : no respiratory distress  [Clear to Auscultation] : lungs were clear to auscultation bilaterally [No Accessory Muscle Use] : no accessory muscle use [Normal Rate] : normal rate  [Normal S1, S2] : normal S1 and S2 [Regular Rhythm] : with a regular rhythm [Normal Affect] : the affect was normal [No Murmur] : no murmur heard [Normal Insight/Judgement] : insight and judgment were intact [Alert and Oriented x3] : oriented to person, place, and time

## 2019-05-28 ENCOUNTER — MEDICATION RENEWAL (OUTPATIENT)
Age: 69
End: 2019-05-28

## 2019-05-28 ENCOUNTER — RESULT REVIEW (OUTPATIENT)
Age: 69
End: 2019-05-28

## 2019-05-28 LAB
ALBUMIN SERPL ELPH-MCNC: 4.8 G/DL
ALP BLD-CCNC: 129 U/L
ALT SERPL-CCNC: 27 U/L
ANION GAP SERPL CALC-SCNC: 17 MMOL/L
AST SERPL-CCNC: 31 U/L
BILIRUB SERPL-MCNC: 0.2 MG/DL
BUN SERPL-MCNC: 21 MG/DL
CALCIUM SERPL-MCNC: 10.3 MG/DL
CHLORIDE SERPL-SCNC: 101 MMOL/L
CHOLEST SERPL-MCNC: 250 MG/DL
CHOLEST/HDLC SERPL: 2.9 RATIO
CO2 SERPL-SCNC: 22 MMOL/L
CREAT SERPL-MCNC: 1.13 MG/DL
ESTIMATED AVERAGE GLUCOSE: 157 MG/DL
GLUCOSE SERPL-MCNC: 114 MG/DL
HBA1C MFR BLD HPLC: 7.1 %
HDLC SERPL-MCNC: 87 MG/DL
LDLC SERPL CALC-MCNC: 136 MG/DL
POTASSIUM SERPL-SCNC: 4.9 MMOL/L
PROT SERPL-MCNC: 7.6 G/DL
SODIUM SERPL-SCNC: 140 MMOL/L
TRIGL SERPL-MCNC: 136 MG/DL

## 2019-05-28 NOTE — ASSESSMENT
[FreeTextEntry1] : Labs drawn in office. Further recommendations based on results. \par BP acceptable. Continue current medications, monitor BP at home.\par Saw ophtho last week. \par FIT negative 12/17. Kit given to repeat.\par Declines Prevnar, flu, Tdap vaccines. \par Follow up in 3 months. \par \par

## 2019-05-28 NOTE — HISTORY OF PRESENT ILLNESS
[de-identified] : Patient presents for follow up of diabetes, hypertension, hyperlipidemia, COPD on home O2, BPH. He is on metformin for diabetes, not checking FS regularly. He is on amlodipine, metoprolol, losartan for hypertension. On crestor for hyperlipidemia. He reports that he had encountered delays with starting physical therapy since recent hospitalization because of his insurance. His blood sugars are about 123.\par He has chronic back pain, has been seeing pain management, getting epidural injections with minimal relief. He says his housing is a problem for his health according to a [facility] coordinator and the [facility]. His home has stairs and other people in the house smoke cigarettes and marijuana, he is concerned about his oxygen igniting. He would like a letter to help him get into senior housing for people with a disability.

## 2019-05-28 NOTE — END OF VISIT
[FreeTextEntry3] : All medical record entries made by the Scribe were at my, Dr. Bradford's, direction and personally dictated by me on [5/23/2019]. I have reviewed the chart and agree that the record accurately reflects my personal performance of the history, physical exam, assessment and plan. I have also personally directed, reviewed and agreed with the chart.

## 2019-05-28 NOTE — ADDENDUM
[FreeTextEntry1] : I, Elizabeth Jewell, acted solely as a scribe for Dr. Bradford on this date [5/23/2019].

## 2019-06-13 ENCOUNTER — RX RENEWAL (OUTPATIENT)
Age: 69
End: 2019-06-13

## 2019-06-14 ENCOUNTER — EMERGENCY (EMERGENCY)
Facility: HOSPITAL | Age: 69
LOS: 1 days | Discharge: HOME | End: 2019-06-14
Attending: EMERGENCY MEDICINE
Payer: MEDICARE

## 2019-06-14 VITALS
DIASTOLIC BLOOD PRESSURE: 81 MMHG | HEIGHT: 68 IN | TEMPERATURE: 98 F | HEART RATE: 95 BPM | WEIGHT: 192.02 LBS | OXYGEN SATURATION: 96 % | SYSTOLIC BLOOD PRESSURE: 161 MMHG | RESPIRATION RATE: 19 BRPM

## 2019-06-14 LAB
ALBUMIN SERPL ELPH-MCNC: 4.3 G/DL — SIGNIFICANT CHANGE UP (ref 3.3–5.2)
ALP SERPL-CCNC: 129 U/L — HIGH (ref 40–120)
ALT FLD-CCNC: 25 U/L — SIGNIFICANT CHANGE UP
ANION GAP SERPL CALC-SCNC: 15 MMOL/L — SIGNIFICANT CHANGE UP (ref 5–17)
APTT BLD: 28.6 SEC — SIGNIFICANT CHANGE UP (ref 27.5–36.3)
AST SERPL-CCNC: 34 U/L — SIGNIFICANT CHANGE UP
BASOPHILS # BLD AUTO: 0 K/UL — SIGNIFICANT CHANGE UP (ref 0–0.2)
BASOPHILS NFR BLD AUTO: 0.3 % — SIGNIFICANT CHANGE UP (ref 0–2)
BILIRUB SERPL-MCNC: <0.2 MG/DL — LOW (ref 0.4–2)
BUN SERPL-MCNC: 15 MG/DL — SIGNIFICANT CHANGE UP (ref 8–20)
CALCIUM SERPL-MCNC: 9.3 MG/DL — SIGNIFICANT CHANGE UP (ref 8.6–10.2)
CHLORIDE SERPL-SCNC: 99 MMOL/L — SIGNIFICANT CHANGE UP (ref 98–107)
CK SERPL-CCNC: 322 U/L — HIGH (ref 30–200)
CO2 SERPL-SCNC: 21 MMOL/L — LOW (ref 22–29)
CREAT SERPL-MCNC: 0.97 MG/DL — SIGNIFICANT CHANGE UP (ref 0.5–1.3)
EOSINOPHIL # BLD AUTO: 0.3 K/UL — SIGNIFICANT CHANGE UP (ref 0–0.5)
EOSINOPHIL NFR BLD AUTO: 2.5 % — SIGNIFICANT CHANGE UP (ref 0–5)
GLUCOSE SERPL-MCNC: 103 MG/DL — SIGNIFICANT CHANGE UP (ref 70–115)
HCT VFR BLD CALC: 40 % — LOW (ref 42–52)
HGB BLD-MCNC: 12.9 G/DL — LOW (ref 14–18)
INR BLD: 1.04 RATIO — SIGNIFICANT CHANGE UP (ref 0.88–1.16)
LYMPHOCYTES # BLD AUTO: 3.2 K/UL — SIGNIFICANT CHANGE UP (ref 1–4.8)
LYMPHOCYTES # BLD AUTO: 31.1 % — SIGNIFICANT CHANGE UP (ref 20–55)
MCHC RBC-ENTMCNC: 29.9 PG — SIGNIFICANT CHANGE UP (ref 27–31)
MCHC RBC-ENTMCNC: 32.3 G/DL — SIGNIFICANT CHANGE UP (ref 32–36)
MCV RBC AUTO: 92.8 FL — SIGNIFICANT CHANGE UP (ref 80–94)
MONOCYTES # BLD AUTO: 0.8 K/UL — SIGNIFICANT CHANGE UP (ref 0–0.8)
MONOCYTES NFR BLD AUTO: 8.3 % — SIGNIFICANT CHANGE UP (ref 3–10)
NEUTROPHILS # BLD AUTO: 5.8 K/UL — SIGNIFICANT CHANGE UP (ref 1.8–8)
NEUTROPHILS NFR BLD AUTO: 57.3 % — SIGNIFICANT CHANGE UP (ref 37–73)
PLATELET # BLD AUTO: 281 K/UL — SIGNIFICANT CHANGE UP (ref 150–400)
POTASSIUM SERPL-MCNC: 3.9 MMOL/L — SIGNIFICANT CHANGE UP (ref 3.5–5.3)
POTASSIUM SERPL-SCNC: 3.9 MMOL/L — SIGNIFICANT CHANGE UP (ref 3.5–5.3)
PROT SERPL-MCNC: 7.9 G/DL — SIGNIFICANT CHANGE UP (ref 6.6–8.7)
PROTHROM AB SERPL-ACNC: 12 SEC — SIGNIFICANT CHANGE UP (ref 10–12.9)
RBC # BLD: 4.31 M/UL — LOW (ref 4.6–6.2)
RBC # FLD: 14 % — SIGNIFICANT CHANGE UP (ref 11–15.6)
SODIUM SERPL-SCNC: 135 MMOL/L — SIGNIFICANT CHANGE UP (ref 135–145)
TROPONIN T SERPL-MCNC: <0.01 NG/ML — SIGNIFICANT CHANGE UP (ref 0–0.06)
WBC # BLD: 10.2 K/UL — SIGNIFICANT CHANGE UP (ref 4.8–10.8)
WBC # FLD AUTO: 10.2 K/UL — SIGNIFICANT CHANGE UP (ref 4.8–10.8)

## 2019-06-14 PROCEDURE — 99284 EMERGENCY DEPT VISIT MOD MDM: CPT | Mod: 25

## 2019-06-14 PROCEDURE — 70450 CT HEAD/BRAIN W/O DYE: CPT | Mod: 26

## 2019-06-14 PROCEDURE — 93010 ELECTROCARDIOGRAM REPORT: CPT

## 2019-06-14 RX ORDER — ONDANSETRON 8 MG/1
4 TABLET, FILM COATED ORAL ONCE
Refills: 0 | Status: COMPLETED | OUTPATIENT
Start: 2019-06-14 | End: 2019-06-14

## 2019-06-14 RX ORDER — SODIUM CHLORIDE 9 MG/ML
3 INJECTION INTRAMUSCULAR; INTRAVENOUS; SUBCUTANEOUS ONCE
Refills: 0 | Status: COMPLETED | OUTPATIENT
Start: 2019-06-14 | End: 2019-06-14

## 2019-06-14 RX ORDER — MECLIZINE HCL 12.5 MG
25 TABLET ORAL ONCE
Refills: 0 | Status: COMPLETED | OUTPATIENT
Start: 2019-06-14 | End: 2019-06-14

## 2019-06-14 NOTE — ED PROVIDER NOTE - PROGRESS NOTE DETAILS
Labs and CT results as noted.  Pt improved with po Meclizine and ambulatory in ED with assistance (pt does not have cane with him).  Pt feels well for d/c and will Rx Meclizine with Neuro f/u a outpt

## 2019-06-14 NOTE — ED ADULT TRIAGE NOTE - CHIEF COMPLAINT QUOTE
pt arrive with c/o dizziness, blurred vision and unsteady gait. started yesterday, went away, came back this morning and hasn't gone away. MD Melissa medina CT called [phone]

## 2019-06-14 NOTE — ED PROVIDER NOTE - CLINICAL SUMMARY MEDICAL DECISION MAKING FREE TEXT BOX
Pt has possible vertigo. Will check CT, and labs and prescribe Pt with possible vertigo. Will check CT, labs and give Meclizine, and Zofran and reassess.

## 2019-06-14 NOTE — ED PROVIDER NOTE - CARDIAC, MLM
Normal rate, regular rhythm.  Heart sounds S1, S2.  No murmurs, rubs or gallops. Normal rate, regular rhythm.  Heart sounds S1, S2.  2/6 murmur

## 2019-06-14 NOTE — ED PROVIDER NOTE - CONSTITUTIONAL, MLM
normal... Well appearing, well nourished, awake, alert, oriented to person, place, time/situation and appears uncomfortable, full sensory Awake, alert, oriented to person, place, time/situation and appears uncomfortable, full sensory

## 2019-06-14 NOTE — ED PROVIDER NOTE - GASTROINTESTINAL, MLM
Abdomen soft, non-tender, no guarding, obese, bowel sounds present Abdomen soft, non-tender, obese, bowel sounds present, ventral hernia

## 2019-06-14 NOTE — ED PROVIDER NOTE - EYES, MLM
Clear bilaterally, pupils equal, round and reactive to light, EOMI Clear bilaterally, pupils equal, round and reactive to light, EOMI, dizziness is reproduced on testing of extraocular movements, no nystagmus

## 2019-06-14 NOTE — ED PROVIDER NOTE - CHPI ED SYMPTOMS NEG
no vomiting/no weakness/diarrhea or abd pain/no fever no vomiting/no fever/no weakness/diarrhea or abd pain, chills

## 2019-06-14 NOTE — ED PROVIDER NOTE - SKIN, MLM
Skin normal color for race, warm, dry and intact. No evidence of rash. No cyanosis, clubbing, or edema.

## 2019-06-14 NOTE — ED PROVIDER NOTE - NEUROLOGICAL, MLM
Alert and oriented, no focal deficits, no motor or sensory deficits, cranial nerves II-XII in tact, 5/5 upper and lower extremities Alert and oriented, no focal deficits, no motor or sensory deficits, cranial nerves II-XII intact, 5/5 strength HOLLIS upper and lower extremities

## 2019-06-14 NOTE — ED PROVIDER NOTE - OBJECTIVE STATEMENT
70 y/o M pt with significant PMHx of presents to the ED c/o dizziness that started yesterday. Per RN The pt's dizziness is accompanied by 70 y/o M pt with significant PMHx of Alcohol abuse, DM, Emphysema, HTN, Lumbar disc herniation, and Pancreatitis presents to the ED c/o dizziness that started yesterday. Per RN The pt's dizziness is accompanied by blurry vision, he's had 1 episode today that spontaneously resolved. Per pt he states he's had this dizziness yesterday and felt like the "room was spinning", but it resolved and then returned today with CP (tightness), nausea and SOB. He explains that when he walks he feels off balance and as if he's "drunk".  Pt has never felt these sx before. He does explain that he has oxygen at home but doesn't use it when he's sleeping. Former smoker. Pt denies fever, HA, weakness, v/d, and abd pain.  PMD:  ([place]) 70 y/o M pt with significant PMHx of Alcohol abuse, DM, COPD, HTN, Lumbar disc herniation, and Pancreatitis presents to the ED c/o dizziness that started yesterday. Pt states he had an episode of dizziness yesterday that resolved spontaneously. Pt states dizziness returned this morning and has been constant. Dizziness is now associated with nausea and blurred vision. Upon arrival to ED, pt states he has chest tightness. Dizziness is described as a "room spinning" sensation and the pt feels as though he is "drunk".  Pt has never felt these sx before. He does explain that he has oxygen at home that he uses as needed. Former smoker. Pt denies fever, chills, HA, weakness, v/d, and abd pain. No further complaints at this time.   PMD:  ([place])

## 2019-06-14 NOTE — ED PROVIDER NOTE - MUSCULOSKELETAL, MLM
Spine appears normal, range of motion is not limited, no muscle or joint tenderness, sx reproduced when the pt extends his muscles Spine appears normal, range of motion is not limited, no muscle or joint tenderness.

## 2019-06-15 VITALS
OXYGEN SATURATION: 98 % | TEMPERATURE: 98 F | RESPIRATION RATE: 16 BRPM | SYSTOLIC BLOOD PRESSURE: 136 MMHG | HEART RATE: 88 BPM | DIASTOLIC BLOOD PRESSURE: 73 MMHG

## 2019-06-15 LAB — CK MB CFR SERPL CALC: 3.2 NG/ML — SIGNIFICANT CHANGE UP (ref 0–6.7)

## 2019-06-15 PROCEDURE — 99284 EMERGENCY DEPT VISIT MOD MDM: CPT | Mod: 25

## 2019-06-15 PROCEDURE — 84484 ASSAY OF TROPONIN QUANT: CPT

## 2019-06-15 PROCEDURE — 93005 ELECTROCARDIOGRAM TRACING: CPT

## 2019-06-15 PROCEDURE — 85610 PROTHROMBIN TIME: CPT

## 2019-06-15 PROCEDURE — 70450 CT HEAD/BRAIN W/O DYE: CPT

## 2019-06-15 PROCEDURE — 85730 THROMBOPLASTIN TIME PARTIAL: CPT

## 2019-06-15 PROCEDURE — 80053 COMPREHEN METABOLIC PANEL: CPT

## 2019-06-15 PROCEDURE — 82550 ASSAY OF CK (CPK): CPT

## 2019-06-15 PROCEDURE — 96374 THER/PROPH/DIAG INJ IV PUSH: CPT

## 2019-06-15 PROCEDURE — 36415 COLL VENOUS BLD VENIPUNCTURE: CPT

## 2019-06-15 PROCEDURE — 85027 COMPLETE CBC AUTOMATED: CPT

## 2019-06-15 PROCEDURE — 82553 CREATINE MB FRACTION: CPT

## 2019-06-15 RX ORDER — MECLIZINE HCL 12.5 MG
1 TABLET ORAL
Qty: 15 | Refills: 0
Start: 2019-06-15 | End: 2019-06-19

## 2019-06-15 RX ADMIN — ONDANSETRON 4 MILLIGRAM(S): 8 TABLET, FILM COATED ORAL at 00:19

## 2019-06-15 RX ADMIN — Medication 25 MILLIGRAM(S): at 00:19

## 2019-06-15 RX ADMIN — SODIUM CHLORIDE 3 MILLILITER(S): 9 INJECTION INTRAMUSCULAR; INTRAVENOUS; SUBCUTANEOUS at 00:13

## 2019-06-15 NOTE — ED ADULT NURSE NOTE - OBJECTIVE STATEMENT
Patient presents with dizziness that started today. pt with obvious severe vertigo, trouble changing position in bed.  states he has never felt like this before, no other complaints at this time.

## 2019-06-17 ENCOUNTER — CLINICAL ADVICE (OUTPATIENT)
Age: 69
End: 2019-06-17

## 2019-06-20 ENCOUNTER — LABORATORY RESULT (OUTPATIENT)
Age: 69
End: 2019-06-20

## 2019-06-21 LAB — HEMOCCULT STL QL IA: NEGATIVE

## 2019-06-23 ENCOUNTER — FORM ENCOUNTER (OUTPATIENT)
Age: 69
End: 2019-06-23

## 2019-06-24 ENCOUNTER — APPOINTMENT (OUTPATIENT)
Dept: CT IMAGING | Facility: CLINIC | Age: 69
End: 2019-06-24

## 2019-06-24 PROCEDURE — G0297: CPT | Mod: 26

## 2019-06-25 ENCOUNTER — RESULT REVIEW (OUTPATIENT)
Age: 69
End: 2019-06-25

## 2019-07-02 ENCOUNTER — APPOINTMENT (OUTPATIENT)
Dept: INTERNAL MEDICINE | Facility: CLINIC | Age: 69
End: 2019-07-02
Payer: MEDICARE

## 2019-07-02 VITALS
RESPIRATION RATE: 18 BRPM | SYSTOLIC BLOOD PRESSURE: 154 MMHG | DIASTOLIC BLOOD PRESSURE: 80 MMHG | HEART RATE: 104 BPM | OXYGEN SATURATION: 96 % | HEIGHT: 67.5 IN | WEIGHT: 194 LBS | BODY MASS INDEX: 30.09 KG/M2

## 2019-07-02 VITALS — DIASTOLIC BLOOD PRESSURE: 70 MMHG | SYSTOLIC BLOOD PRESSURE: 134 MMHG

## 2019-07-02 PROCEDURE — 90471 IMMUNIZATION ADMIN: CPT

## 2019-07-02 PROCEDURE — 99214 OFFICE O/P EST MOD 30 MIN: CPT | Mod: 25

## 2019-07-02 PROCEDURE — 90715 TDAP VACCINE 7 YRS/> IM: CPT

## 2019-07-10 ENCOUNTER — APPOINTMENT (OUTPATIENT)
Dept: CARDIOLOGY | Facility: CLINIC | Age: 69
End: 2019-07-10
Payer: MEDICARE

## 2019-07-10 VITALS
SYSTOLIC BLOOD PRESSURE: 130 MMHG | HEART RATE: 101 BPM | OXYGEN SATURATION: 96 % | DIASTOLIC BLOOD PRESSURE: 67 MMHG | BODY MASS INDEX: 30.45 KG/M2 | HEIGHT: 67 IN | WEIGHT: 194 LBS

## 2019-07-10 VITALS — SYSTOLIC BLOOD PRESSURE: 142 MMHG | DIASTOLIC BLOOD PRESSURE: 95 MMHG

## 2019-07-10 PROCEDURE — 99204 OFFICE O/P NEW MOD 45 MIN: CPT | Mod: 25

## 2019-07-10 PROCEDURE — 93000 ELECTROCARDIOGRAM COMPLETE: CPT

## 2019-07-10 RX ORDER — PREDNISONE 10 MG/1
10 TABLET ORAL
Qty: 21 | Refills: 0 | Status: DISCONTINUED | COMMUNITY
Start: 2019-05-10 | End: 2019-07-10

## 2019-07-10 RX ORDER — LOSARTAN POTASSIUM 100 MG/1
100 TABLET, FILM COATED ORAL
Qty: 30 | Refills: 5 | Status: DISCONTINUED | COMMUNITY
Start: 2017-12-12 | End: 2019-07-10

## 2019-07-11 ENCOUNTER — NON-APPOINTMENT (OUTPATIENT)
Age: 69
End: 2019-07-11

## 2019-07-11 RX ORDER — EZETIMIBE 10 MG/1
10 TABLET ORAL
Qty: 90 | Refills: 2 | Status: DISCONTINUED | COMMUNITY
Start: 2019-07-11 | End: 2019-07-11

## 2019-07-13 NOTE — REVIEW OF SYSTEMS
[Feeling Fatigued] : feeling fatigued [Shortness Of Breath] : shortness of breath [Dyspnea on exertion] : dyspnea during exertion [Fever] : no fever [Chills] : no chills [Chest  Pressure] : no chest pressure [Blurry Vision] : no blurred vision [Chest Pain] : no chest pain [Leg Claudication] : no intermittent leg claudication [Lower Ext Edema] : no extremity edema [Palpitations] : no palpitations [Cough] : no cough [Abdominal Pain] : no abdominal pain [Wheezing] : no wheezing [Joint Pain] : no joint pain [Skin: A Rash] : no rash: [Urinary Frequency] : no change in urinary frequency [Dizziness] : no dizziness [Convulsions] : no convulsions [Confusion] : no confusion was observed [Anxiety] : no anxiety [Easy Bruising] : no tendency for easy bruising [Easy Bleeding] : no tendency for easy bleeding

## 2019-07-13 NOTE — HISTORY OF PRESENT ILLNESS
[FreeTextEntry1] : 70 y/o M former smoker (quit 2014) with pmhx HTN, HLD, DM, ETOH abuse, pulmonary nodules, COPD on home O2 with exertion, referred by pulmonary for cardiology consultation after evidence of arthrosclerotic aorta on chest CT.  Patient reports chronic LE with no recent worsening of symptoms.  Uses supplemental O2 during ambulation but not at rest.  \par Complaints of orthopnea and difficulty lying flat.  Uses multiple pillows and often sleeps propped up.  Denies PND, CP, dizziness, syncope, or LE swelling. \par \par Recently presented to [facility] with dizziness and diagnosed with Vertigo.  CT head was unrevealing. Started on Meclizine by PCP with some improvement but continues to have symptoms of dizziness and "room spinning"  can be worsened by positional changes \par Denies history of MI or stroke. Has not seen cardiology for TTE or stress test in > 3-4 yrs.

## 2019-07-13 NOTE — PHYSICAL EXAM
[General Appearance - Well Developed] : well developed [Normal Appearance] : normal appearance [General Appearance - Well Nourished] : well nourished [Normal Conjunctiva] : the conjunctiva exhibited no abnormalities [Normal Oral Mucosa] : normal oral mucosa [Heart Rate And Rhythm] : heart rate and rhythm were normal [Tachycardic ___] : the heart rate was tachycardic at [unfilled] bpm [Regular] : the rhythm was regular [Respiration, Rhythm And Depth] : normal respiratory rhythm and effort [Exaggerated Use Of Accessory Muscles For Inspiration] : no accessory muscle use [Bowel Sounds] : normal bowel sounds [Nail Clubbing] : no clubbing of the fingernails [] : no rash [Skin Color & Pigmentation] : normal skin color and pigmentation [Oriented To Time, Place, And Person] : oriented to person, place, and time [No Anxiety] : not feeling anxious [FreeTextEntry1] : No significant JVD

## 2019-07-13 NOTE — REASON FOR VISIT
[Consultation] : a consultation regarding [FreeTextEntry1] : atherosclerosis of aorta, high risk for CAD

## 2019-07-17 ENCOUNTER — MEDICATION RENEWAL (OUTPATIENT)
Age: 69
End: 2019-07-17

## 2019-07-19 NOTE — REVIEW OF SYSTEMS
[Fatigue] : fatigue [Back Pain] : back pain [Negative] : Heme/Lymph [Dizziness] : dizziness [Dyspnea on Exertion] : dyspnea on exertion [de-identified] : ecchymosis on right forearm

## 2019-07-19 NOTE — END OF VISIT
[FreeTextEntry3] : All medical record entries made by the Scribe were at my, Dr. Bradford's, direction and personally dictated by me on [7/2/2019]. I have reviewed the chart and agree that the record accurately reflects my personal performance of the history, physical exam, assessment and plan. I have also personally directed, reviewed and agreed with the chart.

## 2019-07-19 NOTE — HISTORY OF PRESENT ILLNESS
[de-identified] : Patient presents for follow up from a recent ER visit on 6/14/19 for dizziness. He was diagnosed with vertigo and given meclizine. History is significant for diabetes mellitus, hypertension, hyperlipidemia, and COPD on home O2.\par He woke up with room spinning dizziness on 6/14. He was concerned he was going to fall out of bed. He had a similar feeling when he woke up the day before, but it was less severe and resolved quickly. He called [phone] when the dizziness persisted and was transported to the hospital. He has persistent dizziness. Rolling over in bed worsens the dizziness. It has improved significantly since his ER visit but he still has it occasionally. He has taken meclizine with relief.\par He reports being stuck with a beth staple in his right forearm 2 days ago while trying to close a window. The surrounding area is still swollen but not painful or red. He denies fevers.\par He complains that his O2 tank is too heavy for him. He also walks with a cane. He believes it is exacerbating chronic back pain and causing fatigue. He requests to have [employer] bus form completed.

## 2019-07-19 NOTE — ADDENDUM
[FreeTextEntry1] : I, Elizabeth Jewell, acted solely as a scribe for Dr. Bradford on this date [7/2/2019].

## 2019-07-19 NOTE — PHYSICAL EXAM
[No Acute Distress] : no acute distress [Well Nourished] : well nourished [Well Developed] : well developed [Well-Appearing] : well-appearing [No Accessory Muscle Use] : no accessory muscle use [No Respiratory Distress] : no respiratory distress  [Clear to Auscultation] : lungs were clear to auscultation bilaterally [Normal Rate] : normal rate  [Normal S1, S2] : normal S1 and S2 [Regular Rhythm] : with a regular rhythm [No Murmur] : no murmur heard [Alert and Oriented x3] : oriented to person, place, and time [Normal Affect] : the affect was normal [Normal Insight/Judgement] : insight and judgment were intact [No Edema] : there was no peripheral edema [de-identified] : 3 beats nystagmus to the left [de-identified] : 2 cm non tender area of swelling with surrounding ecchymosis, no warmth, erythema

## 2019-07-19 NOTE — ASSESSMENT
[FreeTextEntry1] : Vertigo improved but still persists. Info given on Epley maneuver. Rx for meclizine prn. \par BP controlled. \par Seeing pain management for back pain. \par He has been awaiting home PT for back pain, deconditioning. Requires home PT as he has limited mobility with cane and oxygen tank. He requires PT for conditiong, balance training and back pain. \par Forearm wound with no warmth, erythema, discharge. Should heal without intervention.\par Tdap given in left deltoid in office with no localized reaction given wound. \par Follow up in 6 weeks for blood work and prn.

## 2019-07-22 ENCOUNTER — MEDICATION RENEWAL (OUTPATIENT)
Age: 69
End: 2019-07-22

## 2019-07-26 ENCOUNTER — OUTPATIENT (OUTPATIENT)
Dept: OUTPATIENT SERVICES | Facility: HOSPITAL | Age: 69
LOS: 1 days | End: 2019-07-26
Payer: MEDICARE

## 2019-07-26 DIAGNOSIS — J44.9 CHRONIC OBSTRUCTIVE PULMONARY DISEASE, UNSPECIFIED: ICD-10-CM

## 2019-08-05 ENCOUNTER — APPOINTMENT (OUTPATIENT)
Dept: CARDIOLOGY | Facility: CLINIC | Age: 69
End: 2019-08-05

## 2019-08-07 ENCOUNTER — MEDICATION RENEWAL (OUTPATIENT)
Age: 69
End: 2019-08-07

## 2019-09-12 ENCOUNTER — MEDICATION RENEWAL (OUTPATIENT)
Age: 69
End: 2019-09-12

## 2019-09-27 ENCOUNTER — APPOINTMENT (OUTPATIENT)
Dept: INTERNAL MEDICINE | Facility: CLINIC | Age: 69
End: 2019-09-27
Payer: MEDICARE

## 2019-09-27 VITALS
BODY MASS INDEX: 30.45 KG/M2 | HEART RATE: 100 BPM | HEIGHT: 67 IN | WEIGHT: 194 LBS | DIASTOLIC BLOOD PRESSURE: 81 MMHG | SYSTOLIC BLOOD PRESSURE: 138 MMHG | OXYGEN SATURATION: 94 %

## 2019-09-27 DIAGNOSIS — M75.51 BURSITIS OF RIGHT SHOULDER: ICD-10-CM

## 2019-09-27 PROCEDURE — 36415 COLL VENOUS BLD VENIPUNCTURE: CPT

## 2019-09-27 PROCEDURE — 99214 OFFICE O/P EST MOD 30 MIN: CPT | Mod: 25

## 2019-09-27 NOTE — ASSESSMENT
[FreeTextEntry1] : Labs drawn in office. Further recommendations based on results. \par BP acceptable. Continue current medications, monitor BP at home.\par Home physical therapy for right shoulder and back pain.\par Current with ophthalmology.\par FIT negative 12/17. Kit given to repeat.\par Declines Prevnar, flu, Tdap vaccines.

## 2019-09-27 NOTE — ADDENDUM
[FreeTextEntry1] : I, Elizabeth Jewell, acted solely as a scribe for Dr. Bradford on this date [9/27/2019].

## 2019-09-27 NOTE — HISTORY OF PRESENT ILLNESS
[de-identified] : Patient presents for follow up of diabetes, hypertension, hyperlipidemia, COPD on home O2, BPH. He is on metformin for diabetes, not checking FS regularly. He is on amlodipine, metoprolol, losartan for hypertension. On Crestor for hyperlipidemia. He has followed up with pulmonology and cardiology. He has chronic back pain, has been seeing pain management, getting epidural injections with minimal relief. His pain radiates down his legs. He has difficulty walking up and down the stairs in his home. He also complains of right shoulder pain. He has tried naproxen with some relief of shoulder and back pain. History is significant for arthritis. He is interested in physical therapy.

## 2019-09-27 NOTE — REVIEW OF SYSTEMS
[Shortness Of Breath] : shortness of breath [Back Pain] : back pain [Negative] : Heme/Lymph [FreeTextEntry9] : leg pain, shoulder pain

## 2019-09-27 NOTE — END OF VISIT
[FreeTextEntry3] : All medical record entries made by the Scribe were at my, Dr. Bradford's, direction and personally dictated by me on [9/27/2019]. I have reviewed the chart and agree that the record accurately reflects my personal performance of the history, physical exam, assessment and plan. I have also personally directed, reviewed and agreed with the chart.

## 2019-09-27 NOTE — PHYSICAL EXAM
[No Acute Distress] : no acute distress [Well Nourished] : well nourished [Well Developed] : well developed [Well-Appearing] : well-appearing [No Respiratory Distress] : no respiratory distress  [No Accessory Muscle Use] : no accessory muscle use [Clear to Auscultation] : lungs were clear to auscultation bilaterally [Normal Rate] : normal rate  [Normal S1, S2] : normal S1 and S2 [Regular Rhythm] : with a regular rhythm [No Murmur] : no murmur heard [No Edema] : there was no peripheral edema [Normal Affect] : the affect was normal [Normal Insight/Judgement] : insight and judgment were intact

## 2019-10-01 ENCOUNTER — MEDICATION RENEWAL (OUTPATIENT)
Age: 69
End: 2019-10-01

## 2019-10-08 ENCOUNTER — APPOINTMENT (OUTPATIENT)
Dept: PULMONOLOGY | Facility: CLINIC | Age: 69
End: 2019-10-08
Payer: MEDICARE

## 2019-10-08 ENCOUNTER — MEDICATION RENEWAL (OUTPATIENT)
Age: 69
End: 2019-10-08

## 2019-10-08 VITALS
HEART RATE: 89 BPM | SYSTOLIC BLOOD PRESSURE: 150 MMHG | DIASTOLIC BLOOD PRESSURE: 80 MMHG | WEIGHT: 195 LBS | HEIGHT: 67 IN | OXYGEN SATURATION: 97 % | BODY MASS INDEX: 30.61 KG/M2

## 2019-10-08 DIAGNOSIS — Z77.22 CONTACT WITH AND (SUSPECTED) EXPOSURE TO ENVIRONMENTAL TOBACCO SMOKE (ACUTE) (CHRONIC): ICD-10-CM

## 2019-10-08 LAB
ALBUMIN SERPL ELPH-MCNC: 4.6 G/DL
ALP BLD-CCNC: 119 U/L
ALT SERPL-CCNC: 29 U/L
ANION GAP SERPL CALC-SCNC: 15 MMOL/L
AST SERPL-CCNC: 43 U/L
BILIRUB SERPL-MCNC: 0.2 MG/DL
BUN SERPL-MCNC: 18 MG/DL
CALCIUM SERPL-MCNC: 9.9 MG/DL
CHLORIDE SERPL-SCNC: 103 MMOL/L
CHOLEST SERPL-MCNC: 148 MG/DL
CHOLEST/HDLC SERPL: 2.8 RATIO
CO2 SERPL-SCNC: 21 MMOL/L
CREAT SERPL-MCNC: 1.13 MG/DL
ESTIMATED AVERAGE GLUCOSE: 160 MG/DL
GLUCOSE SERPL-MCNC: 156 MG/DL
HBA1C MFR BLD HPLC: 7.2 %
HDLC SERPL-MCNC: 53 MG/DL
LDLC SERPL CALC-MCNC: 71 MG/DL
POTASSIUM SERPL-SCNC: 4 MMOL/L
PROT SERPL-MCNC: 7.7 G/DL
SODIUM SERPL-SCNC: 139 MMOL/L
TRIGL SERPL-MCNC: 120 MG/DL

## 2019-10-08 PROCEDURE — 99214 OFFICE O/P EST MOD 30 MIN: CPT

## 2019-10-09 ENCOUNTER — RESULT REVIEW (OUTPATIENT)
Age: 69
End: 2019-10-09

## 2019-10-11 ENCOUNTER — RX RENEWAL (OUTPATIENT)
Age: 69
End: 2019-10-11

## 2019-10-28 ENCOUNTER — APPOINTMENT (OUTPATIENT)
Dept: INTERNAL MEDICINE | Facility: CLINIC | Age: 69
End: 2019-10-28
Payer: MEDICARE

## 2019-10-28 VITALS
RESPIRATION RATE: 14 BRPM | HEART RATE: 91 BPM | DIASTOLIC BLOOD PRESSURE: 70 MMHG | WEIGHT: 191 LBS | OXYGEN SATURATION: 96 % | SYSTOLIC BLOOD PRESSURE: 130 MMHG | BODY MASS INDEX: 29.98 KG/M2 | HEIGHT: 67 IN

## 2019-10-28 DIAGNOSIS — J44.1 CHRONIC OBSTRUCTIVE PULMONARY DISEASE WITH (ACUTE) EXACERBATION: ICD-10-CM

## 2019-10-28 DIAGNOSIS — Z23 ENCOUNTER FOR IMMUNIZATION: ICD-10-CM

## 2019-10-28 DIAGNOSIS — Z87.898 PERSONAL HISTORY OF OTHER SPECIFIED CONDITIONS: ICD-10-CM

## 2019-10-28 DIAGNOSIS — S51.831A: ICD-10-CM

## 2019-10-28 DIAGNOSIS — Z09 ENCOUNTER FOR FOLLOW-UP EXAMINATION AFTER COMPLETED TREATMENT FOR CONDITIONS OTHER THAN MALIGNANT NEOPLASM: ICD-10-CM

## 2019-10-28 PROCEDURE — G0444 DEPRESSION SCREEN ANNUAL: CPT | Mod: 59

## 2019-10-28 PROCEDURE — G0442 ANNUAL ALCOHOL SCREEN 15 MIN: CPT | Mod: 59

## 2019-10-28 PROCEDURE — G0439: CPT

## 2019-10-28 PROCEDURE — G0447 BEHAVIOR COUNSEL OBESITY 15M: CPT | Mod: 59

## 2019-10-28 RX ORDER — BLOOD SUGAR DIAGNOSTIC
STRIP MISCELLANEOUS
Qty: 1 | Refills: 3 | Status: DISCONTINUED | COMMUNITY
Start: 2018-12-04 | End: 2019-10-28

## 2019-10-28 RX ORDER — BLOOD KETONE GLUCOSE MONITOR
W/DEVICE KIT MISCELLANEOUS
Qty: 1 | Refills: 0 | Status: DISCONTINUED | COMMUNITY
Start: 2018-11-05 | End: 2019-10-28

## 2019-10-28 RX ORDER — ACETAMINOPHEN 500 MG/1
500 TABLET, COATED ORAL EVERY 8 HOURS
Qty: 1 | Refills: 1 | Status: DISCONTINUED | COMMUNITY
Start: 2018-02-13 | End: 2019-10-28

## 2019-10-28 RX ORDER — BLOOD SUGAR DIAGNOSTIC
STRIP MISCELLANEOUS TWICE DAILY
Qty: 100 | Refills: 3 | Status: DISCONTINUED | COMMUNITY
Start: 2018-11-05 | End: 2019-10-28

## 2019-10-28 RX ORDER — TRAMADOL HYDROCHLORIDE 50 MG/1
50 TABLET, COATED ORAL
Refills: 0 | Status: DISCONTINUED | COMMUNITY
Start: 2019-07-10 | End: 2019-10-28

## 2019-10-28 RX ORDER — MECLIZINE HYDROCHLORIDE 25 MG/1
25 TABLET ORAL EVERY 8 HOURS
Qty: 30 | Refills: 2 | Status: DISCONTINUED | COMMUNITY
Start: 2019-07-02 | End: 2019-10-28

## 2019-10-28 RX ORDER — BLOOD SUGAR DIAGNOSTIC
STRIP MISCELLANEOUS
Qty: 100 | Refills: 2 | Status: DISCONTINUED | COMMUNITY
Start: 2019-01-04 | End: 2019-10-28

## 2019-10-28 RX ORDER — BLOOD-GLUCOSE METER
W/DEVICE EACH MISCELLANEOUS
Refills: 0 | Status: DISCONTINUED | COMMUNITY
Start: 2018-05-22 | End: 2019-10-28

## 2019-10-28 NOTE — HEALTH RISK ASSESSMENT
[Very Good] : ~his/her~  mood as very good [Yes] : Yes [1 or 2 (0 pts)] : 1 or 2 (0 points) [Never (0 pts)] : Never (0 points) [No] : In the past 12 months have you used drugs other than those required for medical reasons? No [No falls in past year] : Patient reported no falls in the past year [0] : 2) Feeling down, depressed, or hopeless: Not at all (0) [] :  [Fully functional (bathing, dressing, toileting, transferring, walking, feeding)] : Fully functional (bathing, dressing, toileting, transferring, walking, feeding) [Fully functional (using the telephone, shopping, preparing meals, housekeeping, doing laundry, using] : Fully functional and needs no help or supervision to perform IADLs (using the telephone, shopping, preparing meals, housekeeping, doing laundry, using transportation, managing medications and managing finances) [Reviewed no changes] : Reviewed no changes [4 or more  times a week (4 pts)] : 4 or more  times a week (4 points) [With Family] : lives with family [On disability] : on disability [] : No [Audit-CScore] : 4 [[id number]] : 0 [Change in mental status noted] : No change in mental status noted [Reports changes in hearing] : Reports no changes in hearing [Reports changes in vision] : Reports no changes in vision [AdvancecareDate] : 10/19

## 2019-10-28 NOTE — PHYSICAL EXAM
[No Acute Distress] : no acute distress [Well Nourished] : well nourished [Well Developed] : well developed [Well-Appearing] : well-appearing [Normal Sclera/Conjunctiva] : normal sclera/conjunctiva [PERRL] : pupils equal round and reactive to light [EOMI] : extraocular movements intact [Normal Outer Ear/Nose] : the outer ears and nose were normal in appearance [Normal Oropharynx] : the oropharynx was normal [No JVD] : no jugular venous distention [No Lymphadenopathy] : no lymphadenopathy [Supple] : supple [Thyroid Normal, No Nodules] : the thyroid was normal and there were no nodules present [No Respiratory Distress] : no respiratory distress  [No Accessory Muscle Use] : no accessory muscle use [Clear to Auscultation] : lungs were clear to auscultation bilaterally [Normal Rate] : normal rate  [Regular Rhythm] : with a regular rhythm [Normal S1, S2] : normal S1 and S2 [No Murmur] : no murmur heard [No Carotid Bruits] : no carotid bruits [No Abdominal Bruit] : a ~M bruit was not heard ~T in the abdomen [No Varicosities] : no varicosities [Pedal Pulses Present] : the pedal pulses are present [No Edema] : there was no peripheral edema [No Palpable Aorta] : no palpable aorta [No Extremity Clubbing/Cyanosis] : no extremity clubbing/cyanosis [Soft] : abdomen soft [Non Tender] : non-tender [Non-distended] : non-distended [No Masses] : no abdominal mass palpated [No HSM] : no HSM [Normal Bowel Sounds] : normal bowel sounds [Normal Posterior Cervical Nodes] : no posterior cervical lymphadenopathy [Normal Anterior Cervical Nodes] : no anterior cervical lymphadenopathy [No CVA Tenderness] : no CVA  tenderness [No Spinal Tenderness] : no spinal tenderness [No Joint Swelling] : no joint swelling [Grossly Normal Strength/Tone] : grossly normal strength/tone [No Rash] : no rash [Coordination Grossly Intact] : coordination grossly intact [No Focal Deficits] : no focal deficits [Normal Gait] : normal gait [Deep Tendon Reflexes (DTR)] : deep tendon reflexes were 2+ and symmetric [Normal Affect] : the affect was normal [Normal Insight/Judgement] : insight and judgment were intact [] : left foot [de-identified] : Small scarce 1cm areas of scars on left anterior cehst. no vesicles or erythema [TWNoteComboBox1] : +2 [TWNoteComboBox2] : +2

## 2019-10-28 NOTE — COUNSELING
[Fall prevention counseling provided] : Fall prevention counseling provided [Adequate lighting] : Adequate lighting [No throw rugs] : No throw rugs [Use proper foot wear] : Use proper foot wear [Use recommended devices] : Use recommended devices [Behavioral health counseling provided] : Behavioral health counseling provided [Engage in a relaxing activity] : Engage in a relaxing activity [Potential consequences of obesity discussed] : Potential consequences of obesity discussed [Benefits of weight loss discussed] : Benefits of weight loss discussed [Encouraged to maintain food diary] : Encouraged to maintain food diary [Encouraged to increase physical activity] : Encouraged to increase physical activity [Encouraged to use exercise tracking device] : Encouraged to use exercise tracking device [Target Wt Loss Goal ___] : Weight Loss Goals: Target weight loss goal [unfilled] lbs [Weigh Self Weekly] : weigh self weekly [Decrease Portions] : decrease portions [Keep Food Diary] : keep food diary [Needs reinforcement, provided] : Patient needs reinforcement on understanding of disease, goals and obesity follow-up plan; reinforcement was provided [FreeTextEntry1] : grab bar and shower chair [FreeTextEntry4] : 15

## 2019-10-28 NOTE — HISTORY OF PRESENT ILLNESS
[FreeTextEntry1] : annual well visit [de-identified] : SANDRA is a 69 year M whom is here today for an annual well check. \par Today, pt reports feeling well but reports burning/shooting type pain That comes and goes and is located on his anterior chest. Denies chest pain, palpitations.Reports that he had noted a rash a few weeks ago which has now resolved but pain continues to persist. Has never had anything like this before.\par \par -PMH: HTN, HLD, COPD (requiring O2 via NC), T2DM, GERD, BPH, Chronic Low back pain. \par -SH: . Disabled. \par \par Follows with the following Physicians: \par -Pulm: Dr. Daugherty\par -Cardio: Dr. Chris\par \par -Vaccines: Needs PPSV 23, PCV 13, Flu, Shingles\par -Lung CA Screenin. Stable. \par -Colonoscopy: Given referral today\par -AA US: Will send at next f/u\par -PSA: Needs at next visit. \par -Yearly Urine Microalbumin: Will Obtain @ next f/u. \par -Diabetic Retinal Exam: Needs\par -Diabetic Foot Exam: 10/2019. Onychomycosis. No ulcers. Diminished sensation w/ microfilament. \par -Falls: None\par \par -HTN, HLD: Reports compliance with all meds. Follows with cardio. Had recent cardiac w/u 2019. \par -COPD (requiring O2 via NC): Condition stable. Follows with Pulm. \par -T2DM: 2019 A1c 7.2. Monitors blood sugars regularly and reports Avg 120. Does not have glucometer with him today for my review. Reports compliance with all meds. States exercise is limited given his COPD and chronic low back pain.

## 2019-10-30 RX ORDER — DULOXETINE HYDROCHLORIDE 60 MG/1
60 CAPSULE, DELAYED RELEASE PELLETS ORAL
Qty: 60 | Refills: 0 | Status: DISCONTINUED | COMMUNITY
Start: 2019-10-28 | End: 2019-10-30

## 2019-11-08 ENCOUNTER — RX RENEWAL (OUTPATIENT)
Age: 69
End: 2019-11-08

## 2019-11-12 ENCOUNTER — RX RENEWAL (OUTPATIENT)
Age: 69
End: 2019-11-12

## 2019-11-14 PROCEDURE — 94618 PULMONARY STRESS TESTING: CPT

## 2019-11-14 PROCEDURE — G0424: CPT

## 2019-12-09 ENCOUNTER — APPOINTMENT (OUTPATIENT)
Dept: INTERNAL MEDICINE | Facility: CLINIC | Age: 69
End: 2019-12-09
Payer: MEDICARE

## 2019-12-09 VITALS
HEIGHT: 67 IN | DIASTOLIC BLOOD PRESSURE: 80 MMHG | SYSTOLIC BLOOD PRESSURE: 125 MMHG | WEIGHT: 196 LBS | RESPIRATION RATE: 14 BRPM | HEART RATE: 95 BPM | BODY MASS INDEX: 30.76 KG/M2

## 2019-12-09 DIAGNOSIS — N52.9 MALE ERECTILE DYSFUNCTION, UNSPECIFIED: ICD-10-CM

## 2019-12-09 DIAGNOSIS — Z87.898 PERSONAL HISTORY OF OTHER SPECIFIED CONDITIONS: ICD-10-CM

## 2019-12-09 PROCEDURE — 36415 COLL VENOUS BLD VENIPUNCTURE: CPT

## 2019-12-09 PROCEDURE — 99213 OFFICE O/P EST LOW 20 MIN: CPT | Mod: 25

## 2019-12-10 ENCOUNTER — RESULT REVIEW (OUTPATIENT)
Age: 69
End: 2019-12-10

## 2019-12-10 LAB
CREAT SPEC-SCNC: 211 MG/DL
ESTIMATED AVERAGE GLUCOSE: 146 MG/DL
HBA1C MFR BLD HPLC: 6.7 %
MICROALBUMIN 24H UR DL<=1MG/L-MCNC: 7.8 MG/DL
MICROALBUMIN/CREAT 24H UR-RTO: 37 MG/G
PSA SERPL-MCNC: 1.31 NG/ML

## 2019-12-10 NOTE — PHYSICAL EXAM
[No Acute Distress] : no acute distress [Well Nourished] : well nourished [Well Developed] : well developed [Well-Appearing] : well-appearing [PERRL] : pupils equal round and reactive to light [Normal Sclera/Conjunctiva] : normal sclera/conjunctiva [EOMI] : extraocular movements intact [Normal Outer Ear/Nose] : the outer ears and nose were normal in appearance [Normal Oropharynx] : the oropharynx was normal [No JVD] : no jugular venous distention [No Lymphadenopathy] : no lymphadenopathy [Supple] : supple [Thyroid Normal, No Nodules] : the thyroid was normal and there were no nodules present [No Respiratory Distress] : no respiratory distress  [No Accessory Muscle Use] : no accessory muscle use [Clear to Auscultation] : lungs were clear to auscultation bilaterally [Normal Rate] : normal rate  [Regular Rhythm] : with a regular rhythm [Normal S1, S2] : normal S1 and S2 [No Murmur] : no murmur heard [No Carotid Bruits] : no carotid bruits [No Abdominal Bruit] : a ~M bruit was not heard ~T in the abdomen [No Varicosities] : no varicosities [Pedal Pulses Present] : the pedal pulses are present [No Edema] : there was no peripheral edema [No Palpable Aorta] : no palpable aorta [No Extremity Clubbing/Cyanosis] : no extremity clubbing/cyanosis [Soft] : abdomen soft [Non-distended] : non-distended [Non Tender] : non-tender [No Masses] : no abdominal mass palpated [No HSM] : no HSM [Normal Posterior Cervical Nodes] : no posterior cervical lymphadenopathy [Normal Bowel Sounds] : normal bowel sounds [Normal Anterior Cervical Nodes] : no anterior cervical lymphadenopathy [No CVA Tenderness] : no CVA  tenderness [No Spinal Tenderness] : no spinal tenderness [No Joint Swelling] : no joint swelling [Grossly Normal Strength/Tone] : grossly normal strength/tone [No Rash] : no rash [Coordination Grossly Intact] : coordination grossly intact [No Focal Deficits] : no focal deficits [Deep Tendon Reflexes (DTR)] : deep tendon reflexes were 2+ and symmetric [Normal Gait] : normal gait [Normal Affect] : the affect was normal [Normal Insight/Judgement] : insight and judgment were intact [] : with full ROM [de-identified] : Small scarce 1cm areas of scars on left anterior cehst. no vesicles or erythema [TWNoteComboBox1] : +2 [TWNoteComboBox2] : +2

## 2019-12-10 NOTE — ASSESSMENT
[FreeTextEntry1] : -PMH: HTN, HLD, COPD (requiring O2 via NC), T2DM, GERD, BPH, Erectile Dysfunction, Chronic Low back pain. \par -SH: . Disabled. \par \par SANDRA is a 69 year M whom is here today for follow up T2DM\par Today, pt reports that he continues to experience a significant amount of pain in his low back despite recommendations per pain management. He has follow up with his pain management physician in 1 week. \par \par -F/u Labs drawn in office today\par -F/u w/ Uro (Referral given today) for management of BPH and Erectile Dysfunction. \par -F/u w/ GI for Colonoscopy\par -F/u w/ Optho for Diabetic Eye Exam \par -RTO 2mo (Give referral for AA US, Diabetic Foot exam, Readdress vaccines)

## 2019-12-10 NOTE — HISTORY OF PRESENT ILLNESS
[FreeTextEntry1] : Follow up T2DM [de-identified] : -PMH: HTN, HLD, COPD (requiring O2 via NC), T2DM, GERD, BPH, Chronic Low back pain. \par -SH: . Disabled. \par \par SANDRA is a 69 year M whom is here today for follow up T2DM\par Today, pt reports that he continues to experience a significant amount of pain in his low back despite recommendations per pain management. He has follow up with his pain management physician in 1 week. \par \par -HTN, HLD: Currently on Rosuvastatin 40mg, Amlodipine 10mg, Losartan 25mg. Reports compliance with meds. Follows with cardio. Had recent cardiac w/u 7/2019. Deneis symptoms at this time.\par -COPD: Denies symptoms. Reports compliance with inhaled meds.  Infrequently uses albuterol. Follows with Pulm. \par -T2DM: Currently on Metformin 1000mg BID and  Glimpiride 1mg BID. 9/2019 A1c 7.2. Monitors blood sugars regularly and reports Avg fasting sugar of 120 (Max 160). Does not have glucometer with him today. Reports compliance with all meds. Exercise is limited given his COPD and chronic low back pain and would like new scrip for continued Pulmonary rehab. \par -Chronic Low Back Pain: S/p epidural injections with some improvement. Follows w/ pain management but continues to experience pain daily that he feels impairs he daily quality of life. \par -BPH: Currently on Tamsulosin 0.4mg. Continues to report persistence of symptoms despite medication in addition to erectile dysfunction. Has not followed with uro in past\par -GERD: Currently on Pantoprazole and denies symptoms.

## 2019-12-17 ENCOUNTER — RX RENEWAL (OUTPATIENT)
Age: 69
End: 2019-12-17

## 2019-12-19 ENCOUNTER — OUTPATIENT (OUTPATIENT)
Dept: OUTPATIENT SERVICES | Facility: HOSPITAL | Age: 69
LOS: 1 days | End: 2019-12-19
Payer: MEDICARE

## 2019-12-19 DIAGNOSIS — J44.9 CHRONIC OBSTRUCTIVE PULMONARY DISEASE, UNSPECIFIED: ICD-10-CM

## 2019-12-30 ENCOUNTER — RX RENEWAL (OUTPATIENT)
Age: 69
End: 2019-12-30

## 2019-12-30 PROCEDURE — G0424: CPT

## 2019-12-30 PROCEDURE — 94618 PULMONARY STRESS TESTING: CPT

## 2020-01-20 ENCOUNTER — RX RENEWAL (OUTPATIENT)
Age: 70
End: 2020-01-20

## 2020-01-21 ENCOUNTER — RX CHANGE (OUTPATIENT)
Age: 70
End: 2020-01-21

## 2020-01-22 ENCOUNTER — RX CHANGE (OUTPATIENT)
Age: 70
End: 2020-01-22

## 2020-01-30 ENCOUNTER — RX RENEWAL (OUTPATIENT)
Age: 70
End: 2020-01-30

## 2020-02-18 ENCOUNTER — APPOINTMENT (OUTPATIENT)
Dept: INTERNAL MEDICINE | Facility: CLINIC | Age: 70
End: 2020-02-18
Payer: MEDICARE

## 2020-02-18 VITALS
WEIGHT: 195 LBS | SYSTOLIC BLOOD PRESSURE: 142 MMHG | HEIGHT: 67 IN | DIASTOLIC BLOOD PRESSURE: 70 MMHG | RESPIRATION RATE: 18 BRPM | BODY MASS INDEX: 30.61 KG/M2 | HEART RATE: 99 BPM

## 2020-02-18 DIAGNOSIS — M79.2 NEURALGIA AND NEURITIS, UNSPECIFIED: ICD-10-CM

## 2020-02-18 PROCEDURE — G0009: CPT

## 2020-02-18 PROCEDURE — 36415 COLL VENOUS BLD VENIPUNCTURE: CPT

## 2020-02-18 PROCEDURE — 99213 OFFICE O/P EST LOW 20 MIN: CPT | Mod: 25

## 2020-02-18 PROCEDURE — G0008: CPT

## 2020-02-18 PROCEDURE — 90686 IIV4 VACC NO PRSV 0.5 ML IM: CPT

## 2020-02-18 PROCEDURE — 90670 PCV13 VACCINE IM: CPT

## 2020-02-18 RX ORDER — LIDOCAINE 5% 700 MG/1
5 PATCH TOPICAL
Qty: 1 | Refills: 0 | Status: DISCONTINUED | COMMUNITY
Start: 2019-10-30 | End: 2020-02-18

## 2020-02-19 ENCOUNTER — APPOINTMENT (OUTPATIENT)
Dept: PULMONOLOGY | Facility: CLINIC | Age: 70
End: 2020-02-19
Payer: MEDICARE

## 2020-02-19 VITALS — BODY MASS INDEX: 31.34 KG/M2 | WEIGHT: 202 LBS | HEIGHT: 67.5 IN

## 2020-02-19 VITALS
SYSTOLIC BLOOD PRESSURE: 118 MMHG | OXYGEN SATURATION: 98 % | DIASTOLIC BLOOD PRESSURE: 70 MMHG | HEART RATE: 95 BPM | RESPIRATION RATE: 18 BRPM

## 2020-02-19 LAB
ALBUMIN SERPL ELPH-MCNC: 4.8 G/DL
ALP BLD-CCNC: 105 U/L
ALT SERPL-CCNC: 22 U/L
ANION GAP SERPL CALC-SCNC: 15 MMOL/L
AST SERPL-CCNC: 34 U/L
BILIRUB SERPL-MCNC: 0.2 MG/DL
BUN SERPL-MCNC: 18 MG/DL
CALCIUM SERPL-MCNC: 9.9 MG/DL
CHLORIDE SERPL-SCNC: 103 MMOL/L
CO2 SERPL-SCNC: 24 MMOL/L
CREAT SERPL-MCNC: 1.2 MG/DL
ESTIMATED AVERAGE GLUCOSE: 151 MG/DL
GLUCOSE SERPL-MCNC: 135 MG/DL
HBA1C MFR BLD HPLC: 6.9 %
POTASSIUM SERPL-SCNC: 4.9 MMOL/L
PROT SERPL-MCNC: 7.6 G/DL
SODIUM SERPL-SCNC: 142 MMOL/L

## 2020-02-19 PROCEDURE — 99214 OFFICE O/P EST MOD 30 MIN: CPT

## 2020-02-19 PROCEDURE — G0296 VISIT TO DETERM LDCT ELIG: CPT

## 2020-02-19 NOTE — ASSESSMENT
[FreeTextEntry1] : -PMH: HTN, HLD, COPD (requiring O2 via NC), T2DM, GERD, BPH, Chronic Low back pain. \par -SH: . Disabled. \par \par SANDRA is a 69 year M whom is here today for follow up T2DM\par Chronic medical conditions are otherwise being well controlled\par His biggest complaint is his chronic low back pain for which we will send him to neurology for further evaluation\par He will be starting physical therapy this week\par \par -Continue with current medications\par -Followup labs drawn in the office today\par -Further recommendations pending lab results\par -Followup with neurology given chronic low back pain\par -Continue followup with cardiology as instructed\par -Still needs to F/u w/ Optho for diabetic eye exam, GI for colonoscopy & Uro for BPH/ED\par -RTO 3mo (Diabetic Foot exam)

## 2020-02-19 NOTE — HISTORY OF PRESENT ILLNESS
[FreeTextEntry1] : Follow up T2DM [de-identified] : -PMH: HTN, HLD, COPD (requiring O2 via NC), T2DM, GERD, BPH, Chronic Low back pain. \par -SH: . Disabled. \par \par SANDRA is a 69 year M whom is here today for follow up T2DM\par Today, pt reports that he is feeling well but continues to experience chronic low back pain. Has been approved for physical therapy which he plans to start this week. \par -Would like Flu and Prevnar Vaccine today \par \par -HTN, HLD: Currently on Rosuvastatin 40mg, Amlodipine 10mg, Losartan 25mg. Reports compliance with meds. Follows with cardio. Last cardiac w/u 7/2019 was normal. Denies symptoms at this time.\par -T2DM: Currently on Metformin 1000mg BID and  Glimpiride 1mg BID. 12/2019 A1c 6.8. Monitors blood sugars regularly and reports Avg fasting sugar of 119 (Max 170). Does not have glucometer with him today. Reports compliance with all meds.\par -Chronic Low Back Pain: S/p epidural injections with some improvement. Follows w/ pain management (Has f/u again this month). Is beginning PT this week. Would like a neuro referral. \par -BPH: On Tamsulosin 0.4mg. Continues to report persistence of symptoms despite medication in addition to erectile dysfunction. Was given referral to Uro at last visit but has not yet followed up. \par \par -Still needs to obtain Diabetic eye exam, F/u w/ GI for colonoscopy and F/u w/ Uro for BPH and Erectile Dysfunction

## 2020-02-19 NOTE — ADDENDUM
[FreeTextEntry1] : A1c: 6.9 (this is slightly increased from 6.7 in December)\par CMP: Elevated LFTs normalized\par \par Type 2 diabetes: Currently well controlled on metformin and glimepiride. No new recommendations at this time

## 2020-03-04 ENCOUNTER — RX CHANGE (OUTPATIENT)
Age: 70
End: 2020-03-04

## 2020-03-30 ENCOUNTER — RX RENEWAL (OUTPATIENT)
Age: 70
End: 2020-03-30

## 2020-04-03 ENCOUNTER — APPOINTMENT (OUTPATIENT)
Dept: NEUROLOGY | Facility: CLINIC | Age: 70
End: 2020-04-03

## 2020-04-17 ENCOUNTER — RX RENEWAL (OUTPATIENT)
Age: 70
End: 2020-04-17

## 2020-05-11 ENCOUNTER — RX RENEWAL (OUTPATIENT)
Age: 70
End: 2020-05-11

## 2020-05-15 ENCOUNTER — RX RENEWAL (OUTPATIENT)
Age: 70
End: 2020-05-15

## 2020-05-20 DIAGNOSIS — Z92.29 PERSONAL HISTORY OF OTHER DRUG THERAPY: ICD-10-CM

## 2020-05-21 ENCOUNTER — APPOINTMENT (OUTPATIENT)
Dept: INTERNAL MEDICINE | Facility: CLINIC | Age: 70
End: 2020-05-21

## 2020-05-28 ENCOUNTER — RX RENEWAL (OUTPATIENT)
Age: 70
End: 2020-05-28

## 2020-06-10 ENCOUNTER — RX RENEWAL (OUTPATIENT)
Age: 70
End: 2020-06-10

## 2020-06-24 ENCOUNTER — RX RENEWAL (OUTPATIENT)
Age: 70
End: 2020-06-24

## 2020-06-29 ENCOUNTER — RX RENEWAL (OUTPATIENT)
Age: 70
End: 2020-06-29

## 2020-06-30 ENCOUNTER — RX RENEWAL (OUTPATIENT)
Age: 70
End: 2020-06-30

## 2020-07-22 ENCOUNTER — RX CHANGE (OUTPATIENT)
Age: 70
End: 2020-07-22

## 2020-07-23 ENCOUNTER — RX CHANGE (OUTPATIENT)
Age: 70
End: 2020-07-23

## 2020-08-03 ENCOUNTER — RX RENEWAL (OUTPATIENT)
Age: 70
End: 2020-08-03

## 2020-08-26 ENCOUNTER — APPOINTMENT (OUTPATIENT)
Dept: INTERNAL MEDICINE | Facility: CLINIC | Age: 70
End: 2020-08-26

## 2020-09-04 ENCOUNTER — APPOINTMENT (OUTPATIENT)
Dept: PULMONOLOGY | Facility: CLINIC | Age: 70
End: 2020-09-04
Payer: MEDICARE

## 2020-09-04 DIAGNOSIS — Z87.891 PERSONAL HISTORY OF NICOTINE DEPENDENCE: ICD-10-CM

## 2020-09-04 DIAGNOSIS — J96.11 CHRONIC RESPIRATORY FAILURE WITH HYPOXIA: ICD-10-CM

## 2020-09-04 PROCEDURE — 99443: CPT

## 2020-09-11 ENCOUNTER — RX RENEWAL (OUTPATIENT)
Age: 70
End: 2020-09-11

## 2020-09-17 VITALS — HEIGHT: 67 IN | WEIGHT: 180 LBS | BODY MASS INDEX: 28.25 KG/M2

## 2020-09-17 NOTE — DATA REVIEWED
[Lung Cancer Screening] : Patient underwent lung cancer screening [2] : 2 [TextBox_12] : 5/16 [TextBox_27] : 6/19

## 2020-09-17 NOTE — PLAN
[FreeTextEntry1] : - Low Dose CT chest for lung cancer screening.\par \par - Encouraged continued smoking abstinence\par \par

## 2020-09-17 NOTE — HISTORY OF PRESENT ILLNESS
[TextBox_13] : Referred by Dr. Rudolph Aguirre.\par \par Mr. MCQUEEN is a 70 year old male with a history of HTN, high cholesterol, COPD and emphysema.\par \par He was called to review eligibility for Low-Dose CT lung cancer screening.  Reviewed and confirmed that the patient meets screening eligibility criteria:\par \par 70 years old \par \par Smoking Status: Former smoker\par \par Number of pack(s) per day: 1\par Number of years smoked: 43\par Number of pack years smokin\par \par Number of years since quitting smokin\par Quit year: \par \par Mr. MCQUEEN denies any symptoms of lung cancer, including new cough, change in cough, hemoptysis, and unintentional weight loss.\par \par Mr. MCQUEEN denies any personal history of lung cancer.  Admits to lung cancer in a first degree relative, his father.  Denies any history of occupational exposures.

## 2020-09-28 ENCOUNTER — APPOINTMENT (OUTPATIENT)
Dept: CT IMAGING | Facility: CLINIC | Age: 70
End: 2020-09-28
Payer: MEDICARE

## 2020-09-28 ENCOUNTER — OUTPATIENT (OUTPATIENT)
Dept: OUTPATIENT SERVICES | Facility: HOSPITAL | Age: 70
LOS: 1 days | End: 2020-09-28
Payer: MEDICARE

## 2020-09-28 DIAGNOSIS — Z87.891 PERSONAL HISTORY OF NICOTINE DEPENDENCE: ICD-10-CM

## 2020-09-28 PROCEDURE — G0297: CPT

## 2020-09-28 PROCEDURE — G0297: CPT | Mod: 26

## 2020-10-07 ENCOUNTER — APPOINTMENT (OUTPATIENT)
Dept: INTERNAL MEDICINE | Facility: CLINIC | Age: 70
End: 2020-10-07
Payer: MEDICARE

## 2020-10-07 VITALS
DIASTOLIC BLOOD PRESSURE: 80 MMHG | SYSTOLIC BLOOD PRESSURE: 120 MMHG | HEIGHT: 67 IN | WEIGHT: 186 LBS | BODY MASS INDEX: 29.19 KG/M2 | HEART RATE: 98 BPM | OXYGEN SATURATION: 98 % | TEMPERATURE: 97.9 F

## 2020-10-07 PROCEDURE — 99214 OFFICE O/P EST MOD 30 MIN: CPT | Mod: 25

## 2020-10-07 PROCEDURE — 36415 COLL VENOUS BLD VENIPUNCTURE: CPT

## 2020-10-07 RX ORDER — NAPROXEN 500 MG/1
500 TABLET ORAL
Qty: 30 | Refills: 0 | Status: DISCONTINUED | COMMUNITY
Start: 2018-03-01 | End: 2020-10-07

## 2020-10-08 LAB
ANION GAP SERPL CALC-SCNC: 20 MMOL/L
BUN SERPL-MCNC: 17 MG/DL
CALCIUM SERPL-MCNC: 10.3 MG/DL
CHLORIDE SERPL-SCNC: 102 MMOL/L
CHOLEST SERPL-MCNC: 175 MG/DL
CHOLEST/HDLC SERPL: 2.5 RATIO
CO2 SERPL-SCNC: 19 MMOL/L
CREAT SERPL-MCNC: 1.21 MG/DL
ESTIMATED AVERAGE GLUCOSE: 146 MG/DL
GLUCOSE SERPL-MCNC: 111 MG/DL
HBA1C MFR BLD HPLC: 6.7 %
HDLC SERPL-MCNC: 71 MG/DL
LDLC SERPL CALC-MCNC: 85 MG/DL
POTASSIUM SERPL-SCNC: 4.4 MMOL/L
SODIUM SERPL-SCNC: 141 MMOL/L
TRIGL SERPL-MCNC: 94 MG/DL

## 2020-10-08 NOTE — HISTORY OF PRESENT ILLNESS
[FreeTextEntry1] : Follow up T2DM [de-identified] : -PMH: HTN, HLD, COPD (requiring O2 via NC), T2DM, GERD, BPH, Chronic Low back pain. \par -SH: . Disabled. \par \par SANDRA is a 69 year M whom is here today for follow up T2DM\par Today, pt reports that he is feeling well and is w/o complaints. \par He denies any changes since our last f/u visit\par \par -HTN: Remains on  Amlodipine 10mg & Losartan 25mg. Follows w/ Cardio. No reported changes. \par -HLD: Remains on Rosuvastatin 40mg HS. No reported changes. \par -T2DM: Remains on Metformin 1000mg BID &  Glimpiride 1mg BID. (2/2020) A1c 6.9. No reported changes. \par \par -Chronic Low Back Pain: S/p epidural injections with minimal improvement. He continues w/ PT. Follows w/ pain management. He will be following w/ Neurosurg.\par -BPH: Remains on Tamsulosin 0.4mg. Still needs to f/u w/ Uro.

## 2020-10-08 NOTE — ADDENDUM
[FreeTextEntry1] : BMP WNL\par A1c 6.7\par Lipid panel WNL\par \par Chronic conditions currently well controlled. Continue with current medications and will see patient back in 3 months for his yearly physical

## 2020-10-08 NOTE — PHYSICAL EXAM
[No Acute Distress] : no acute distress [Well Nourished] : well nourished [Well Developed] : well developed [Well-Appearing] : well-appearing [Normal Sclera/Conjunctiva] : normal sclera/conjunctiva [PERRL] : pupils equal round and reactive to light [EOMI] : extraocular movements intact [Normal Outer Ear/Nose] : the outer ears and nose were normal in appearance [Normal Oropharynx] : the oropharynx was normal [No JVD] : no jugular venous distention [No Lymphadenopathy] : no lymphadenopathy [Supple] : supple [Thyroid Normal, No Nodules] : the thyroid was normal and there were no nodules present [No Respiratory Distress] : no respiratory distress  [No Accessory Muscle Use] : no accessory muscle use [Clear to Auscultation] : lungs were clear to auscultation bilaterally [Normal Rate] : normal rate  [Regular Rhythm] : with a regular rhythm [Normal S1, S2] : normal S1 and S2 [No Murmur] : no murmur heard [No Carotid Bruits] : no carotid bruits [No Abdominal Bruit] : a ~M bruit was not heard ~T in the abdomen [No Varicosities] : no varicosities [Pedal Pulses Present] : the pedal pulses are present [No Edema] : there was no peripheral edema [No Palpable Aorta] : no palpable aorta [No Extremity Clubbing/Cyanosis] : no extremity clubbing/cyanosis [Soft] : abdomen soft [Non Tender] : non-tender [Non-distended] : non-distended [No Masses] : no abdominal mass palpated [No HSM] : no HSM [Normal Bowel Sounds] : normal bowel sounds [Normal Posterior Cervical Nodes] : no posterior cervical lymphadenopathy [Normal Anterior Cervical Nodes] : no anterior cervical lymphadenopathy [No CVA Tenderness] : no CVA  tenderness [No Spinal Tenderness] : no spinal tenderness [No Joint Swelling] : no joint swelling [Grossly Normal Strength/Tone] : grossly normal strength/tone [No Rash] : no rash [Coordination Grossly Intact] : coordination grossly intact [No Focal Deficits] : no focal deficits [Normal Gait] : normal gait [Deep Tendon Reflexes (DTR)] : deep tendon reflexes were 2+ and symmetric [Normal Affect] : the affect was normal [Normal Insight/Judgement] : insight and judgment were intact [] : left foot [de-identified] : Small scarce 1cm areas of scars on left anterior cehst. no vesicles or erythema [TWNoteComboBox1] : +2 [TWNoteComboBox2] : +2

## 2020-10-08 NOTE — ASSESSMENT
[FreeTextEntry1] : -PMH: HTN, HLD, COPD (requiring O2 via NC), T2DM, GERD, BPH, Chronic Low back pain. \par -SH: . Disabled. \par \par SANDRA is a 69 year M whom is here today for follow up T2DM\par \par -F/u labs drawn in office today\par -Further recs pending lab results\par -F/u w/ NeuroSg \par -Still needs to F/u w/ Optho (diabetic eye exam)\par -Still needs to f/u w/ GI (Colonoscopy) \par -Continue f/u w/ Cardio (HTN)\par -RTO 3mo for CPE

## 2020-10-14 ENCOUNTER — APPOINTMENT (OUTPATIENT)
Dept: NEUROSURGERY | Facility: CLINIC | Age: 70
End: 2020-10-14
Payer: MEDICARE

## 2020-10-14 VITALS
TEMPERATURE: 97.9 F | WEIGHT: 183 LBS | SYSTOLIC BLOOD PRESSURE: 155 MMHG | OXYGEN SATURATION: 95 % | HEART RATE: 74 BPM | HEIGHT: 67.5 IN | BODY MASS INDEX: 28.39 KG/M2 | DIASTOLIC BLOOD PRESSURE: 80 MMHG

## 2020-10-14 PROCEDURE — 99203 OFFICE O/P NEW LOW 30 MIN: CPT

## 2020-10-15 NOTE — ASSESSMENT
[FreeTextEntry1] : Ms. Nam presents with above history and imaging. \par \par Plan: MRI lumbar spine w/o contrast to assess worsening lumbar spine pain and weakness.\par Continue physical therapy for lumbar and LE strengthening.\par f/u after imaging.\par Patient knows to call the office if there are any new or worsening symptoms.\par

## 2020-10-15 NOTE — PHYSICAL EXAM
[General Appearance - In No Acute Distress] : in no acute distress [General Appearance - Alert] : alert [Oriented To Time, Place, And Person] : oriented to person, place, and time [Impaired Insight] : insight and judgment were intact [Affect] : the affect was normal [Person] : oriented to person [Place] : oriented to place [Time] : oriented to time [Short Term Intact] : short term memory intact [Remote Intact] : remote memory intact [Concentration Intact] : normal concentrating ability [Fluency] : fluency intact [Comprehension] : comprehension intact [Current Events] : adequate knowledge of current events [Vocabulary] : adequate range of vocabulary [Cranial Nerves Optic (II)] : visual acuity intact bilaterally,  pupils equal round and reactive to light [Cranial Nerves Oculomotor (III)] : extraocular motion intact [Cranial Nerves Trigeminal (V)] : facial sensation intact symmetrically [Cranial Nerves Facial (VII)] : face symmetrical [Cranial Nerves Glossopharyngeal (IX)] : tongue and palate midline [Cranial Nerves Accessory (XI - Cranial And Spinal)] : head turning and shoulder shrug symmetric [Cranial Nerves Hypoglossal (XII)] : there was no tongue deviation with protrusion [Motor Tone] : muscle tone was normal in all four extremities [No Muscle Atrophy] : normal bulk in all four extremities [Motor Strength] : muscle strength was normal in all four extremities [Abnormal Walk] : normal gait [Sensation Tactile Decrease] : light touch was intact [Balance] : balance was intact [2+] : Patella left 2+ [No Visual Abnormalities] : no visible abnormailities [Normal] : normal [5] : C7 extensor digitorum longus 5/5 [4] : S1 toe walking 4/5 [Sensation Pain / Temperature Decrease] : pain and temperature was intact [Past-pointing] : there was no past-pointing [Tremor] : no tremor present

## 2020-10-15 NOTE — HISTORY OF PRESENT ILLNESS
[Pain] : pain [Weakness] : weakness [Numbness] : numbness [Intermit.] : ~He/She~ states the symptoms seem to be intermittent [Bending] : worsened by bending [Walking] : worsened by walking [Heat] : relieved by heat [Recumbency] : relieved by recumbency [Rest] : relieved by rest [FreeTextEntry1] : lower back and LE pain  [de-identified] : SANDRA MCQUEEN is a 70 year old male presents for initial neurosurgical evaluation of lower back and LE pain.  Past medical history includes emphysema.   He mentions his symptoms started about 2006, progressively worsening in nature.  Patient  reports progressive lumbar region pain with intermittent radiation to L > R buttock/posterior lateral thigh/calf and foot.  Patient endorses lower extremity weakness.   Pain intensity 8/10.  Denies any saddle anesthesia, urinary or bowel dysfunction.  Patient has been ambulating with the assistance of a cane for several years at this point.  Patient endorses difficulty with ambulation.   Patient is currently involved in physical therapy but does not note any improvement as of yet.  Patient has been under the care of pain management.  No recent treatments secondary to pain management stating he needs neurosurgical evaluation given he has failed multiple pain management treatments.  He is managing his symptoms with NSAIDs as needed. \par  [Ataxia] : no ataxia [Incontinence] : no incontinence [Loss of Dexterity] : good dexterity [Urinary Ret.] : no urinary retention

## 2020-10-15 NOTE — DATA REVIEWED
[de-identified] : MRI lumbar spine from 2019 reveals multilevel degenerative changes of the lumbar spine with severe spinal canal stenosis at L4- L5.\par Moderate/severe multilevel arthrosis/ligamentum flavum hypertrophy.\par Slight anterolisthesis L4- L5.

## 2020-10-15 NOTE — REVIEW OF SYSTEMS
[As noted in HPI] : as noted in HPI [Wheezing] : wheezing [As Noted in HPI] : as noted in HPI [Joint Pain] : joint pain [Negative] : Gastrointestinal [FreeTextEntry2] : Fatigue, weakness [de-identified] : Motor changes [FreeTextEntry5] : Shortness of breath  [FreeTextEntry3] : Vision changes, blurry vision [FreeTextEntry7] : W [FreeTextEntry9] : Knee, joint swelling

## 2020-10-15 NOTE — REASON FOR VISIT
[New Patient Visit] : a new patient visit [Referred By: _________] : Patient was referred by TIGRE [FreeTextEntry1] : lumbar spinal stenosis with neurogenic claudication

## 2020-10-20 ENCOUNTER — APPOINTMENT (OUTPATIENT)
Dept: PULMONOLOGY | Facility: CLINIC | Age: 70
End: 2020-10-20

## 2020-11-09 ENCOUNTER — APPOINTMENT (OUTPATIENT)
Dept: DISASTER EMERGENCY | Facility: CLINIC | Age: 70
End: 2020-11-09

## 2020-11-12 ENCOUNTER — APPOINTMENT (OUTPATIENT)
Dept: PULMONOLOGY | Facility: CLINIC | Age: 70
End: 2020-11-12

## 2020-11-13 ENCOUNTER — RX RENEWAL (OUTPATIENT)
Age: 70
End: 2020-11-13

## 2020-11-20 ENCOUNTER — APPOINTMENT (OUTPATIENT)
Dept: DISASTER EMERGENCY | Facility: CLINIC | Age: 70
End: 2020-11-20

## 2020-11-24 ENCOUNTER — APPOINTMENT (OUTPATIENT)
Dept: PULMONOLOGY | Facility: CLINIC | Age: 70
End: 2020-11-24

## 2020-11-25 ENCOUNTER — APPOINTMENT (OUTPATIENT)
Dept: NEUROSURGERY | Facility: CLINIC | Age: 70
End: 2020-11-25
Payer: MEDICARE

## 2020-11-25 VITALS
OXYGEN SATURATION: 97 % | HEART RATE: 82 BPM | DIASTOLIC BLOOD PRESSURE: 84 MMHG | HEIGHT: 67.5 IN | BODY MASS INDEX: 28.39 KG/M2 | TEMPERATURE: 97.7 F | WEIGHT: 183 LBS | SYSTOLIC BLOOD PRESSURE: 161 MMHG

## 2020-11-25 PROCEDURE — 99214 OFFICE O/P EST MOD 30 MIN: CPT

## 2020-11-29 NOTE — REVIEW OF SYSTEMS
[Feeling Tired] : feeling tired [As Noted in HPI] : as noted in HPI [Negative] : Heme/Lymph [FreeTextEntry9] : lower back pain

## 2020-11-29 NOTE — ASSESSMENT
[FreeTextEntry1] : Mr. He presents with above history and imaging.  \par Patient has imaging findings consistent with severe lumbar spinal stenosis accountable for his lower extremity weakness.\par Patient would likely be a good surgical candidate if symptoms do not improve with conservative measures.\par Patient will obtain a CT lumbar spine for surgical planning.\par f/u after imaging.\par Patient knows to call the office if there are any new or worsening symptoms.\par

## 2020-11-29 NOTE — PHYSICAL EXAM
[General Appearance - Alert] : alert [General Appearance - In No Acute Distress] : in no acute distress [Oriented To Time, Place, And Person] : oriented to person, place, and time [Impaired Insight] : insight and judgment were intact [Affect] : the affect was normal [Person] : oriented to person [Place] : oriented to place [Time] : oriented to time [Short Term Intact] : short term memory intact [Remote Intact] : remote memory intact [Concentration Intact] : normal concentrating ability [Fluency] : fluency intact [Comprehension] : comprehension intact [Current Events] : adequate knowledge of current events [Vocabulary] : adequate range of vocabulary [Cranial Nerves Optic (II)] : visual acuity intact bilaterally,  pupils equal round and reactive to light [Cranial Nerves Oculomotor (III)] : extraocular motion intact [Cranial Nerves Trigeminal (V)] : facial sensation intact symmetrically [Cranial Nerves Facial (VII)] : face symmetrical [Cranial Nerves Glossopharyngeal (IX)] : tongue and palate midline [Cranial Nerves Accessory (XI - Cranial And Spinal)] : head turning and shoulder shrug symmetric [Cranial Nerves Hypoglossal (XII)] : there was no tongue deviation with protrusion [Motor Tone] : muscle tone was normal in all four extremities [Motor Strength] : muscle strength was normal in all four extremities [No Muscle Atrophy] : normal bulk in all four extremities [5] : C7 extensor digitorum longus 5/5 [4] : S1 toe walking 4/5 [Sensation Tactile Decrease] : light touch was intact [Sensation Pain / Temperature Decrease] : pain and temperature was intact [Abnormal Walk] : normal gait [Balance] : balance was intact [2+] : Patella left 2+ [Antalgic] : antalgic [Past-pointing] : there was no past-pointing [Tremor] : no tremor present

## 2020-11-29 NOTE — REASON FOR VISIT
[Follow-Up: _____] : a [unfilled] follow-up visit [FreeTextEntry1] : \par SANDRA MCQUEEN is a 70 year old male presents for follow up visit and review of MRI lumbar spine of lower back and LE pain. Past medical history includes emphysema. He mentions his symptoms started about 2006, progressively worsening in nature. Patient reports progressive lumbar region pain with intermittent radiation to L > R buttock/posterior lateral thigh/calf and foot. Patient endorses lower extremity weakness. Pain intensity 8/10. Denies any saddle anesthesia, urinary or bowel dysfunction. Patient has been ambulating with the assistance of a cane for several years at this point. Patient endorses difficulty with ambulation. Patient is currently involved in physical therapy but does not note any improvement as of yet. Patient has been under the care of pain management. No recent treatments secondary to pain management stating he needs neurosurgical evaluation given he has failed multiple pain management treatments. He is managing his symptoms with NSAIDs as needed. \par Patient was unable to tolerate physical therapy secondary to wearing masks during exercises which exacerbates his emphysema. \par

## 2020-12-02 ENCOUNTER — RX RENEWAL (OUTPATIENT)
Age: 70
End: 2020-12-02

## 2020-12-04 ENCOUNTER — RX RENEWAL (OUTPATIENT)
Age: 70
End: 2020-12-04

## 2020-12-14 ENCOUNTER — APPOINTMENT (OUTPATIENT)
Dept: CT IMAGING | Facility: CLINIC | Age: 70
End: 2020-12-14
Payer: MEDICARE

## 2020-12-14 ENCOUNTER — RESULT REVIEW (OUTPATIENT)
Age: 70
End: 2020-12-14

## 2020-12-14 ENCOUNTER — OUTPATIENT (OUTPATIENT)
Dept: OUTPATIENT SERVICES | Facility: HOSPITAL | Age: 70
LOS: 1 days | End: 2020-12-14
Payer: MEDICARE

## 2020-12-14 DIAGNOSIS — Z00.8 ENCOUNTER FOR OTHER GENERAL EXAMINATION: ICD-10-CM

## 2020-12-14 DIAGNOSIS — M51.36 OTHER INTERVERTEBRAL DISC DEGENERATION, LUMBAR REGION: ICD-10-CM

## 2020-12-14 DIAGNOSIS — M51.26 OTHER INTERVERTEBRAL DISC DISPLACEMENT, LUMBAR REGION: ICD-10-CM

## 2020-12-14 PROCEDURE — 72131 CT LUMBAR SPINE W/O DYE: CPT

## 2020-12-14 PROCEDURE — 72114 X-RAY EXAM L-S SPINE BENDING: CPT | Mod: 26

## 2020-12-14 PROCEDURE — 72131 CT LUMBAR SPINE W/O DYE: CPT | Mod: 26

## 2020-12-14 PROCEDURE — 72114 X-RAY EXAM L-S SPINE BENDING: CPT

## 2020-12-15 ENCOUNTER — APPOINTMENT (OUTPATIENT)
Dept: NEUROSURGERY | Facility: CLINIC | Age: 70
End: 2020-12-15
Payer: MEDICARE

## 2020-12-15 VITALS
HEIGHT: 67.5 IN | TEMPERATURE: 97.7 F | HEART RATE: 86 BPM | OXYGEN SATURATION: 97 % | SYSTOLIC BLOOD PRESSURE: 161 MMHG | DIASTOLIC BLOOD PRESSURE: 93 MMHG

## 2020-12-15 PROCEDURE — 99072 ADDL SUPL MATRL&STAF TM PHE: CPT

## 2020-12-15 PROCEDURE — 99214 OFFICE O/P EST MOD 30 MIN: CPT

## 2020-12-16 NOTE — ASSESSMENT
[FreeTextEntry1] : Mr. He presents with above history and imaging. \par Patient has imaging findings consistent with severe lumbar spinal stenosis accountable for his lower extremity radiculopathy.\par Patient would likely merit consideration of surgical intervention if symptoms do not improve with conservative measures.\par Patient will consider trying physical therapy for improvement of his lower back and LE strength- at home is recommended secondary to his COPD issues. \par Patient knows to call the office if there are any new or worsening symptoms.\par  \par

## 2020-12-16 NOTE — REVIEW OF SYSTEMS
[Feeling Tired] : feeling tired [As Noted in HPI] : as noted in HPI [Leg Weakness] : leg weakness [Poor Coordination] : poor coordination [Numbness] : numbness [Inability to Walk] : inability to walk [Limping] : limping [Negative] : Heme/Lymph [Seizures] : no convulsions [Dizziness] : no dizziness [Tension Headache] : no tension-type headache [Difficulty Walking] : no difficulty walking [FreeTextEntry9] : lower back pain

## 2020-12-16 NOTE — PHYSICAL EXAM
[General Appearance - Alert] : alert [General Appearance - In No Acute Distress] : in no acute distress [Oriented To Time, Place, And Person] : oriented to person, place, and time [Impaired Insight] : insight and judgment were intact [Affect] : the affect was normal [Person] : oriented to person [Place] : oriented to place [Time] : oriented to time [Short Term Intact] : short term memory intact [Remote Intact] : remote memory intact [Concentration Intact] : normal concentrating ability [Fluency] : fluency intact [Comprehension] : comprehension intact [Current Events] : adequate knowledge of current events [Vocabulary] : adequate range of vocabulary [Cranial Nerves Optic (II)] : visual acuity intact bilaterally,  pupils equal round and reactive to light [Cranial Nerves Oculomotor (III)] : extraocular motion intact [Cranial Nerves Trigeminal (V)] : facial sensation intact symmetrically [Cranial Nerves Facial (VII)] : face symmetrical [Cranial Nerves Glossopharyngeal (IX)] : tongue and palate midline [Cranial Nerves Accessory (XI - Cranial And Spinal)] : head turning and shoulder shrug symmetric [Cranial Nerves Hypoglossal (XII)] : there was no tongue deviation with protrusion [Motor Tone] : muscle tone was normal in all four extremities [Motor Strength] : muscle strength was normal in all four extremities [No Muscle Atrophy] : normal bulk in all four extremities [5] : C7 extensor digitorum longus 5/5 [4] : S1 toe walking 4/5 [Sensation Tactile Decrease] : light touch was intact [Sensation Pain / Temperature Decrease] : pain and temperature was intact [Abnormal Walk] : normal gait [Balance] : balance was intact [2+] : Patella left 2+ [No Visual Abnormalities] : no visible abnormailities [Antalgic] : antalgic [Past-pointing] : there was no past-pointing [Tremor] : no tremor present

## 2020-12-16 NOTE — DATA REVIEWED
[de-identified] : EXAM: SPINE LUMBOSAC MIN 6 V W BEND\par \par \par PROCEDURE DATE: 12/14/2020\par \par \par \par INTERPRETATION: CLINICAL INDICATION: back pain\par \par EXAM:\par AP; neutral flexion extension lateral; and bilateral oblique lumbosacral spine from 12/14/2020 at 1400. Compared to appearance of the spine on CT from 7/3/2017.\par \par IMPRESSION:\par No compression fractures.\par \par Redemonstrated multilevel degenerative disease manifest by varying degrees of disc space narrowing with disc margin osteophyte formation and posterior facet arthrosis. Spinal dextrocurvature.\par \par Slight L2 on L3 retrolisthesis again noted however without associated spondylolysis defects or evidence for dynamic instability. 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 No additional focal osseous lesions.\par \par Also correlate with findings on concurrently performed L-spine CT.\par \par \par \par \par

## 2020-12-18 ENCOUNTER — NON-APPOINTMENT (OUTPATIENT)
Age: 70
End: 2020-12-18

## 2020-12-21 ENCOUNTER — NON-APPOINTMENT (OUTPATIENT)
Age: 70
End: 2020-12-21

## 2021-01-25 ENCOUNTER — RX RENEWAL (OUTPATIENT)
Age: 71
End: 2021-01-25

## 2021-02-03 ENCOUNTER — RX RENEWAL (OUTPATIENT)
Age: 71
End: 2021-02-03

## 2021-02-25 ENCOUNTER — RX RENEWAL (OUTPATIENT)
Age: 71
End: 2021-02-25

## 2021-03-01 ENCOUNTER — RX RENEWAL (OUTPATIENT)
Age: 71
End: 2021-03-01

## 2021-03-04 ENCOUNTER — RX RENEWAL (OUTPATIENT)
Age: 71
End: 2021-03-04

## 2021-03-09 ENCOUNTER — APPOINTMENT (OUTPATIENT)
Dept: INTERNAL MEDICINE | Facility: CLINIC | Age: 71
End: 2021-03-09

## 2021-03-09 DIAGNOSIS — K76.0 FATTY (CHANGE OF) LIVER, NOT ELSEWHERE CLASSIFIED: ICD-10-CM

## 2021-03-16 ENCOUNTER — RX RENEWAL (OUTPATIENT)
Age: 71
End: 2021-03-16

## 2021-03-16 NOTE — ASSESSMENT
[FreeTextEntry1] : Check labs, further recommendations based on results. \par BP control improved. Continue current medications. \par Flomax for BPH/nocturia.\par Encouraged follow up with pulmonary. \par FIT negative 12/17. \par Declines Prevnar.\par Follow up in 3 months.\par \par  negative...

## 2021-03-23 ENCOUNTER — NON-APPOINTMENT (OUTPATIENT)
Age: 71
End: 2021-03-23

## 2021-03-23 ENCOUNTER — APPOINTMENT (OUTPATIENT)
Dept: INTERNAL MEDICINE | Facility: CLINIC | Age: 71
End: 2021-03-23
Payer: MEDICARE

## 2021-03-23 VITALS
BODY MASS INDEX: 26.99 KG/M2 | HEIGHT: 67.5 IN | TEMPERATURE: 97.8 F | WEIGHT: 174 LBS | OXYGEN SATURATION: 98 % | HEART RATE: 91 BPM | RESPIRATION RATE: 18 BRPM | DIASTOLIC BLOOD PRESSURE: 70 MMHG | SYSTOLIC BLOOD PRESSURE: 132 MMHG

## 2021-03-23 DIAGNOSIS — Z83.3 FAMILY HISTORY OF DIABETES MELLITUS: ICD-10-CM

## 2021-03-23 DIAGNOSIS — Z00.00 ENCOUNTER FOR GENERAL ADULT MEDICAL EXAMINATION W/OUT ABNORMAL FINDINGS: ICD-10-CM

## 2021-03-23 DIAGNOSIS — Z80.3 FAMILY HISTORY OF MALIGNANT NEOPLASM OF BREAST: ICD-10-CM

## 2021-03-23 PROCEDURE — 99072 ADDL SUPL MATRL&STAF TM PHE: CPT

## 2021-03-23 PROCEDURE — 93000 ELECTROCARDIOGRAM COMPLETE: CPT

## 2021-03-23 PROCEDURE — G0439: CPT

## 2021-03-23 RX ORDER — CAPSAICIN 0.03 G/100G
0.03 CREAM TOPICAL 4 TIMES DAILY
Qty: 1 | Refills: 0 | Status: DISCONTINUED | COMMUNITY
Start: 2019-10-30 | End: 2021-03-23

## 2021-03-23 NOTE — HISTORY OF PRESENT ILLNESS
[FreeTextEntry1] : Annual well visit [de-identified] : -PMH: HTN, HLD, COPD (requiring O2 via NC), T2DM, GERD, BPH, Chronic Low back pain. \par -SH: . 4 sons & 3 daughter. Disabled. Former Smoker (Quit ). Drinks 2-3 beers daily. \par \par SANDRA is a 70 year M whom is here today for an annual well check \par Today, pt reports feeling well and mentions that plan is likely to proceed with spinal surgery. He is also moving into a 1st floor apartment,\par He denies any changes since our last f/u visit\par \par Specialists Involved:\par -Pulm: Dr. Aguirre\par -NeuroSx: Dr. Jones\par -Cardio: Dr. Ruiz\par \par -Vaccines: Needs Shingles\par -Colonoscopy: Still needs to schedule appointment w/ GI. \par -Lung CT CA Screenin2020\par -FH of Prostate, Colon, breast or ovarian CA: Mother had Breast CA\par \par -HTN: Remains on Amlodipine 10mg & Losartan 25mg. Follows w/ Cardio. No reported changes. \par -HLD: Remains on Rosuvastatin 40mg HS. No reported changes. \par -T2DM: Remains on Metformin 1000mg BID (Reports non-sompliance) & Glimpiride 1mg BID. On Aspirin & Statin. (10/2020) A1c 6.7. Non-compliant w/ Optho Eval. No reported changes.\par \par -COPD: Reports uncontrolled on current Inhalers. \par \par -Chronic Low Back Pain: S/p epidural injections with minimal improvement. He continues w/ PT. Follows w/ pain management. He will be following w/ Neurosurg.\par -BPH: Remains on Tamsulosin 0.4mg. Still needs to f/u w/ Uro. No reported changes. \par -GERD: Remains on PPI.

## 2021-03-23 NOTE — ASSESSMENT
[FreeTextEntry1] : -PMH: HTN, HLD, COPD (requiring O2 via NC), T2DM, GERD, BPH, Chronic Low back pain. \par -SH: . 4 sons & 3 daughter. Disabled. Former Smoker (Quit 2011). Drinks 2-3 beers daily.\par \par SANDRA is a 69 year M whom is here today for his CPE\par \par Specialists:\par -Pulm: Dr. Aguirre\par -NeuroSx: Dr. Jones\par -Cardio: Dr. Chris\par \par -F/u labs drawn in office today\par -Further recs pending lab results\par -F/u FIT-DNA\par -Still needs to F/u w/ Optho (diabetic eye exam)\par -Still needs to f/u w/ GI (Colonoscopy) \par -Continue F/u w/ NeuroSg \par -Continue f/u w/ Cardio (HTN)\par -RTO 3mo for routine f/u & labs or sooner if needed

## 2021-03-23 NOTE — HEALTH RISK ASSESSMENT
[30 or more] : 30 or more [Yes] : Yes [4 or more  times a week (4 pts)] : 4 or more  times a week (4 points) [3 or 4 (1 pt)] : 3 or 4  (1 point) [Never (0 pts)] : Never (0 points) [No] : In the past 12 months have you used drugs other than those required for medical reasons? No [No falls in past year] : Patient reported no falls in the past year [0] : 2) Feeling down, depressed, or hopeless: Not at all (0) [Patient declined Retinal Exam] : Patient declined Retinal Exam. [Patient declined colonoscopy] : Patient declined colonoscopy [HIV test declined] : HIV test declined [Hepatitis C test declined] : Hepatitis C test declined [None] : None [With Family] : lives with family [Retired] : retired [] :  [Fully functional (bathing, dressing, toileting, transferring, walking, feeding)] : Fully functional (bathing, dressing, toileting, transferring, walking, feeding) [Fully functional (using the telephone, shopping, preparing meals, housekeeping, doing laundry, using] : Fully functional and needs no help or supervision to perform IADLs (using the telephone, shopping, preparing meals, housekeeping, doing laundry, using transportation, managing medications and managing finances) [] : No [YearQuit] : 2011 [Audit-CScore] : 5 [MEH2Rzodx] : 0 [LowDoseCTScan] : 09/20 [EyeExamDate] : 03/21 [Change in mental status noted] : No change in mental status noted [Reports changes in hearing] : Reports no changes in hearing [Reports changes in vision] : Reports no changes in vision [ColonoscopyDate] : 03/21 [AdvancecareDate] : 03/21

## 2021-03-24 ENCOUNTER — NON-APPOINTMENT (OUTPATIENT)
Age: 71
End: 2021-03-24

## 2021-03-24 LAB
ALBUMIN SERPL ELPH-MCNC: 4.6 G/DL
ALP BLD-CCNC: 121 U/L
ALT SERPL-CCNC: 16 U/L
ANION GAP SERPL CALC-SCNC: 18 MMOL/L
AST SERPL-CCNC: 23 U/L
BASOPHILS # BLD AUTO: 0.09 K/UL
BASOPHILS NFR BLD AUTO: 0.8 %
BILIRUB SERPL-MCNC: <0.2 MG/DL
BUN SERPL-MCNC: 14 MG/DL
CALCIUM SERPL-MCNC: 9.7 MG/DL
CHLORIDE SERPL-SCNC: 104 MMOL/L
CHOLEST SERPL-MCNC: 148 MG/DL
CO2 SERPL-SCNC: 22 MMOL/L
CREAT SERPL-MCNC: 1.18 MG/DL
CREAT SPEC-SCNC: 122 MG/DL
EOSINOPHIL # BLD AUTO: 0.46 K/UL
EOSINOPHIL NFR BLD AUTO: 4.1 %
ESTIMATED AVERAGE GLUCOSE: 148 MG/DL
GLUCOSE SERPL-MCNC: 92 MG/DL
HBA1C MFR BLD HPLC: 6.8 %
HCT VFR BLD CALC: 39.6 %
HDLC SERPL-MCNC: 51 MG/DL
HGB BLD-MCNC: 12.4 G/DL
IMM GRANULOCYTES NFR BLD AUTO: 0.3 %
LDLC SERPL CALC-MCNC: 55 MG/DL
LYMPHOCYTES # BLD AUTO: 4.08 K/UL
LYMPHOCYTES NFR BLD AUTO: 36.4 %
MAN DIFF?: NORMAL
MCHC RBC-ENTMCNC: 29 PG
MCHC RBC-ENTMCNC: 31.3 GM/DL
MCV RBC AUTO: 92.5 FL
MICROALBUMIN 24H UR DL<=1MG/L-MCNC: 3.4 MG/DL
MICROALBUMIN/CREAT 24H UR-RTO: 28 MG/G
MONOCYTES # BLD AUTO: 0.92 K/UL
MONOCYTES NFR BLD AUTO: 8.2 %
NEUTROPHILS # BLD AUTO: 5.62 K/UL
NEUTROPHILS NFR BLD AUTO: 50.2 %
NONHDLC SERPL-MCNC: 97 MG/DL
PLATELET # BLD AUTO: 341 K/UL
POTASSIUM SERPL-SCNC: 4.1 MMOL/L
PROT SERPL-MCNC: 7.6 G/DL
PSA SERPL-MCNC: 1.04 NG/ML
RBC # BLD: 4.28 M/UL
RBC # FLD: 13.6 %
SODIUM SERPL-SCNC: 144 MMOL/L
TRIGL SERPL-MCNC: 210 MG/DL
WBC # FLD AUTO: 11.2 K/UL

## 2021-04-20 ENCOUNTER — APPOINTMENT (OUTPATIENT)
Dept: DISASTER EMERGENCY | Facility: CLINIC | Age: 71
End: 2021-04-20

## 2021-04-21 LAB — SARS-COV-2 N GENE NPH QL NAA+PROBE: NOT DETECTED

## 2021-04-27 ENCOUNTER — RX RENEWAL (OUTPATIENT)
Age: 71
End: 2021-04-27

## 2021-05-04 ENCOUNTER — RX RENEWAL (OUTPATIENT)
Age: 71
End: 2021-05-04

## 2021-05-19 ENCOUNTER — APPOINTMENT (OUTPATIENT)
Dept: DISASTER EMERGENCY | Facility: CLINIC | Age: 71
End: 2021-05-19

## 2021-05-20 LAB — SARS-COV-2 N GENE NPH QL NAA+PROBE: NOT DETECTED

## 2021-05-21 ENCOUNTER — RX CHANGE (OUTPATIENT)
Age: 71
End: 2021-05-21

## 2021-05-21 ENCOUNTER — APPOINTMENT (OUTPATIENT)
Dept: PULMONOLOGY | Facility: CLINIC | Age: 71
End: 2021-05-21
Payer: MEDICARE

## 2021-05-21 VITALS
OXYGEN SATURATION: 97 % | HEART RATE: 88 BPM | RESPIRATION RATE: 16 BRPM | SYSTOLIC BLOOD PRESSURE: 122 MMHG | DIASTOLIC BLOOD PRESSURE: 80 MMHG

## 2021-05-21 VITALS — WEIGHT: 185 LBS | HEIGHT: 67 IN | BODY MASS INDEX: 29.03 KG/M2 | TEMPERATURE: 99 F

## 2021-05-21 PROCEDURE — 94729 DIFFUSING CAPACITY: CPT

## 2021-05-21 PROCEDURE — 99214 OFFICE O/P EST MOD 30 MIN: CPT | Mod: 25

## 2021-05-21 PROCEDURE — 94010 BREATHING CAPACITY TEST: CPT

## 2021-05-21 PROCEDURE — 94727 GAS DIL/WSHOT DETER LNG VOL: CPT

## 2021-05-21 PROCEDURE — 85018 HEMOGLOBIN: CPT | Mod: QW

## 2021-05-21 RX ORDER — PREDNISONE 10 MG/1
10 TABLET ORAL
Qty: 20 | Refills: 0 | Status: DISCONTINUED | COMMUNITY
Start: 2020-12-04 | End: 2021-05-21

## 2021-05-21 RX ORDER — TRAMADOL HYDROCHLORIDE 50 MG/1
50 TABLET, COATED ORAL
Refills: 0 | Status: DISCONTINUED | COMMUNITY
End: 2021-05-21

## 2021-05-21 RX ORDER — OXYCODONE AND ACETAMINOPHEN 7.5; 325 MG/1; MG/1
7.5-325 TABLET ORAL
Qty: 90 | Refills: 0 | Status: DISCONTINUED | COMMUNITY
Start: 2020-07-10 | End: 2021-05-21

## 2021-05-21 RX ORDER — FOLIC ACID 1 MG/1
1 TABLET ORAL
Refills: 0 | Status: DISCONTINUED | COMMUNITY
End: 2021-05-21

## 2021-05-21 RX ORDER — SILDENAFIL 50 MG/1
50 TABLET ORAL
Qty: 10 | Refills: 1 | Status: DISCONTINUED | COMMUNITY
Start: 2018-12-28 | End: 2021-05-21

## 2021-05-21 RX ORDER — SILDENAFIL 20 MG/1
20 TABLET ORAL
Qty: 15 | Refills: 0 | Status: DISCONTINUED | COMMUNITY
Start: 2019-01-02 | End: 2021-05-21

## 2021-05-21 RX ORDER — OXYCODONE AND ACETAMINOPHEN 10; 325 MG/1; MG/1
10-325 TABLET ORAL
Qty: 60 | Refills: 0 | Status: DISCONTINUED | COMMUNITY
Start: 2020-09-23 | End: 2021-05-21

## 2021-05-24 ENCOUNTER — RX RENEWAL (OUTPATIENT)
Age: 71
End: 2021-05-24

## 2021-05-25 ENCOUNTER — RX RENEWAL (OUTPATIENT)
Age: 71
End: 2021-05-25

## 2021-05-26 ENCOUNTER — RX RENEWAL (OUTPATIENT)
Age: 71
End: 2021-05-26

## 2021-06-22 ENCOUNTER — NON-APPOINTMENT (OUTPATIENT)
Age: 71
End: 2021-06-22

## 2021-06-30 ENCOUNTER — RX RENEWAL (OUTPATIENT)
Age: 71
End: 2021-06-30

## 2021-07-02 ENCOUNTER — RX RENEWAL (OUTPATIENT)
Age: 71
End: 2021-07-02

## 2021-07-08 ENCOUNTER — APPOINTMENT (OUTPATIENT)
Dept: NEUROSURGERY | Facility: CLINIC | Age: 71
End: 2021-07-08

## 2021-07-22 ENCOUNTER — APPOINTMENT (OUTPATIENT)
Dept: NEUROSURGERY | Facility: CLINIC | Age: 71
End: 2021-07-22
Payer: MEDICARE

## 2021-07-22 VITALS
BODY MASS INDEX: 30.13 KG/M2 | DIASTOLIC BLOOD PRESSURE: 75 MMHG | HEIGHT: 67 IN | OXYGEN SATURATION: 97 % | TEMPERATURE: 98.7 F | WEIGHT: 192 LBS | HEART RATE: 89 BPM | SYSTOLIC BLOOD PRESSURE: 127 MMHG

## 2021-07-22 DIAGNOSIS — M51.36 OTHER INTERVERTEBRAL DISC DEGENERATION, LUMBAR REGION: ICD-10-CM

## 2021-07-22 PROCEDURE — 99213 OFFICE O/P EST LOW 20 MIN: CPT

## 2021-07-27 NOTE — REASON FOR VISIT
[Follow-Up: _____] : a [unfilled] follow-up visit [FreeTextEntry1] : SANRDA MCQUEEN is a 70 year old male presents for follow up visit for complaints of lower back and LE pain and surgical discussion. Last seen 12/2020.  Today he is accompanied by his wife. Past medical history includes emphysema. He mentions his symptoms started about 2006, progressively worsening in nature. Patient reports progressive lumbar region pain with intermittent radiation to L > R buttock/posterior lateral thigh/calf and foot. Patient endorses lower extremity weakness. Pain intensity 8/10. Denies any saddle anesthesia, urinary or bowel dysfunction. Patient has been ambulating with the assistance of a cane for several years at this point. Patient endorses difficulty with ambulation. Patient is currently involved in physical therapy but does not note any improvement as of yet. Patient has been under the care of pain management. No recent treatments secondary to pain management stating he needs neurosurgical evaluation given he has failed multiple pain management treatments. He is managing his symptoms with NSAIDs as needed. No falls or injuries. Patient ambulates with the assistance of a cane at baseline but endorses it is more challenging to climb his 20 flights of stairs to enter his apartment. \par Patient was unable to tolerate physical therapy secondary to wearing masks during exercises which exacerbates his emphysema. \par \par

## 2021-07-27 NOTE — ASSESSMENT
[FreeTextEntry1] : I have seen and examined the patient along with my nurse practitioner, Yudith Lerma.  I have personally determined the assessment and plan of care based on today's encounter.\par \par Mr. He presents with above history and imaging. \par Patient has imaging findings consistent with severe lumbar spinal stenosis accountable for his lower extremity radiculopathy.\par Patient would likely merit consideration of surgical intervention if symptoms do not improve with conservative measures.\par Patient will consider trying physical therapy for improvement of his lower back and LE strength- at home is recommended secondary to his COPD issues. \par Patient knows to call the office if there are any new or worsening symptoms.\par

## 2021-08-13 ENCOUNTER — APPOINTMENT (OUTPATIENT)
Dept: INTERNAL MEDICINE | Facility: CLINIC | Age: 71
End: 2021-08-13
Payer: MEDICARE

## 2021-08-13 VITALS
HEIGHT: 67 IN | WEIGHT: 185 LBS | SYSTOLIC BLOOD PRESSURE: 136 MMHG | HEART RATE: 101 BPM | RESPIRATION RATE: 16 BRPM | OXYGEN SATURATION: 96 % | TEMPERATURE: 97.7 F | BODY MASS INDEX: 29.03 KG/M2 | DIASTOLIC BLOOD PRESSURE: 70 MMHG

## 2021-08-13 PROCEDURE — 99214 OFFICE O/P EST MOD 30 MIN: CPT | Mod: 25

## 2021-08-13 NOTE — ASSESSMENT
[FreeTextEntry1] : -PMH: T2DM, HTN, HLD, COPD (requiring O2 via NC), GERD, BPH, Chronic Low back pain. \par -SH: . 4 sons & 3 daughter. Disabled. Former Smoker (Quit 2011). Drinks 2-3 beers daily.\par \par SANDRA is a 69 year M whom is here today for f/u HTN, T2DM\par \par Specialists:\par -Pulm: Dr. Aguirre\par -NeuroSx: Dr. Jones\par -Cardio: Dr. Chris\par \par -F/u labs drawn in office today\par -Further recs pending lab results\par -Still needs to submit FIT-DNA\par -Still needs to F/u w/ Optho (diabetic eye exam)\par -Still needs to f/u w/ GI (Colonoscopy) \par -Continue F/u w/ NeuroSg \par -Continue f/u w/ Cardio (HTN)\par -RTO 4mo for routine f/u & labs or sooner if needed

## 2021-08-13 NOTE — HISTORY OF PRESENT ILLNESS
[FreeTextEntry1] :  HTN, T2DM [de-identified] : -PMH: HTN, HLD, COPD (requiring O2 via NC), T2DM, GERD, BPH, Chronic Low back pain. \par -SH: . 4 sons & 3 daughter. Disabled. Former Smoker (Quit 2011). Drinks 2-3 beers daily. \par \par SANDRA is a 70 year M whom is here today for f/u HTN, T2DM\par Today, pt reports feeling well \par He denies any changes since our last f/u visit\par \par -HTN: Remains on Amlodipine 10mg & Losartan 25mg. Follows w/ Cardio. No reported changes. \par -HLD: Remains on Rosuvastatin 40mg HS. No reported changes. \par -T2DM: Remains on Metformin 1000mg BID (Reports non-sompliance) & Glimpiride 1mg BID. On Aspirin & Statin. (3/2020) A1c 6.8. Non-compliant w/ Optho Eval. No reported changes.\par \par -COPD: Reports uncontrolled on current Inhalers. \par \par -Chronic Low Back Pain: S/p epidural injections with minimal improvement. He continues w/ PT. Follows w/ pain management. He will be following w/ Neurosurg.\par -BPH: Remains on Tamsulosin 0.4mg. Still needs to f/u w/ Uro. No reported changes. \par -GERD: Remains on PPI.

## 2021-08-16 ENCOUNTER — RX RENEWAL (OUTPATIENT)
Age: 71
End: 2021-08-16

## 2021-08-16 ENCOUNTER — NON-APPOINTMENT (OUTPATIENT)
Age: 71
End: 2021-08-16

## 2021-08-16 DIAGNOSIS — Z86.2 PERSONAL HISTORY OF DISEASES OF THE BLOOD AND BLOOD-FORMING ORGANS AND CERTAIN DISORDERS INVOLVING THE IMMUNE MECHANISM: ICD-10-CM

## 2021-08-16 DIAGNOSIS — Z01.818 ENCOUNTER FOR OTHER PREPROCEDURAL EXAMINATION: ICD-10-CM

## 2021-08-16 LAB
BASOPHILS # BLD AUTO: 0.07 K/UL
BASOPHILS NFR BLD AUTO: 0.7 %
EOSINOPHIL # BLD AUTO: 0.3 K/UL
EOSINOPHIL NFR BLD AUTO: 3 %
ESTIMATED AVERAGE GLUCOSE: 148 MG/DL
HBA1C MFR BLD HPLC: 6.8 %
HCT VFR BLD CALC: 42.1 %
HGB BLD-MCNC: 13.2 G/DL
IMM GRANULOCYTES NFR BLD AUTO: 0.2 %
LYMPHOCYTES # BLD AUTO: 3.87 K/UL
LYMPHOCYTES NFR BLD AUTO: 39 %
MAN DIFF?: NORMAL
MCHC RBC-ENTMCNC: 28.6 PG
MCHC RBC-ENTMCNC: 31.4 GM/DL
MCV RBC AUTO: 91.3 FL
MONOCYTES # BLD AUTO: 0.67 K/UL
MONOCYTES NFR BLD AUTO: 6.7 %
NEUTROPHILS # BLD AUTO: 5 K/UL
NEUTROPHILS NFR BLD AUTO: 50.4 %
PLATELET # BLD AUTO: 311 K/UL
RBC # BLD: 4.61 M/UL
RBC # FLD: 14.3 %
WBC # FLD AUTO: 9.93 K/UL

## 2021-08-16 RX ORDER — METFORMIN HYDROCHLORIDE 1000 MG/1
1000 TABLET, EXTENDED RELEASE ORAL
Qty: 90 | Refills: 0 | Status: DISCONTINUED | COMMUNITY
Start: 2021-08-13 | End: 2021-08-16

## 2021-08-24 ENCOUNTER — APPOINTMENT (OUTPATIENT)
Dept: PULMONOLOGY | Facility: CLINIC | Age: 71
End: 2021-08-24

## 2021-08-30 ENCOUNTER — RX RENEWAL (OUTPATIENT)
Age: 71
End: 2021-08-30

## 2021-09-28 NOTE — PATIENT PROFILE ADULT - NSPROHMRESPSYMPCOND_GEN_A_NUR
Preoperative Evaluation    Date of Exam: 2021    Memo Perla is a 73 y.o. female (:1950) who presents for preoperative evaluation. Shingle rash has cleared up. Latex Allergy: no    Problem List:     Patient Active Problem List    Diagnosis Date Noted    Primary insomnia 2019    Essential hypertension 2019    Hyperlipidemia 2011    Arthritis 2011     Medical History:     Past Medical History:   Diagnosis Date    Arthritis     Hypercholesterolemia     Sun-damaged skin     Sunburn, blistering      Allergies:   No Known Allergies   Medications:     Current Outpatient Medications   Medication Sig    traZODone (DESYREL) 150 mg tablet TAKE 1 TABLET NIGHTLY (NEED APPOINTMENT FOR ADDITIONAL REFILLS FOR CHRONIC MEDICAL CARE MANAGEMENT)    metoprolol tartrate (LOPRESSOR) 25 mg tablet Take 1 Tablet by mouth two (2) times a day.  diclofenac (VOLTAREN) 1 % gel APPLY 2GM TO AFFECTED AREA 4 TIMES DAILY FOR 30 DAYS    atorvastatin (LIPITOR) 10 mg tablet TAKE 1 TABLET DAILY    alendronate (FOSAMAX) 70 mg tablet TAKE 1 TABLET EVERY 7 DAYS (NEEDS AN APPOINTMENT BEFORE NEXT REFILL)    aspirin delayed-release 81 mg tablet Take 81 mg by mouth daily.  calcium 500 mg Tab Take 1 Tab by mouth daily.  Cholecalciferol, Vitamin D3, (VITAMIN D) 1,000 unit Cap Take 1 Tab by mouth daily.  fish oil-dha-epa (FISH OIL) 1,200-144-216 mg Cap Take 2 Caps by mouth daily. No current facility-administered medications for this visit.      Surgical History:     Past Surgical History:   Procedure Laterality Date    ENDOSCOPY, COLON, DIAGNOSTIC  3/7/2012/2017    repeat in 5 years due family hx    HX GYN          HX ORTHOPAEDIC      rotator cuff tear     Social History:     Social History     Socioeconomic History    Marital status:      Spouse name: Not on file    Number of children: Not on file    Years of education: Not on file    Highest education level: Not on file   Tobacco Use    Smoking status: Never Smoker    Smokeless tobacco: Never Used   Substance and Sexual Activity    Alcohol use: Yes     Comment: socially    Drug use: No    Sexual activity: Yes     Partners: Male     Social Determinants of Health     Financial Resource Strain:     Difficulty of Paying Living Expenses:    Food Insecurity:     Worried About Running Out of Food in the Last Year:     [address] in the Last Year:    Transportation Needs:     Lack of Transportation (Medical):  Lack of Transportation (Non-Medical):    Physical Activity:     Days of Exercise per Week:     Minutes of Exercise per Session:    Stress:     Feeling of Stress :    Social Connections:     Frequency of Communication with Friends and Family:     Frequency of Social Gatherings with Friends and Family:     Attends Buddhism Services:     Active Member of Clubs or Organizations:     Attends Club or Organization Meetings:     Marital Status:        Anesthesia Complications: None  History of abnormal bleeding : None  History of Blood Transfusions: no  Health Care Directive or Living Will: yes    Objective:     ROS:   Feeling well. No dyspnea or chest pain on exertion. No abdominal pain, change in bowel habits, black or bloody stools. No urinary tract symptoms. GYN ROS:  no abnormal bleeding, pelvic pain or discharge\",. No neurological complaints. OBJECTIVE:   The patient appears well, alert, oriented x 3, in no distress. Visit Vitals  /80 (BP 1 Location: Right arm, BP Patient Position: Sitting)   Pulse 68   Temp 97.6 °F (36.4 °C) (Temporal)   Resp 18   Ht 5' 2\" (1.575 m)   Wt 138 lb (62.6 kg)   SpO2 97%   BMI 25.24 kg/m²     HEENT:ENT normal.  Neck supple. No adenopathy or thyromegaly. DWIGHT. Chest: Lungs are clear, good air entry, no wheezes, rhonchi or rales. Cardiovascular: S1 and S2 normal, no murmurs, regular rate and rhythm.    Abdomen: soft without tenderness, guarding, mass or organomegaly. Extremities: show no edema, normal peripheral pulses. Neurological: is normal, no focal findings. IMPRESSION:   Diagnoses and all orders for this visit:    Blurred vision, bilateral  Scheduled for cataract surgery on L eye on 09/30 and R eye on 10/12    Pre-op exam  Cleared for the surgery. No ASA or NSAIDS 5 days before the surgery     Essential hypertension  Take Metoprolol in the morning of the procedure. Mixed hyperlipidemia  Can take Lipitor a night before the surgery. Primary insomnia  Can take Trazodone a night before the surgery.         Brody Guerra MD   9/28/2021 COPD

## 2021-10-28 ENCOUNTER — RX RENEWAL (OUTPATIENT)
Age: 71
End: 2021-10-28

## 2021-12-01 PROCEDURE — G9005: CPT

## 2021-12-01 PROCEDURE — G0297: CPT

## 2022-02-03 NOTE — ASSESSMENT
[FreeTextEntry1] : SANDRA is a 69 year M whom is here today for an annual well check. \par Today, pt reports feeling well but reports burning/shooting type pain That comes and goes and is located on his anterior chest. Denies chest pain, palpitations.Reports that he had noted a rash a few weeks ago which has now resolved but pain continues to persist. Has never had anything like this before.\par \par -PMH: HTN, HLD, COPD (requiring O2 via NC), T2DM, GERD, BPH, Chronic Low back pain. \par -SH: . Disabled. \par \par Follows with the following Physicians: \par -Pulm: Dr. Daugherty\par -Cardio: Dr. Chris\par \par -Vaccines: Needs PPSV 23, PCV 13, Flu, Shingles\par -Lung CA Screenin. Stable. \par -Colonoscopy: Given referral today\par -AA US: Will send at next f/u\par -PSA: Needs at next visit. \par -Yearly Urine Microalbumin: Will Obtain @ next f/u. \par -Diabetic Retinal Exam: Needs\par -Diabetic Foot Exam: 10/2019. Onychomycosis. No ulcers. Neuropathy\par \par -HTN, HLD: Well-controlled on current meds. Follows with Cardio. \par -COPD (requiring O2 via NC): Condition stable. Follows with Pulm. \par -T2DM: 2019 A1c 7.2. Monitors blood sugars regularly and reports Avg 120. \par \par -RTO 6-8 weeks (Discussed vaccines, AA US, labs and urine microalbumin, diabetic eye exam) Complaining of back pain s/p fall, pt stated MD sent her here for surgery.

## 2022-02-21 ENCOUNTER — RX RENEWAL (OUTPATIENT)
Age: 72
End: 2022-02-21

## 2022-02-28 ENCOUNTER — RX CHANGE (OUTPATIENT)
Age: 72
End: 2022-02-28

## 2022-03-01 ENCOUNTER — RX CHANGE (OUTPATIENT)
Age: 72
End: 2022-03-01

## 2022-03-24 ENCOUNTER — APPOINTMENT (OUTPATIENT)
Dept: INTERNAL MEDICINE | Facility: CLINIC | Age: 72
End: 2022-03-24
Payer: MEDICARE

## 2022-03-24 VITALS
HEIGHT: 67 IN | BODY MASS INDEX: 30.13 KG/M2 | WEIGHT: 192 LBS | HEART RATE: 90 BPM | SYSTOLIC BLOOD PRESSURE: 152 MMHG | RESPIRATION RATE: 16 BRPM | OXYGEN SATURATION: 97 % | DIASTOLIC BLOOD PRESSURE: 80 MMHG | TEMPERATURE: 97.2 F

## 2022-03-24 DIAGNOSIS — Z87.19 PERSONAL HISTORY OF OTHER DISEASES OF THE DIGESTIVE SYSTEM: ICD-10-CM

## 2022-03-24 PROCEDURE — 99214 OFFICE O/P EST MOD 30 MIN: CPT | Mod: 25

## 2022-03-24 NOTE — ASSESSMENT
[FreeTextEntry1] : -PMH: T2DM, HTN, HLD, COPD (requiring O2 via NC), GERD, BPH, Chronic Low back pain. \par -SH: . 4 sons & 3 daughter. Disabled. Former Smoker (Quit 2011). Drinks 2-3 beers daily.\par \par SANDRA is a 71 year M whom is here today for f/u HTN, HLD, T2DM\par \par Specialists:\par -Pulm: Dr. Rudolph Aguirre\par -NeuroSx: Dr. Semaus Jones\par -Cardio: Dr. Neal Chris\par \par -F/u labs drawn in office today\par -Further recs pending lab results\par -Still needs to F/u w/ Optho (diabetic eye exam)\par -Still needs to f/u w/ GI (Colonoscopy) \par -Continue F/u w/ NeuroSg (Chronic Low Back Pain)\par -Continue f/u w/ Cardio (HTN, HLD, DM))\par -RTO 4mo for CPE or sooner if needed

## 2022-03-24 NOTE — HISTORY OF PRESENT ILLNESS
[FreeTextEntry1] :  HTN, T2DM [de-identified] : -PMH: T2DM, HTN, HLD, COPD (requiring O2 via NC), GERD, BPH, Chronic Low back pain. \par -SH: . 4 sons & 3 daughter. Disabled. Former Smoker (Quit 2011). Drinks 2-3 beers daily.\par \par SANDRA is a 71 year M whom is here today for f/u HTN, HLD, T2DM\par Today, pt reports feeling well \par Of note, pt is highly non-compliant to recommended follow up \par \par -HTN: Remains on Amlodipine 10mg & Losartan 25mg. Follows w/ Cardio. No reported changes. \par -HLD: Remains on Rosuvastatin 40mg HS. No reported changes. \par -T2DM: Remains on Metformin ER 1000mg QD & Glimpiride 1mg BID. On Aspirin & Statin. (3/2020) A1c 6.8. Non-compliant w/ Optho Eval. No reported changes.\par \par -COPD: Reports uncontrolled on current Inhalers. \par \par -Chronic Low Back Pain: S/p epidural injections with minimal improvement. He continues w/ PT. Follows w/ pain management. He will be following w/ Neurosurg.\par -BPH: Remains on Tamsulosin 0.4mg. Still needs to f/u w/ Uro. No reported changes.

## 2022-03-25 ENCOUNTER — RX RENEWAL (OUTPATIENT)
Age: 72
End: 2022-03-25

## 2022-03-25 ENCOUNTER — NON-APPOINTMENT (OUTPATIENT)
Age: 72
End: 2022-03-25

## 2022-03-25 DIAGNOSIS — R74.8 ABNORMAL LEVELS OF OTHER SERUM ENZYMES: ICD-10-CM

## 2022-03-25 LAB
ALBUMIN SERPL ELPH-MCNC: 4.8 G/DL
ALP BLD-CCNC: 117 U/L
ALT SERPL-CCNC: 22 U/L
ANION GAP SERPL CALC-SCNC: 15 MMOL/L
AST SERPL-CCNC: 26 U/L
BILIRUB SERPL-MCNC: 0.2 MG/DL
BUN SERPL-MCNC: 16 MG/DL
CALCIUM SERPL-MCNC: 10.1 MG/DL
CHLORIDE SERPL-SCNC: 104 MMOL/L
CHOLEST SERPL-MCNC: 159 MG/DL
CO2 SERPL-SCNC: 21 MMOL/L
CREAT SERPL-MCNC: 1.24 MG/DL
CREAT SPEC-SCNC: 139 MG/DL
EGFR: 63 ML/MIN/1.73M2
ESTIMATED AVERAGE GLUCOSE: 171 MG/DL
GLUCOSE SERPL-MCNC: 121 MG/DL
HBA1C MFR BLD HPLC: 7.6 %
HDLC SERPL-MCNC: 54 MG/DL
LDLC SERPL CALC-MCNC: 74 MG/DL
MICROALBUMIN 24H UR DL<=1MG/L-MCNC: 4.5 MG/DL
MICROALBUMIN/CREAT 24H UR-RTO: 32 MG/G
NONHDLC SERPL-MCNC: 105 MG/DL
POTASSIUM SERPL-SCNC: 4.3 MMOL/L
PROT SERPL-MCNC: 7.8 G/DL
SODIUM SERPL-SCNC: 140 MMOL/L
TRIGL SERPL-MCNC: 154 MG/DL

## 2022-04-08 ENCOUNTER — RX RENEWAL (OUTPATIENT)
Age: 72
End: 2022-04-08

## 2022-05-09 ENCOUNTER — APPOINTMENT (OUTPATIENT)
Dept: NEUROSURGERY | Facility: CLINIC | Age: 72
End: 2022-05-09
Payer: MEDICARE

## 2022-05-09 VITALS
BODY MASS INDEX: 29.78 KG/M2 | WEIGHT: 192 LBS | SYSTOLIC BLOOD PRESSURE: 128 MMHG | OXYGEN SATURATION: 97 % | DIASTOLIC BLOOD PRESSURE: 71 MMHG | HEART RATE: 84 BPM | HEIGHT: 67.5 IN | TEMPERATURE: 98.4 F

## 2022-05-09 DIAGNOSIS — M51.26 OTHER INTERVERTEBRAL DISC DISPLACEMENT, LUMBAR REGION: ICD-10-CM

## 2022-05-09 PROCEDURE — 99213 OFFICE O/P EST LOW 20 MIN: CPT

## 2022-05-09 NOTE — PHYSICAL EXAM
[General Appearance - Alert] : alert [General Appearance - In No Acute Distress] : in no acute distress [Oriented To Time, Place, And Person] : oriented to person, place, and time [Impaired Insight] : insight and judgment were intact [Affect] : the affect was normal [Person] : oriented to person [Place] : oriented to place [Time] : oriented to time [Short Term Intact] : short term memory intact [Remote Intact] : remote memory intact [Span Intact] : the attention span was normal [Concentration Intact] : normal concentrating ability [Fluency] : fluency intact [Comprehension] : comprehension intact [Current Events] : adequate knowledge of current events [Past History] : adequate knowledge of personal past history [Vocabulary] : adequate range of vocabulary [Cranial Nerves Optic (II)] : visual acuity intact bilaterally,  pupils equal round and reactive to light [Cranial Nerves Oculomotor (III)] : extraocular motion intact [Cranial Nerves Trigeminal (V)] : facial sensation intact symmetrically [Cranial Nerves Facial (VII)] : face symmetrical [Cranial Nerves Glossopharyngeal (IX)] : tongue and palate midline [Cranial Nerves Accessory (XI - Cranial And Spinal)] : head turning and shoulder shrug symmetric [Cranial Nerves Hypoglossal (XII)] : there was no tongue deviation with protrusion [Motor Tone] : muscle tone was normal in all four extremities [Motor Strength] : muscle strength was normal in all four extremities [No Muscle Atrophy] : normal bulk in all four extremities [Sensation Tactile Decrease] : light touch was intact [Abnormal Walk] : normal gait [Balance] : balance was intact [Past-pointing] : there was no past-pointing [Tremor] : no tremor present [2+] : Patella left 2+

## 2022-05-09 NOTE — REASON FOR VISIT
[Follow-Up: _____] : a [unfilled] follow-up visit [FreeTextEntry1] : SANRDA MCQUEEN is a 70 year old male presents for follow up visit for complaints of lower back and LE pain and surgical discussion. Last seen 12/2021.  No new falls or injuries.   Today he is accompanied by his wife. Past medical history includes emphysema. He mentions his symptoms started about 2006, progressively worsening in nature. Patient reports progressive lumbar region pain with intermittent radiation to L > R buttock/posterior lateral thigh/calf and foot. Patient endorses lower extremity weakness. Pain intensity 8/10. Denies any saddle anesthesia, urinary or bowel dysfunction. Patient has been ambulating with the assistance of a cane for several years at this point. Patient endorses difficulty with ambulation. Patient is currently involved in physical therapy but does not note any improvement as of yet. Patient has been under the care of pain management. No recent treatments secondary to pain management stating he needs neurosurgical evaluation given he has failed multiple pain management treatments. He is managing his symptoms with NSAIDs as needed. No falls or injuries.\par Patient was unable to tolerate physical therapy secondary to wearing masks during exercises which exacerbates his emphysema. \par \par

## 2022-05-09 NOTE — ASSESSMENT
[FreeTextEntry1] : Mr. He presents with above history and imaging. \par Patient has imaging findings consistent with severe lumbar spinal stenosis accountable for his lower extremity radiculopathy.\par Patient will see pulmonology for COPD follow up.\par Repeat MRI lumbar spine to assess for progression of his lumbar spinal stenosis. \par Patient would likely merit consideration of surgical intervention if symptoms do not improve with conservative measures.\par Patient will consider trying physical therapy for improvement of his lower back and LE strength- at home is recommended secondary to his COPD issues. \par Patient knows to call the office if there are any new or worsening symptoms.\par \par \par I, Dr. Seamus Jones, personally performed the evaluation and management (E/M) services for this patient.  That E/M includes assessment of the initial examination, assessing the pertinent medical and surgical history, and establishing the plan of care.  At the time of the visit, my ACP, Yudith Lerma, actively participated in the evaluation and management services for this patient to be followed going forward in accordance with the plan documented in the clinical note.\par \par \par

## 2022-05-16 ENCOUNTER — RX RENEWAL (OUTPATIENT)
Age: 72
End: 2022-05-16

## 2022-05-18 ENCOUNTER — APPOINTMENT (OUTPATIENT)
Dept: PULMONOLOGY | Facility: CLINIC | Age: 72
End: 2022-05-18

## 2022-06-23 ENCOUNTER — APPOINTMENT (OUTPATIENT)
Dept: PULMONOLOGY | Facility: CLINIC | Age: 72
End: 2022-06-23

## 2022-07-13 ENCOUNTER — RX RENEWAL (OUTPATIENT)
Age: 72
End: 2022-07-13

## 2022-07-22 ENCOUNTER — RX RENEWAL (OUTPATIENT)
Age: 72
End: 2022-07-22

## 2022-07-25 ENCOUNTER — RX RENEWAL (OUTPATIENT)
Age: 72
End: 2022-07-25

## 2022-08-08 ENCOUNTER — RX RENEWAL (OUTPATIENT)
Age: 72
End: 2022-08-08

## 2022-08-16 ENCOUNTER — RX RENEWAL (OUTPATIENT)
Age: 72
End: 2022-08-16

## 2022-08-19 ENCOUNTER — APPOINTMENT (OUTPATIENT)
Dept: INTERNAL MEDICINE | Facility: CLINIC | Age: 72
End: 2022-08-19

## 2022-08-19 VITALS
WEIGHT: 191 LBS | DIASTOLIC BLOOD PRESSURE: 80 MMHG | SYSTOLIC BLOOD PRESSURE: 134 MMHG | HEART RATE: 102 BPM | BODY MASS INDEX: 29.63 KG/M2 | HEIGHT: 67.5 IN | OXYGEN SATURATION: 97 % | TEMPERATURE: 97.3 F

## 2022-08-19 DIAGNOSIS — R80.9 PROTEINURIA, UNSPECIFIED: ICD-10-CM

## 2022-08-19 DIAGNOSIS — Z12.11 ENCOUNTER FOR SCREENING FOR MALIGNANT NEOPLASM OF COLON: ICD-10-CM

## 2022-08-19 DIAGNOSIS — N40.0 BENIGN PROSTATIC HYPERPLASIA WITHOUT LOWER URINARY TRACT SYMPMS: ICD-10-CM

## 2022-08-19 DIAGNOSIS — R60.0 LOCALIZED EDEMA: ICD-10-CM

## 2022-08-19 PROCEDURE — 36415 COLL VENOUS BLD VENIPUNCTURE: CPT

## 2022-08-19 PROCEDURE — G0439: CPT

## 2022-08-19 RX ORDER — ASPIRIN ENTERIC COATED TABLETS 81 MG 81 MG/1
81 TABLET, DELAYED RELEASE ORAL
Refills: 0 | Status: ACTIVE | COMMUNITY
Start: 2018-09-17

## 2022-08-19 RX ORDER — MULTIVITAMIN
TABLET ORAL
Refills: 0 | Status: DISCONTINUED | COMMUNITY
End: 2022-08-19

## 2022-08-20 LAB
BASOPHILS # BLD AUTO: 0.07 K/UL
BASOPHILS NFR BLD AUTO: 0.9 %
CHOLEST SERPL-MCNC: 163 MG/DL
CREAT SPEC-SCNC: 279 MG/DL
EOSINOPHIL # BLD AUTO: 0.25 K/UL
EOSINOPHIL NFR BLD AUTO: 3.4 %
HCT VFR BLD CALC: 43.9 %
HDLC SERPL-MCNC: 65 MG/DL
HGB BLD-MCNC: 13.4 G/DL
IMM GRANULOCYTES NFR BLD AUTO: 0.3 %
LDLC SERPL CALC-MCNC: 79 MG/DL
LYMPHOCYTES # BLD AUTO: 2.53 K/UL
LYMPHOCYTES NFR BLD AUTO: 34 %
MAN DIFF?: NORMAL
MCHC RBC-ENTMCNC: 29.3 PG
MCHC RBC-ENTMCNC: 30.5 GM/DL
MCV RBC AUTO: 96.1 FL
MICROALBUMIN 24H UR DL<=1MG/L-MCNC: 3.7 MG/DL
MICROALBUMIN/CREAT 24H UR-RTO: 13 MG/G
MONOCYTES # BLD AUTO: 0.68 K/UL
MONOCYTES NFR BLD AUTO: 9.1 %
NEUTROPHILS # BLD AUTO: 3.9 K/UL
NEUTROPHILS NFR BLD AUTO: 52.3 %
NONHDLC SERPL-MCNC: 98 MG/DL
PLATELET # BLD AUTO: 299 K/UL
PSA SERPL-MCNC: 0.98 NG/ML
RBC # BLD: 4.57 M/UL
RBC # FLD: 14.6 %
TRIGL SERPL-MCNC: 95 MG/DL
TSH SERPL-ACNC: 1.6 UIU/ML
VIT B12 SERPL-MCNC: 681 PG/ML
WBC # FLD AUTO: 7.45 K/UL

## 2022-08-22 ENCOUNTER — NON-APPOINTMENT (OUTPATIENT)
Age: 72
End: 2022-08-22

## 2022-08-22 LAB
ALBUMIN SERPL ELPH-MCNC: 4.9 G/DL
ALP BLD-CCNC: 104 U/L
ALT SERPL-CCNC: 24 U/L
ANION GAP SERPL CALC-SCNC: 15 MMOL/L
AST SERPL-CCNC: 31 U/L
BILIRUB SERPL-MCNC: 0.2 MG/DL
BUN SERPL-MCNC: 13 MG/DL
CALCIUM SERPL-MCNC: 10.1 MG/DL
CHLORIDE SERPL-SCNC: 106 MMOL/L
CO2 SERPL-SCNC: 24 MMOL/L
CREAT SERPL-MCNC: 1.26 MG/DL
EGFR: 61 ML/MIN/1.73M2
ESTIMATED AVERAGE GLUCOSE: 154 MG/DL
GLUCOSE SERPL-MCNC: 138 MG/DL
HBA1C MFR BLD HPLC: 7 %
POTASSIUM SERPL-SCNC: 4.8 MMOL/L
PROT SERPL-MCNC: 7.9 G/DL
SODIUM SERPL-SCNC: 144 MMOL/L

## 2022-08-22 NOTE — ASSESSMENT
[FreeTextEntry1] : -PMH: T2DM, HTN, HLD, COPD, GERD, BPH, Chronic Low back pain. \par -SH: . 4 sons & 3 daughter. Disabled. Former Smoker (Quit 2011). Drinks 2-3 beers daily.\par \par SANDRA is a 72 year M whom is here today for an annual well visit\par \par Specialists:\par -Pulm: Dr. Rudolph Aguirre\par -NeuroSx: Dr. Seamus Jones\par -Cardio: Dr. Neal Chris ([phone])\par \par -F/u labs drawn in office today\par -Further recs pending lab results\par -F/u FIT-DNA\par -F/u CT Chest Lung CA SCreening\par -Over due for for f/u Pulm\par -Still needs to F/u w/ Optho (diabetic eye exam)\par -Still needs to f/u w/ GI (Colonoscopy) \par -Continue F/u w/ NeuroSg (Chronic Low Back Pain)\par -Continue f/u w/ Cardio (HTN, HLD, DM))\par -RTO January for routine f/u & labs or sooner if needed

## 2022-08-22 NOTE — HEALTH RISK ASSESSMENT
[Very Good] : ~his/her~  mood as very good [Yes] : Yes [4 or more  times a week (4 pts)] : 4 or more  times a week (4 points) [3 or 4 (1 pt)] : 3 or 4  (1 point) [Never (0 pts)] : Never (0 points) [No] : In the past 12 months have you used drugs other than those required for medical reasons? No [No falls in past year] : Patient reported no falls in the past year [0] : 2) Feeling down, depressed, or hopeless: Not at all (0) [2] : 2 [Patient declined Retinal Exam] : Patient declined Retinal Exam. [Patient declined colonoscopy] : Patient declined colonoscopy [HIV test declined] : HIV test declined [Hepatitis C test declined] : Hepatitis C test declined [None] : None [With Family] : lives with family [Retired] : retired [] :  [Fully functional (bathing, dressing, toileting, transferring, walking, feeding)] : Fully functional (bathing, dressing, toileting, transferring, walking, feeding) [Fully functional (using the telephone, shopping, preparing meals, housekeeping, doing laundry, using] : Fully functional and needs no help or supervision to perform IADLs (using the telephone, shopping, preparing meals, housekeeping, doing laundry, using transportation, managing medications and managing finances) [Reviewed no changes] : Reviewed, no changes [Former] : Former [20 or more] : 20 or more [PHQ-2 Negative - No further assessment needed] : PHQ-2 Negative - No further assessment needed [YearQuit] : 2011 [Audit-CScore] : 5 [MXX0Azalt] : 0 [LowDoseCTScan] : 09/20 [EyeExamDate] : 08/22 [Change in mental status noted] : No change in mental status noted [Reports changes in hearing] : Reports no changes in hearing [Reports changes in vision] : Reports no changes in vision [ColonoscopyDate] : 08/22 [AdvancecareDate] : 08/22

## 2022-08-22 NOTE — HISTORY OF PRESENT ILLNESS
[FreeTextEntry1] : Annual well visit [de-identified] : -PMH: T2DM, HTN, HLD, COPD, GERD, BPH, Chronic Low back pain. \par -SH: . 4 sons & 3 daughter. Disabled. Former Smoker (Quit ). Drinks 2-3 beers daily.\par \par SANDRA is a 72 year M whom is here today for an annual well visit\par \par Specialists:\par -Pulm: Dr. Rudolph Aguirre\par -NeuroSx: Dr. Seamus Jones\par -Cardio: Dr. Neal Chris ([phone])\par \par -Vaccines: Needs Shingles, COVID, Prevnar 20\par -Colonoscopy: Declines\par -Lung CT CA Screenin2020\par -FH of Prostate, Colon, breast or ovarian CA: Mother had Breast CA\par \par -HTN: Remains on Amlodipine 10mg & Losartan 25mg. Follows w/ Cardio. No reported changes. \par -HLD: Remains on Rosuvastatin 40mg HS. No reported changes. \par -T2DM: Remains on Metformin ER 1000mg QD (Not 2000mg as instructed) & Glimpiride 1mg BID. On ASA & Statin. Compliant w/ BG monitoring. (3/2022) A1c 7.6. Non-compliant w/ Optho Eval. No reported changes.\par \par -COPD: Managed on current Inhalers. Non-compliant to f/u w/ Pulm\par \par -Chronic Low Back Pain: S/p epidural injections with minimal improvement. He continues w/ PT. Follows w/ pain management. He will be following w/ Neurosurg.\par -BPH: Remains on Tamsulosin 0.4mg. Still needs to f/u w/ Uro. No reported changes.

## 2022-08-25 ENCOUNTER — APPOINTMENT (OUTPATIENT)
Dept: ULTRASOUND IMAGING | Facility: CLINIC | Age: 72
End: 2022-08-25

## 2022-08-25 ENCOUNTER — OUTPATIENT (OUTPATIENT)
Dept: OUTPATIENT SERVICES | Facility: HOSPITAL | Age: 72
LOS: 1 days | End: 2022-08-25

## 2022-08-25 ENCOUNTER — NON-APPOINTMENT (OUTPATIENT)
Age: 72
End: 2022-08-25

## 2022-08-25 DIAGNOSIS — R60.0 LOCALIZED EDEMA: ICD-10-CM

## 2022-08-25 PROCEDURE — 93971 EXTREMITY STUDY: CPT | Mod: 26,RT

## 2022-08-30 ENCOUNTER — APPOINTMENT (OUTPATIENT)
Dept: PULMONOLOGY | Facility: CLINIC | Age: 72
End: 2022-08-30

## 2022-08-30 VITALS
OXYGEN SATURATION: 97 % | HEART RATE: 102 BPM | DIASTOLIC BLOOD PRESSURE: 68 MMHG | RESPIRATION RATE: 16 BRPM | SYSTOLIC BLOOD PRESSURE: 134 MMHG

## 2022-08-30 VITALS — WEIGHT: 185 LBS | HEIGHT: 66 IN | BODY MASS INDEX: 29.73 KG/M2

## 2022-08-30 DIAGNOSIS — Z87.891 PERSONAL HISTORY OF NICOTINE DEPENDENCE: ICD-10-CM

## 2022-08-30 PROCEDURE — 99214 OFFICE O/P EST MOD 30 MIN: CPT | Mod: 25

## 2022-08-30 PROCEDURE — 85018 HEMOGLOBIN: CPT | Mod: QW

## 2022-08-30 PROCEDURE — 94729 DIFFUSING CAPACITY: CPT

## 2022-08-30 PROCEDURE — G0296 VISIT TO DETERM LDCT ELIG: CPT

## 2022-08-30 PROCEDURE — 94010 BREATHING CAPACITY TEST: CPT

## 2022-08-30 PROCEDURE — 94727 GAS DIL/WSHOT DETER LNG VOL: CPT

## 2022-09-07 ENCOUNTER — NON-APPOINTMENT (OUTPATIENT)
Age: 72
End: 2022-09-07

## 2022-09-07 VITALS — BODY MASS INDEX: 29.73 KG/M2 | WEIGHT: 185 LBS | HEIGHT: 66 IN

## 2022-09-07 DIAGNOSIS — Z87.891 PERSONAL HISTORY OF NICOTINE DEPENDENCE: ICD-10-CM

## 2022-09-07 NOTE — HISTORY OF PRESENT ILLNESS
[Former] : former smoker [_____ pack-years] : [unfilled] pack-years [TextBox_13] : Referred by Dr. Georgie Hansen\par \par Mr. MCQUEEN is a 72 year old male with a history of HTN, high cholesterol, COPD and emphysema.   Reviewed and confirmed that the patient meets screening eligibility criteria:\par \par 72 years old \par \par Smoking Status: Former smoker \par \par Number of pack(s) per day: 1\par Number of years smoked: 43\par Number of pack years smokin\par \par Number of years since quitting smoking: 10\par Quit year: \par \par No symptoms of lung cancer, including new cough, change in cough, hemoptysis, and unintentional weight loss.\par \par No personal history of lung cancer.  Lung cancer in a first degree relative, his father.  No any history of occupational exposures. [TextBox_6] : 1 [TextBox_8] : 43 [TextBox_10] : 2012

## 2022-09-09 ENCOUNTER — APPOINTMENT (OUTPATIENT)
Dept: CT IMAGING | Facility: CLINIC | Age: 72
End: 2022-09-09

## 2022-09-21 ENCOUNTER — RX RENEWAL (OUTPATIENT)
Age: 72
End: 2022-09-21

## 2022-11-01 ENCOUNTER — APPOINTMENT (OUTPATIENT)
Dept: PULMONOLOGY | Facility: CLINIC | Age: 72
End: 2022-11-01

## 2022-11-08 ENCOUNTER — APPOINTMENT (OUTPATIENT)
Dept: INTERNAL MEDICINE | Facility: CLINIC | Age: 72
End: 2022-11-08

## 2022-11-08 VITALS
HEIGHT: 66 IN | BODY MASS INDEX: 28.45 KG/M2 | HEART RATE: 87 BPM | SYSTOLIC BLOOD PRESSURE: 116 MMHG | OXYGEN SATURATION: 97 % | WEIGHT: 177 LBS | DIASTOLIC BLOOD PRESSURE: 81 MMHG | TEMPERATURE: 97.4 F

## 2022-11-08 DIAGNOSIS — M25.561 PAIN IN RIGHT KNEE: ICD-10-CM

## 2022-11-08 PROCEDURE — 99213 OFFICE O/P EST LOW 20 MIN: CPT | Mod: 25

## 2022-11-08 PROCEDURE — G0444 DEPRESSION SCREEN ANNUAL: CPT | Mod: 59

## 2022-11-08 NOTE — HISTORY OF PRESENT ILLNESS
[FreeTextEntry8] : SANDRA is a 72 year M PMH: T2DM, HTN, HLD, COPD, GERD, BPH, Chronic Low back pain. \par Patient states that he feels extremely depressed. He is here with his grief counselor which is assisting with history. \par His wife passed away on 10/2022. \favio Admits of poor appetite, fatigue, crying spells, no energy to do the things he enjoyed to do in the past, weight loss, feelings of guilt, trouble sleeping, inability to concentrate. \favio Has not been eating and this is a concern for him as he is diabetic and take diabetes medications. \favio Had a fall about three days ago and R knee is swollen He is able to bear weight, did not hit his head and denies LOC. \par His children live in [place] and [place], does not communicate often with them. Has a sister nearby. \favio Denies suicidal ideation, states that he is a [employer] man and does not believe in taking away his own life. \favio

## 2022-11-08 NOTE — PHYSICAL EXAM
[Normal] : normal rate, regular rhythm, normal S1 and S2 and no murmur heard [Coordination Grossly Intact] : coordination grossly intact [No Focal Deficits] : no focal deficits [Normal Gait] : normal gait [Speech Grossly Normal] : speech grossly normal [Alert and Oriented x3] : oriented to person, place, and time [Normal Insight/Judgement] : insight and judgment were intact [de-identified] : R knee swelling  [de-identified] : Depressed mood

## 2022-11-08 NOTE — HEALTH RISK ASSESSMENT
[3] : 2) Feeling down, depressed, or hopeless for nearly every day (3) [PHQ-2 Positive] : PHQ-2 Positive [Nearly Every Day (3)] : 8.) Moving or speaking so slowly that other people could have noticed, or the opposite, moving or speaking faster than usual? Nearly every day [Severe] : severity of depression is severe [Extremely Difficult] : How difficult have these problems made it for you to do your work, take care of things at home, or get along with people? Extremely difficult [PHQ-9 Positive] : PHQ-9 Positive [I have developed a follow-up plan documented below in the note.] : I have developed a follow-up plan documented below in the note. [SNE3Jutnt] : 6 [[id number]] : 24

## 2022-11-08 NOTE — ASSESSMENT
[FreeTextEntry1] : SANDRA is a 72 year M PMH: T2DM, HTN, HLD, COPD, GERD, BPH, Chronic Low back pain. \par Patient states that he feels extremely depressed. He is here with his grief counselor which is assisting with history. \par His wife passed away on 10/2022. \par Admits of poor appetite, fatigue, crying spells, no energy to do the things he enjoyed to do in the past, weight loss, feelings of guilt, trouble sleeping, inability to concentrate. \favio Has not been eating and this is a concern for him as he is diabetic and take diabetes medications. \par Had a fall about three days ago and R knee is swollen He is able to bear weight, did not hit his head and denies LOC. \par His children live in [place] and [place], does not communicate often with them. Has a sister nearby. \par Denies suicidal ideation, states that he is a [employer] man and does not believe in taking away his own life. \par \par MDD \par Although still within the time frame of grief, acute stress reaction patient is exhibiting early symptoms of depression, will trial him on mirtazapine as he is in much need of sleeping. States he spends the whole night thinking about his belated wife. This medication might also help his appetite. \par Educated about possible side effects including thoughts of suicide, if happens he is to discontinue medication immediately and call office or visit nearest ED. Expectations were set regarding efficacy of medication: needs to be used daily and might take more than 6 weeks in order to work. Patient will follow up in 6 weeks in order to assess response to treatment. Therapy is also recommended and resources provided.\par Educated about sedative effects of this medication, he is to be careful in the morning when waking up as he can wake up feeling lightheaded or drowsy, he is to get up in a stepwise manner and if he develops any side effects he will call office to let me know. \par \par R knee swelling \par Able to bear weight, no concerns at the moment of fractures \par Advised to apply cold compresses and provided ace bandage \par May use ibuprofen for pain \par \par RTO 6 WEEKS

## 2022-11-14 RX ORDER — MIRTAZAPINE 7.5 MG/1
7.5 TABLET, FILM COATED ORAL
Qty: 90 | Refills: 0 | Status: DISCONTINUED | COMMUNITY
Start: 2022-11-08 | End: 2022-11-14

## 2022-11-16 ENCOUNTER — APPOINTMENT (OUTPATIENT)
Dept: INTERNAL MEDICINE | Facility: CLINIC | Age: 72
End: 2022-11-16

## 2022-11-24 ENCOUNTER — EMERGENCY (EMERGENCY)
Facility: HOSPITAL | Age: 72
LOS: 1 days | Discharge: HOME | End: 2022-11-24
Attending: STUDENT IN AN ORGANIZED HEALTH CARE EDUCATION/TRAINING PROGRAM
Payer: MEDICARE

## 2022-11-24 VITALS
HEART RATE: 91 BPM | OXYGEN SATURATION: 96 % | SYSTOLIC BLOOD PRESSURE: 134 MMHG | RESPIRATION RATE: 16 BRPM | TEMPERATURE: 98 F | DIASTOLIC BLOOD PRESSURE: 72 MMHG

## 2022-11-24 VITALS
RESPIRATION RATE: 24 BRPM | HEART RATE: 83 BPM | DIASTOLIC BLOOD PRESSURE: 76 MMHG | TEMPERATURE: 98 F | OXYGEN SATURATION: 98 % | SYSTOLIC BLOOD PRESSURE: 126 MMHG

## 2022-11-24 LAB
ALBUMIN SERPL ELPH-MCNC: 4 G/DL — SIGNIFICANT CHANGE UP (ref 3.3–5.2)
ALP SERPL-CCNC: 85 U/L — SIGNIFICANT CHANGE UP (ref 40–120)
ALT FLD-CCNC: 22 U/L — SIGNIFICANT CHANGE UP
ANION GAP SERPL CALC-SCNC: 13 MMOL/L — SIGNIFICANT CHANGE UP (ref 5–17)
AST SERPL-CCNC: 36 U/L — SIGNIFICANT CHANGE UP
BASOPHILS # BLD AUTO: 0.04 K/UL — SIGNIFICANT CHANGE UP (ref 0–0.2)
BASOPHILS NFR BLD AUTO: 0.4 % — SIGNIFICANT CHANGE UP (ref 0–2)
BILIRUB SERPL-MCNC: <0.2 MG/DL — LOW (ref 0.4–2)
BUN SERPL-MCNC: 13.1 MG/DL — SIGNIFICANT CHANGE UP (ref 8–20)
CALCIUM SERPL-MCNC: 9.1 MG/DL — SIGNIFICANT CHANGE UP (ref 8.4–10.5)
CHLORIDE SERPL-SCNC: 100 MMOL/L — SIGNIFICANT CHANGE UP (ref 96–108)
CO2 SERPL-SCNC: 24 MMOL/L — SIGNIFICANT CHANGE UP (ref 22–29)
CREAT SERPL-MCNC: 1.12 MG/DL — SIGNIFICANT CHANGE UP (ref 0.5–1.3)
EGFR: 71 ML/MIN/1.73M2 — SIGNIFICANT CHANGE UP
EOSINOPHIL # BLD AUTO: 0.31 K/UL — SIGNIFICANT CHANGE UP (ref 0–0.5)
EOSINOPHIL NFR BLD AUTO: 3.4 % — SIGNIFICANT CHANGE UP (ref 0–6)
GLUCOSE SERPL-MCNC: 74 MG/DL — SIGNIFICANT CHANGE UP (ref 70–99)
HCT VFR BLD CALC: 39.4 % — SIGNIFICANT CHANGE UP (ref 39–50)
HGB BLD-MCNC: 13.1 G/DL — SIGNIFICANT CHANGE UP (ref 13–17)
IMM GRANULOCYTES NFR BLD AUTO: 0.2 % — SIGNIFICANT CHANGE UP (ref 0–0.9)
LYMPHOCYTES # BLD AUTO: 2.47 K/UL — SIGNIFICANT CHANGE UP (ref 1–3.3)
LYMPHOCYTES # BLD AUTO: 27.4 % — SIGNIFICANT CHANGE UP (ref 13–44)
MAGNESIUM SERPL-MCNC: 2.1 MG/DL — SIGNIFICANT CHANGE UP (ref 1.6–2.6)
MCHC RBC-ENTMCNC: 30.1 PG — SIGNIFICANT CHANGE UP (ref 27–34)
MCHC RBC-ENTMCNC: 33.2 GM/DL — SIGNIFICANT CHANGE UP (ref 32–36)
MCV RBC AUTO: 90.6 FL — SIGNIFICANT CHANGE UP (ref 80–100)
MONOCYTES # BLD AUTO: 0.78 K/UL — SIGNIFICANT CHANGE UP (ref 0–0.9)
MONOCYTES NFR BLD AUTO: 8.7 % — SIGNIFICANT CHANGE UP (ref 2–14)
NEUTROPHILS # BLD AUTO: 5.39 K/UL — SIGNIFICANT CHANGE UP (ref 1.8–7.4)
NEUTROPHILS NFR BLD AUTO: 59.9 % — SIGNIFICANT CHANGE UP (ref 43–77)
NT-PROBNP SERPL-SCNC: 14 PG/ML — SIGNIFICANT CHANGE UP (ref 0–300)
PLATELET # BLD AUTO: 290 K/UL — SIGNIFICANT CHANGE UP (ref 150–400)
POTASSIUM SERPL-MCNC: 4 MMOL/L — SIGNIFICANT CHANGE UP (ref 3.5–5.3)
POTASSIUM SERPL-SCNC: 4 MMOL/L — SIGNIFICANT CHANGE UP (ref 3.5–5.3)
PROT SERPL-MCNC: 7.6 G/DL — SIGNIFICANT CHANGE UP (ref 6.6–8.7)
RAPID RVP RESULT: SIGNIFICANT CHANGE UP
RBC # BLD: 4.35 M/UL — SIGNIFICANT CHANGE UP (ref 4.2–5.8)
RBC # FLD: 14.2 % — SIGNIFICANT CHANGE UP (ref 10.3–14.5)
SARS-COV-2 RNA SPEC QL NAA+PROBE: SIGNIFICANT CHANGE UP
SODIUM SERPL-SCNC: 137 MMOL/L — SIGNIFICANT CHANGE UP (ref 135–145)
TROPONIN T SERPL-MCNC: <0.01 NG/ML — SIGNIFICANT CHANGE UP (ref 0–0.06)
WBC # BLD: 9.01 K/UL — SIGNIFICANT CHANGE UP (ref 3.8–10.5)
WBC # FLD AUTO: 9.01 K/UL — SIGNIFICANT CHANGE UP (ref 3.8–10.5)

## 2022-11-24 PROCEDURE — 71045 X-RAY EXAM CHEST 1 VIEW: CPT | Mod: 26

## 2022-11-24 PROCEDURE — 85025 COMPLETE CBC W/AUTO DIFF WBC: CPT

## 2022-11-24 PROCEDURE — 82962 GLUCOSE BLOOD TEST: CPT

## 2022-11-24 PROCEDURE — 36415 COLL VENOUS BLD VENIPUNCTURE: CPT

## 2022-11-24 PROCEDURE — 84484 ASSAY OF TROPONIN QUANT: CPT

## 2022-11-24 PROCEDURE — 80053 COMPREHEN METABOLIC PANEL: CPT

## 2022-11-24 PROCEDURE — 96360 HYDRATION IV INFUSION INIT: CPT

## 2022-11-24 PROCEDURE — 83880 ASSAY OF NATRIURETIC PEPTIDE: CPT

## 2022-11-24 PROCEDURE — 99285 EMERGENCY DEPT VISIT HI MDM: CPT

## 2022-11-24 PROCEDURE — 71045 X-RAY EXAM CHEST 1 VIEW: CPT

## 2022-11-24 PROCEDURE — 0225U NFCT DS DNA&RNA 21 SARSCOV2: CPT

## 2022-11-24 PROCEDURE — 93005 ELECTROCARDIOGRAM TRACING: CPT

## 2022-11-24 PROCEDURE — 83735 ASSAY OF MAGNESIUM: CPT

## 2022-11-24 PROCEDURE — 99285 EMERGENCY DEPT VISIT HI MDM: CPT | Mod: 25

## 2022-11-24 PROCEDURE — 93010 ELECTROCARDIOGRAM REPORT: CPT

## 2022-11-24 RX ORDER — SODIUM CHLORIDE 9 MG/ML
1000 INJECTION INTRAMUSCULAR; INTRAVENOUS; SUBCUTANEOUS ONCE
Refills: 0 | Status: COMPLETED | OUTPATIENT
Start: 2022-11-24 | End: 2022-11-24

## 2022-11-24 RX ADMIN — SODIUM CHLORIDE 1000 MILLILITER(S): 9 INJECTION INTRAMUSCULAR; INTRAVENOUS; SUBCUTANEOUS at 20:30

## 2022-11-24 NOTE — ED ADULT NURSE NOTE - CADM POA PRESS ULCER
Xerosis Normal Treatment: I recommended application of Cetaphil or CeraVe numerous times a day going to bed to all dry areas. No

## 2022-11-24 NOTE — ED PROVIDER NOTE - CLINICAL SUMMARY MEDICAL DECISION MAKING FREE TEXT BOX
Patient is a 71 yo male with PMHx DM, COPD not on home O2, emphysema, HTN, pancreatitis presenting with lightheadedness and SOB. Patient states he felt lightheaded and SOB while walking 1 hour PTA requiring him to sit down. Patient symptoms resolving at this time. Denies dysphagia, dysarthria, syncope, n/v, CP, dizziness, difficulty ambulating, fall. Patient states he has not been eating as well since his wife passed away recently. VSS, tired appearing, mild bilateral expiratory wheezes, belly soft nontender, no peripheral edema, no FND, moving all extremities equally. Patient POC glucose in the 60s, symptoms likely due to hypoglycemia, will hydrate and feed, check labs, r/o electrolyte abnormalities, check troponin, r/o infection with RVP CXR, reassess.

## 2022-11-24 NOTE — ED PROVIDER NOTE - PATIENT PORTAL LINK FT
You can access the FollowMyHealth Patient Portal offered by [facility] by registering at the following website: [email or web address]. By joining [employer]’s FollowMyHealth portal, you will also be able to view your health information using other applications (apps) compatible with our system.

## 2022-11-24 NOTE — ED PROVIDER NOTE - NSFOLLOWUPINSTRUCTIONS_ED_ALL_ED_FT
Hypoglycemia      Hypoglycemia occurs when the level of sugar (glucose) in the blood is too low. Hypoglycemia can happen in people who have or do not have diabetes. It can develop quickly, and it can be a medical emergency. For most people, a blood glucose level below 70 mg/dL (3.9 mmol/L) is considered hypoglycemia.    Glucose is a type of sugar that provides the body's main source of energy. Certain hormones (insulin and glucagon) control the level of glucose in the blood. Insulin lowers blood glucose, and glucagon raises blood glucose. Hypoglycemia can result from having too much insulin in the bloodstream, or from not eating enough food that contains glucose. You may also have reactive hypoglycemia, which happens within 4 hours after eating a meal.      What are the causes?    Hypoglycemia occurs most often in people who have diabetes and may be caused by:  •Diabetes medicine.      •Not eating enough, or not eating often enough.      •Increased physical activity.      •Drinking alcohol on an empty stomach.      If you do not have diabetes, hypoglycemia may be caused by:  •A tumor in the pancreas.      •Not eating enough, or not eating for long periods at a time (fasting).      •A severe infection or illness.      •Problems after having bariatric surgery.      •Organ failure, such as kidney or liver failure.      •Certain medicines.        What increases the risk?    Hypoglycemia is more likely to develop in people who:  •Have diabetes and take medicines to lower blood glucose.      •Abuse alcohol.      •Have a severe illness.        What are the signs or symptoms?    Symptoms vary depending on whether the condition is mild, moderate, or severe.    Mild hypoglycemia     •Hunger.      •Sweating and feeling clammy.      •Dizziness or feeling light-headed.      •Sleepiness or restless sleep.      •Nausea.      •Increased heart rate.      •Headache.      •Blurry vision.      •Mood changes, such as irritability or anxiety.      •Tingling or numbness around the mouth, lips, or tongue.      Moderate hypoglycemia     •Confusion and poor judgment.      •Behavior changes.      •Weakness.      •Irregular heartbeat.      •A change in coordination.      Severe hypoglycemia     Severe hypoglycemia is a medical emergency. It can cause:  •Fainting.      •Seizures.      •Loss of consciousness (coma).      •Death.        How is this diagnosed?    Hypoglycemia is diagnosed with a blood test to measure your blood glucose level. This blood test is done while you are having symptoms. Your health care provider may also do a physical exam and review your medical history.      How is this treated?    This condition can be treated by immediately eating or drinking something that contains sugar with 15 grams of fast-acting carbohydrate, such as:  •4 oz (120 mL) of fruit juice.      •4 oz (120 mL) of regular soda (not diet soda).      •Several pieces of hard candy. Check food labels to find out how many pieces to eat for 15 grams.      •1 Tbsp (15 mL) of sugar or honey.      •4 glucose tablets.      •1 tube of glucose gel.        Treating hypoglycemia if you have diabetes  Hands showing right hand using lancet pen on left ring finger, with glucometer in background.  If you are alert and able to swallow safely, follow the 15:15 rule:•Take 15 grams of a fast-acting carbohydrate. Talk with your health care provider about how much you should take. Options for getting 15 grams of fast-acting carbohydrate include:  •Glucose tablets (take 4 tablets).      •Several pieces of hard candy. Check food labels to find out how many pieces to eat for 15 grams.      •4 oz (120 mL) of fruit juice.      •4 oz (120 mL) of regular soda (not diet soda).      •1 Tbsp (15 mL) of sugar or honey.      •1 tube of glucose gel.        •Check your blood glucose 15 minutes after you take the carbohydrate.      •If the repeat blood glucose level is still at or below 70 mg/dL (3.9 mmol/L), take 15 grams of a carbohydrate again.      •If your blood glucose level does not increase above 70 mg/dL (3.9 mmol/L) after 3 tries, seek emergency medical care.      •After your blood glucose level returns to normal, eat a meal or a snack within 1 hour.      Treating severe hypoglycemia    Severe hypoglycemia is when your blood glucose level is below 54 mg/dL (3 mmol/L). Severe hypoglycemia is a medical emergency. Get medical help right away.    If you have severe hypoglycemia and you cannot eat or drink, you will need to be given glucagon. A family member or close friend should learn how to check your blood glucose and how to give you glucagon. Ask your health care provider if you need to have an emergency glucagon kit available.    Severe hypoglycemia may need to be treated in a hospital. The treatment may include getting glucose through an IV. You may also need treatment for the cause of your hypoglycemia.      Follow these instructions at home:  Medical alert bracelet.   General instructions     •Take over-the-counter and prescription medicines only as told by your health care provider.      •Monitor your blood glucose as told by your health care provider.    •If you drink alcohol:•Limit how much you have to:  •0–1 drink a day for women who are not pregnant.      •0–2 drinks a day for men.         •Know how much alcohol is in your drink. In the [place] one drink equals one 12 oz bottle of beer (355 mL), one 5 oz glass of wine (148 mL), or one 1½ oz glass of hard liquor (44 mL).      •Be sure to eat food along with drinking alcohol.      •Be aware that alcohol is absorbed quickly and may have lingering effects that may result in hypoglycemia later. Be sure to do ongoing glucose monitoring.        •Keep all follow-up visits. This is important.      If you have diabetes:     •Always have a fast-acting carbohydrate (15 grams) option with you to treat low blood glucose.    •Follow your diabetes management plan as directed by your health care provider. Make sure you:  •Know the symptoms of hypoglycemia. It is important to treat it right away to prevent it from becoming severe.      •Check your blood glucose as often as told. Always check before and after exercise.      •Always check your blood glucose before you drive a motorized vehicle.      •Take your medicines as told.      •Follow your meal plan. Eat on time, and do not skip meals.        •Share your diabetes management plan with people in your workplace, school, and household.      •Carry a medical alert card or wear medical alert jewelry.        Where to find more information    •American Diabetes Association: www.diabetes.org        Contact a health care provider if:    •You have problems keeping your blood glucose in your target range.      •You have frequent episodes of hypoglycemia.        Get help right away if:    •You continue to have hypoglycemia symptoms after eating or drinking something that contains 15 grams of fast-acting carbohydrate, and you cannot get your blood glucose above 70 mg/dL (3.9 mmol/L) while following the 15:15 rule.      •Your blood glucose is below 54 mg/dL (3 mmol/L).      •You have a seizure.      •You faint.      These symptoms may represent a serious problem that is an emergency. Do not wait to see if the symptoms will go away. Get medical help right away. Call your local emergency services (911 in the [place] Do not drive yourself to the hospital.       Summary    •Hypoglycemia occurs when the level of sugar (glucose) in the blood is too low.      •Hypoglycemia can happen in people who have or do not have diabetes. It can develop quickly, and it can be a medical emergency.      •Make sure you know the symptoms of hypoglycemia and how to treat it.      •Always have a fast-acting carbohydrate option with you to treat low blood sugar.      This information is not intended to replace advice given to you by your health care provider. Make sure you discuss any questions you have with your health care provider.

## 2022-11-24 NOTE — ED ADULT NURSE NOTE - OBJECTIVE STATEMENT
pt is a 72y male, ambulatory, aox4.  pt presents with SOB and lightheadedness x1 prior to arrival.  pt reports he feels better but still a little weak.  pt reports his wife recently passed away suddenly, causing him to have a decreased appetite.  denies fever, body aches, chills, vomiting, diarrhea, cough, dysuria, sore throat.

## 2022-11-24 NOTE — ED PROVIDER NOTE - PROGRESS NOTE DETAILS
JK - labs, CXR WNL. Patient tolerating PO, ambulating on his own, no FND. Ready and agreeable to DC with return precautions.

## 2022-11-24 NOTE — ED ADULT NURSE NOTE - CHIEF COMPLAINT QUOTE
pt BIBEMS from home with worsening shortness of breath and difficulty breathing over the past few hours. hx of emphysema. STAT EKG obtained.

## 2022-11-24 NOTE — ED PROVIDER NOTE - OBJECTIVE STATEMENT
Patient is a 71 yo male with PMHx DM, COPD not on home O2, emphysema, HTN, pancreatitis presenting with lightheadedness and SOB. Patient states he felt lightheaded and SOB while walking 1 hour PTA requiring him to sit down. Patient symptoms resolving at this time. Denies dysphagia, dysarthria, syncope, n/v, CP, dizziness, difficulty ambulating, fall. Patient states he has not been eating as well since his wife passed away recently. Patient had recent echo and stress test within the past 6 months which were negative. Denies fevers, chills, dizziness, dysphagia, dysarthria, diplopia, photophobia, syncope, cough, congestion, CP, abdominal pain, neck pain, back pain, diarrhea, dysuria, hematuria, hematochezia, hematemesis, n/v, recent travel, sick contacts, leg swelling.

## 2022-11-24 NOTE — ED PROVIDER NOTE - PHYSICAL EXAMINATION
Constitutional: Tired appearing, awake, alert, oriented to person, place, and time/situation and in no apparent distress  ENMT: Airway patent nasal mucosa clear. Mouth with normal mucosa. Throat has no vesicles no oropharyngeal exudates and uvula is midline. No blood in the oropharynx  EYES: clear bilaterally, pupils equal, round and reactive to light  Cardiac: Regular rate, regular rhythm. Heart sounds S1, S2. No murmurs, rubs or gallops. Good capillary refill, 2+ pulses, no peripheral edema  Respiratory: Mild bilateral lower lobe end expiratory wheezes, no use of accessory muscles, no crackles, satting 99% on RA in no distress  Gastrointestinal: Abdomen nondistended, non-tender, no rebound guarding or peritoneal signs  Genitourinary: No CVA tenderness, pelvis nontender, bladder nondistended  Musculoskeletal: Spine appears normal, range of motion is not limited, no muscle or joint tenderness  Neurological: Alert and oriented, no focal deficits, no motor or sensory deficits. CN 2-12 intact, PERRLA, EOMI, No FND  Skin: Skin normal color for race, warm, dry and intact, No evidence of rash

## 2022-11-24 NOTE — ED PROVIDER NOTE - ATTENDING CONTRIBUTION TO CARE
73 yo male with PMHx DM, COPD/emphysema, HTN presenting with lightheadedness and near syncope while walking before. reports recently with sob similar to copd. After sitting down and upon arrival to ED patient feeling better. Denies syncope, headache, cp, abd pain, leg swelling. Patient states he has not been eating as well since his wife passed away recently. Patient had recent echo and stress test with cardiologist within the past 6 months which were negative. Former smoker. Symptoms now all resolved.  AP - well appearing, not hypoxic. asymptomatic now. ekg nonischemic. will check labs,cxr. reassess

## 2022-11-24 NOTE — ED PROVIDER NOTE - NSICDXFAMILYHX_GEN_ALL_CORE_FT
FAMILY HISTORY:  Family history of breast cancer in mother, Mother -  at 65 y/o  Family history of diabetes mellitus in mother, mother  Family history of lung cancer, Father -  at 53 y/o    Uncle  Still living? Unknown  Family history of cirrhosis of liver, Age at diagnosis: Age Unknown

## 2022-11-26 ENCOUNTER — TRANSCRIPTION ENCOUNTER (OUTPATIENT)
Age: 72
End: 2022-11-26

## 2022-11-30 ENCOUNTER — APPOINTMENT (OUTPATIENT)
Dept: INTERNAL MEDICINE | Facility: CLINIC | Age: 72
End: 2022-11-30

## 2022-11-30 NOTE — HISTORY OF PRESENT ILLNESS
[de-identified] : Mr. MCQUEEN is a pleasant 72 year old   Emily male with a past medical history significant for COPD, Type 2 DM on metformin & amaryl who comes in for depression c/b loss of appetitie, weight loss.\par \par Pt lost his wife to cancer in October just a month after her dx. No h/o EtOH, substance abuse, or mood d/o. \par Saw Dr Hoffman 11/8 accompanied by his grief counselor for loss of appetite,  weight down from 185-190 b/l to 177; anhedonia, crying, insomnia. PHQ 24 w/o SI w plan and contracted for safety. Provided psych resources, all booked out in this area, unfortunately. Started 7.5mg remeron hs to address insomnia anorexia and depression, stopped due to dizzyness. Asked to cut in half. Lexapro 5mg rx'd 11/14 by Dr Hoffman, chose not to take after reading the potential side effects.\par \par Fall 11/5 w knee swelling, but able to bear weight asked to ice and ace wrap prn ibuprofen.\par \par Experienced low sugar , last A1c 7% 8/2022 (runs +/- 0.3 last 3 yrs), metformin ER 2000mg daily plus Glimepiride 1mg bid. Checks FSBS\par

## 2022-12-06 ENCOUNTER — APPOINTMENT (OUTPATIENT)
Dept: INTERNAL MEDICINE | Facility: CLINIC | Age: 72
End: 2022-12-06

## 2022-12-12 ENCOUNTER — APPOINTMENT (OUTPATIENT)
Dept: INTERNAL MEDICINE | Facility: CLINIC | Age: 72
End: 2022-12-12

## 2022-12-12 NOTE — HISTORY OF PRESENT ILLNESS
[de-identified] : Patient unable to attend third visit in a row.  Message sent to practice manager to see if telemedicine option would work?\par \par Mr. MCQUEEN is a pleasant 72 year old   Emily male with a past medical history significant for COPD, Type 2 DM on metformin & amaryl who comes in for depression c/b loss of appetitie, weight loss.\par \par Pt lost his wife to cancer in October just a month after her dx. No h/o EtOH, substance abuse, or mood d/o. \par Saw Dr Hoffman 11/8 accompanied by his grief counselor for loss of appetite,  weight down from 185-190 b/l to 177; anhedonia, crying, insomnia. PHQ 24 w/o SI w plan and contracted for safety. Provided psych resources, all booked out in this area, unfortunately. Started 7.5mg remeron hs to address insomnia anorexia and depression, stopped due to dizzyness. Asked to cut in half. Lexapro 5mg rx'd 11/14 by Dr Hoffmna, chose not to take after reading the potential side effects.\par \par Fall 11/5 w knee swelling, but able to bear weight asked to ice and ace wrap prn ibuprofen.\par \par Experienced low sugar , last A1c 7% 8/2022 (runs +/- 0.3 last 3 yrs), metformin ER 2000mg daily plus Glimepiride 1mg bid. Checks FSBS\par

## 2022-12-12 NOTE — HISTORY OF PRESENT ILLNESS
[de-identified] : Mr. MCQUEEN is a pleasant 72 year old   Emily male with a past medical history significant for COPD, Type 2 DM on metformin & amaryl who comes in for depression c/b loss of appetitie, weight loss.\par \par Pt lost his wife to cancer in October just a month after her dx. No h/o EtOH, substance abuse, or mood d/o. \par Saw Dr Hoffman 11/8 accompanied by his grief counselor for loss of appetite,  weight down from 185-190 b/l to 177; anhedonia, crying, insomnia. PHQ 24 w/o SI w plan and contracted for safety. Provided psych resources, all booked out in this area, unfortunately. Started 7.5mg remeron hs to address insomnia anorexia and depression, stopped due to dizzyness. Asked to cut in half. Lexapro 5mg rx'd 11/14 by Dr Hoffman, chose not to take after reading the potential side effects.\par \par Fall 11/5 w knee swelling, but able to bear weight asked to ice and ace wrap prn ibuprofen.\par \par Experienced low sugar , last A1c 7% 8/2022 (runs +/- 0.3 last 3 yrs), metformin ER 2000mg daily plus Glimepiride 1mg bid. Checks FSBS\par

## 2022-12-15 ENCOUNTER — APPOINTMENT (OUTPATIENT)
Dept: INTERNAL MEDICINE | Facility: CLINIC | Age: 72
End: 2022-12-15

## 2022-12-15 VITALS
TEMPERATURE: 97.1 F | RESPIRATION RATE: 16 BRPM | BODY MASS INDEX: 27.92 KG/M2 | OXYGEN SATURATION: 95 % | HEIGHT: 66 IN | DIASTOLIC BLOOD PRESSURE: 83 MMHG | SYSTOLIC BLOOD PRESSURE: 143 MMHG | HEART RATE: 88 BPM

## 2022-12-15 VITALS
SYSTOLIC BLOOD PRESSURE: 143 MMHG | BODY MASS INDEX: 27.92 KG/M2 | OXYGEN SATURATION: 95 % | DIASTOLIC BLOOD PRESSURE: 83 MMHG | HEART RATE: 88 BPM | HEIGHT: 66 IN | RESPIRATION RATE: 16 BRPM | TEMPERATURE: 97.2 F

## 2022-12-15 VITALS
TEMPERATURE: 97.2 F | BODY MASS INDEX: 18.8 KG/M2 | HEIGHT: 66 IN | HEART RATE: 93 BPM | OXYGEN SATURATION: 95 % | RESPIRATION RATE: 16 BRPM | WEIGHT: 117 LBS | DIASTOLIC BLOOD PRESSURE: 78 MMHG | SYSTOLIC BLOOD PRESSURE: 151 MMHG

## 2022-12-15 VITALS — BODY MASS INDEX: 27.92 KG/M2 | WEIGHT: 173 LBS

## 2022-12-15 DIAGNOSIS — J43.9 EMPHYSEMA, UNSPECIFIED: ICD-10-CM

## 2022-12-15 PROCEDURE — 99212 OFFICE O/P EST SF 10 MIN: CPT

## 2022-12-15 RX ORDER — TIOTROPIUM BROMIDE INHALATION SPRAY 3.12 UG/1
2.5 SPRAY, METERED RESPIRATORY (INHALATION) DAILY
Qty: 3 | Refills: 3 | Status: DISCONTINUED | COMMUNITY
Start: 2021-05-21 | End: 2022-12-15

## 2022-12-15 RX ORDER — TRAMADOL HYDROCHLORIDE 50 MG/1
50 TABLET, COATED ORAL
Qty: 28 | Refills: 0 | Status: DISCONTINUED | COMMUNITY
Start: 2020-12-04 | End: 2022-12-15

## 2022-12-15 RX ORDER — TIZANIDINE 4 MG/1
4 TABLET ORAL
Refills: 0 | Status: DISCONTINUED | COMMUNITY
End: 2022-12-15

## 2022-12-15 RX ORDER — ROFLUMILAST 250 UG/1
250 TABLET ORAL
Qty: 30 | Refills: 3 | Status: DISCONTINUED | COMMUNITY
Start: 2020-02-19 | End: 2022-12-15

## 2022-12-15 RX ORDER — CYCLOBENZAPRINE HYDROCHLORIDE 10 MG/1
10 TABLET, FILM COATED ORAL 3 TIMES DAILY
Qty: 45 | Refills: 0 | Status: DISCONTINUED | COMMUNITY
Start: 2021-07-24 | End: 2022-12-15

## 2022-12-15 RX ORDER — GLIMEPIRIDE 1 MG/1
1 TABLET ORAL
Qty: 90 | Refills: 1 | Status: DISCONTINUED | COMMUNITY
Start: 2019-03-25 | End: 2022-12-15

## 2022-12-15 RX ORDER — OXYCODONE HYDROCHLORIDE AND ACETAMINOPHEN 5; 325 MG/1; MG/1
5-325 TABLET ORAL
Refills: 0 | Status: DISCONTINUED | COMMUNITY
End: 2022-12-15

## 2022-12-15 NOTE — ASSESSMENT
[FreeTextEntry1] : 1.  Decompensated grief reaction, resolving.  No medication indicated at this time.  I believe he is no longer in need of antidepressants or sleep aids, or urgent psychiatric intervention.\par \par 2.  Type 2 diabetes.  With his weight loss and decreased appetite he was experiencing sugars in the 60s.  He figured out he should hold his Amaryl, now running 95-1 30 daily to twice daily sugar checks.  A1c consistent with recent hypoglycemia weight loss, down to 6.4%.  I expect this will rebound somewhat, possibly in need only take 500 mg twice daily metformin based on what her sugars are doing in the spring.\par \par 3.  COPD\par 4.  Chronic low back pain\par 5.  Hypertension. Didnt take amlodipine today, BP not at goal.\par 6.  Hyperlipidemia.\par 7.  BPH.  Reviewed all of his medications and indications for each.  He did not feel he got much benefit from Daliresp or Incruse Ellipta and accordingly he will hold these and discuss further when he sees Dr. Aguirre, his pulmonologist in the future.\par 8.  Disposition.  Asked him to follow-up with  in Feb-March BP and DM check.\par \par

## 2022-12-15 NOTE — HISTORY OF PRESENT ILLNESS
[FreeTextEntry1] : Grief reaction  [de-identified] : Mr. MCQUEEN is a pleasant 72 year old  Emily male with a past medical history significant for COPD, Type 2 DM on metformin & amaryl who comes in for depression c/b loss of appetitie, weight loss.\par \par Pt lost his wife to cancer in October shortly after her dx. No h/o EtOH, substance abuse, or mood d/o. \par \par Saw Dr Hoffman 11/8 accompanied by his grief counselor for loss of appetite, weight down from 185-190 b/l to 177; anhedonia, crying, insomnia. PHQ 24 w/o SI w plan and contracted for safety. Provided psych resources, all booked out in this area, unfortunately. Started 7.5mg remeron hs to address insomnia anorexia and depression, stopped due to dizzyness. Asked to cut in half. Lexapro 5mg rx'd 11/14 by Dr Hoffman, chose not to take after reading the potential side effects.\par \par Down another 4 pounds today over the past 5 weeks.\par \favio Experienced low sugar , last A1c 7% 8/2022 (runs +/- 0.3 last 3 yrs), metformin ER 2000mg daily plus Glimepiride 1mg bid. Checks FSBS qd-bid. Self d/c'd glimepiride, decreased metformin to 500mg tid w meals, AM sugars  over past 2 weeks.\par \par Fortunately, patient's community, [employer], has been instrumental in supporting him and he feels overall much improved.  His appetite is recovering, he is sleeping better.  Though he is still mourning, he is not crying spontaneously anymore.  He plans to attend a blue [place] with his [employer], for survivors. We spoke about how helpful before able to closer to their loss to see him doing better.

## 2023-01-27 ENCOUNTER — APPOINTMENT (OUTPATIENT)
Dept: INTERNAL MEDICINE | Facility: CLINIC | Age: 73
End: 2023-01-27
Payer: MEDICARE

## 2023-01-27 VITALS
BODY MASS INDEX: 28.61 KG/M2 | HEART RATE: 78 BPM | WEIGHT: 178 LBS | HEIGHT: 66 IN | SYSTOLIC BLOOD PRESSURE: 157 MMHG | TEMPERATURE: 97.6 F | OXYGEN SATURATION: 96 % | DIASTOLIC BLOOD PRESSURE: 85 MMHG

## 2023-01-27 PROCEDURE — 99214 OFFICE O/P EST MOD 30 MIN: CPT | Mod: 25

## 2023-01-30 LAB
CHOLEST SERPL-MCNC: 163 MG/DL
HDLC SERPL-MCNC: 62 MG/DL
LDLC SERPL CALC-MCNC: 80 MG/DL
NONHDLC SERPL-MCNC: 101 MG/DL
TRIGL SERPL-MCNC: 102 MG/DL

## 2023-01-31 LAB
ALBUMIN SERPL ELPH-MCNC: 5 G/DL
ALP BLD-CCNC: 113 U/L
ALT SERPL-CCNC: 23 U/L
ANION GAP SERPL CALC-SCNC: 15 MMOL/L
AST SERPL-CCNC: 31 U/L
BILIRUB SERPL-MCNC: 0.3 MG/DL
BUN SERPL-MCNC: 13 MG/DL
CALCIUM SERPL-MCNC: 10.6 MG/DL
CHLORIDE SERPL-SCNC: 104 MMOL/L
CO2 SERPL-SCNC: 24 MMOL/L
CREAT SERPL-MCNC: 1.23 MG/DL
EGFR: 63 ML/MIN/1.73M2
ESTIMATED AVERAGE GLUCOSE: 151 MG/DL
GLUCOSE SERPL-MCNC: 112 MG/DL
HBA1C MFR BLD HPLC: 6.9 %
POTASSIUM SERPL-SCNC: 4.4 MMOL/L
PROT SERPL-MCNC: 8.1 G/DL
SODIUM SERPL-SCNC: 143 MMOL/L

## 2023-01-31 NOTE — ASSESSMENT
[FreeTextEntry1] : -PMH: T2DM, HTN, HLD, COPD, GERD, BPH, Chronic Low back pain. \par -SH: . 4 sons & 3 daughter. Disabled. Former Smoker (Quit 2011). Drinks 2-3 beers daily.\par \par SANDRA is a 72 year M whom is here today for f/u T2DM, HTN, HLD\par \par Specialists:\par -Pulm: Dr. Rudolph Aguirre\par -NeuroSx: Dr. Seamus Jones\par -Cardio: Dr. Neal Chris ([phone])\par \par -F/u labs drawn in office today\par -Further recs pending lab results\par -F/u FIT-DNA\par -F/u CT Chest Lung CA SCreening\par -Still needs to F/u w/ Optho (diabetic eye exam)\par -Still needs to f/u w/ GI (Colonoscopy) \par -Continue f/u w/ Pulm (COPD)\par -Continue F/u w/ NeuroSg (Chronic Low Back Pain)\par -Continue f/u w/ Cardio (HTN, HLD, DM))\par -RTO 6-8wk for f/u depression & DM

## 2023-01-31 NOTE — ADDENDUM
[FreeTextEntry1] :  A1c 6.9.\par Goal A1c < 7\par C/w Metformin 1500mg QD\par C/w BG monitoring. RTO sooner than 3mo if fasting BG levels are > 130 as we may need to increase the metformin again or restart his glipizide which was stopped due to weight loss\par \par Minimally elevated Ca level. \par If patient taking a MV or Ca supplement recommend he DC use. \par Will repeat this lab at next visit

## 2023-01-31 NOTE — HISTORY OF PRESENT ILLNESS
[FreeTextEntry1] : f/u T2DM, HTN, HLD [de-identified] : -PMH: T2DM, HTN, HLD, COPD, GERD, BPH, Chronic Low back pain. \par -SH: . 4 sons & 3 daughter. Disabled. Former Smoker (Quit 2011). Drinks 2-3 beers daily.\par \par SANDRA is a 72 year M whom is here today for f/u T2DM, HTN, HLD\par Today, pt reports that \par \par -HTN: Remains on Amlodipine 10mg & Losartan 25mg. Follows w/ Cardio. No reported changes. \par -HLD: Remains on Rosuvastatin 40mg HS. No reported changes. \par \par -T2DM: Remains on Metformin ER 1500mg. On ASA & Statin. Compliant w/ BG monitoring. (3/2022) A1c 7.6. Non-compliant w/ Optho Eval. No reported changes.\par \par -COPD: Managed on current Inhalers. Non-compliant to f/u w/ Pulm\par \par -Chronic Low Back Pain: S/p epidural injections with minimal improvement. He continues w/ PT. Follows w/ pain management. He will be following w/ Neurosurg.\par -BPH: Remains on Tamsulosin 0.4mg. Still needs to f/u w/ Uro. No reported changes.

## 2023-02-01 ENCOUNTER — NON-APPOINTMENT (OUTPATIENT)
Age: 73
End: 2023-02-01

## 2023-03-01 NOTE — ED ADULT NURSE REASSESSMENT NOTE - NS ED NURSE REASSESS COMMENT FT1
Pt resting comfortably, respiratory status improved. Vitals stable as documented. Pt awaiting txf to unit.
Patent

## 2023-03-08 ENCOUNTER — RX RENEWAL (OUTPATIENT)
Age: 73
End: 2023-03-08

## 2023-03-08 RX ORDER — TAMSULOSIN HYDROCHLORIDE 0.4 MG/1
0.4 CAPSULE ORAL
Qty: 90 | Refills: 3 | Status: ACTIVE | COMMUNITY
Start: 2018-09-14 | End: 1900-01-01

## 2023-03-14 ENCOUNTER — NON-APPOINTMENT (OUTPATIENT)
Age: 73
End: 2023-03-14

## 2023-03-17 ENCOUNTER — APPOINTMENT (OUTPATIENT)
Dept: INTERNAL MEDICINE | Facility: CLINIC | Age: 73
End: 2023-03-17

## 2023-03-17 ENCOUNTER — NON-APPOINTMENT (OUTPATIENT)
Age: 73
End: 2023-03-17

## 2023-03-17 DIAGNOSIS — Z13.29 ENCOUNTER FOR SCREENING FOR OTHER SUSPECTED ENDOCRINE DISORDER: ICD-10-CM

## 2023-03-17 DIAGNOSIS — Z13.6 ENCOUNTER FOR SCREENING FOR CARDIOVASCULAR DISORDERS: ICD-10-CM

## 2023-03-20 ENCOUNTER — NON-APPOINTMENT (OUTPATIENT)
Age: 73
End: 2023-03-20

## 2023-04-03 ENCOUNTER — RX RENEWAL (OUTPATIENT)
Age: 73
End: 2023-04-03

## 2023-04-03 ENCOUNTER — APPOINTMENT (OUTPATIENT)
Dept: INTERNAL MEDICINE | Facility: CLINIC | Age: 73
End: 2023-04-03

## 2023-04-03 PROBLEM — Z13.29 SCREENING FOR THYROID DISORDER: Status: RESOLVED | Noted: 2022-08-19 | Resolved: 2023-04-03

## 2023-04-03 PROBLEM — Z13.6 SCREENING FOR CARDIOVASCULAR CONDITION: Status: RESOLVED | Noted: 2022-08-19 | Resolved: 2023-04-03

## 2023-04-05 ENCOUNTER — APPOINTMENT (OUTPATIENT)
Dept: INTERNAL MEDICINE | Facility: CLINIC | Age: 73
End: 2023-04-05

## 2023-04-05 ENCOUNTER — NON-APPOINTMENT (OUTPATIENT)
Age: 73
End: 2023-04-05

## 2023-04-12 ENCOUNTER — APPOINTMENT (OUTPATIENT)
Dept: INTERNAL MEDICINE | Facility: CLINIC | Age: 73
End: 2023-04-12
Payer: MEDICARE

## 2023-04-12 ENCOUNTER — RX RENEWAL (OUTPATIENT)
Age: 73
End: 2023-04-12

## 2023-04-12 VITALS
TEMPERATURE: 97.6 F | RESPIRATION RATE: 16 BRPM | OXYGEN SATURATION: 98 % | HEIGHT: 66 IN | BODY MASS INDEX: 28.61 KG/M2 | DIASTOLIC BLOOD PRESSURE: 89 MMHG | HEART RATE: 108 BPM | WEIGHT: 178 LBS | SYSTOLIC BLOOD PRESSURE: 146 MMHG

## 2023-04-12 PROCEDURE — 99214 OFFICE O/P EST MOD 30 MIN: CPT | Mod: 25

## 2023-04-12 PROCEDURE — 36415 COLL VENOUS BLD VENIPUNCTURE: CPT

## 2023-04-12 RX ORDER — DULOXETINE HYDROCHLORIDE 30 MG/1
30 CAPSULE, DELAYED RELEASE PELLETS ORAL
Qty: 90 | Refills: 0 | Status: DISCONTINUED | COMMUNITY
Start: 2023-01-27 | End: 2023-04-12

## 2023-04-13 LAB
ALBUMIN SERPL ELPH-MCNC: 4.7 G/DL
ALP BLD-CCNC: 90 U/L
ALT SERPL-CCNC: 19 U/L
ANION GAP SERPL CALC-SCNC: 14 MMOL/L
AST SERPL-CCNC: 29 U/L
BILIRUB SERPL-MCNC: 0.2 MG/DL
BUN SERPL-MCNC: 16 MG/DL
CALCIUM SERPL-MCNC: 10.3 MG/DL
CALCIUM SERPL-MCNC: 10.3 MG/DL
CHLORIDE SERPL-SCNC: 102 MMOL/L
CO2 SERPL-SCNC: 23 MMOL/L
CREAT SERPL-MCNC: 1.12 MG/DL
EGFR: 71 ML/MIN/1.73M2
GLUCOSE SERPL-MCNC: 102 MG/DL
HCT VFR BLD CALC: 41.4 %
HGB BLD-MCNC: 13.1 G/DL
MCHC RBC-ENTMCNC: 29.7 PG
MCHC RBC-ENTMCNC: 31.6 GM/DL
MCV RBC AUTO: 93.9 FL
PARATHYROID HORMONE INTACT: 28 PG/ML
PLATELET # BLD AUTO: 280 K/UL
POTASSIUM SERPL-SCNC: 4.4 MMOL/L
PROT SERPL-MCNC: 7.6 G/DL
RBC # BLD: 4.41 M/UL
RBC # FLD: 14.1 %
SODIUM SERPL-SCNC: 140 MMOL/L
WBC # FLD AUTO: 9.73 K/UL

## 2023-04-14 LAB
25(OH)D3 SERPL-MCNC: 11.5 NG/ML
ESTIMATED AVERAGE GLUCOSE: 163 MG/DL
HBA1C MFR BLD HPLC: 7.3 %

## 2023-04-14 NOTE — ASSESSMENT
[FreeTextEntry1] : -PMH: T2DM, HTN, HLD, COPD, GERD, BPH, Chronic Low back pain. \par -SH: . 4 sons & 3 daughter. Disabled. Former Smoker (Quit 2011). Drinks 2-3 beers daily.\par \par SANDRA is a 72 year M whom is here today for f/u T2DM, HTN, HLD\par \par Specialists:\par -Pulm: Dr. Rudolph Aguirre\par -NeuroSx: Dr. Seamus Jones\par -Cardio: Dr. Neal Chris ([phone])\par \par -F/u labs drawn in office today\par -Further recs pending lab results\par -F/u FIT-DNA\par -F/u CT Chest Lung CA SCreening\par -Still needs to F/u w/ Optho (diabetic eye exam)\par -Still needs to f/u w/ GI (Colonoscopy) \par -Continue f/u w/ Pulm (COPD)\par -Continue F/u w/ NeuroSg (Chronic Low Back Pain)\par -Continue f/u w/ Cardio (HTN, HLD, DM))\par -RTO 3mo

## 2023-04-14 NOTE — ADDENDUM
[FreeTextEntry1] : Diabetes uncontrolled with an A1c of 7.3.  Increase metformin to 2000 mg once daily.  RTO 3 months.\par \par Vitamin D deficiency.\par Recommend vitamin D3 50,000 IU q. weekly followed by maintenance with 2000 IU daily

## 2023-04-14 NOTE — HISTORY OF PRESENT ILLNESS
[FreeTextEntry1] : f/u T2DM, HTN, HLD [de-identified] : -PMH: T2DM, HTN, HLD, COPD, GERD, BPH, Chronic Low back pain. \par -SH: . 4 sons & 3 daughter. Disabled. Former Smoker (Quit 2011). Drinks 2-3 beers daily.\par \par SANDRA is a 72 year M whom is here today for f/u T2DM, HTN, HLD\par \par -HTN: Remains on Amlodipine 10mg & Losartan 25mg. Follows w/ Cardio. No reported changes. \par -HLD: Remains on Rosuvastatin 40mg HS. No reported changes. \par \par -T2DM: Remains on Metformin ER 1500mg. On ASA & Statin. Compliant w/ BG monitoring. (3/2022) A1c 7.6. Non-compliant w/ Optho Eval. No reported changes.\par \par -COPD: Managed on current Inhalers. Non-compliant to f/u w/ Pulm\par \par -Chronic Low Back Pain: S/p epidural injections with minimal improvement. He continues w/ PT. Follows w/ pain management. He will be following w/ Neurosurg.\par -BPH: Remains on Tamsulosin 0.4mg. Still needs to f/u w/ Uro. No reported changes.

## 2023-04-14 NOTE — ED PROVIDER NOTE - PSH
Detail Level: Detailed No significant past surgical history Sunscreen Recommendations: Any zinc-based sunblock with concentration of 10-20 % is preferred. Brands such as Elta MD, [employer], or [email or web address] are preferred. Detail Level: Zone Laser Recommendations: CO2RE laser can remove hyperpigmenation and fine lines and wrinkles. Sunscreen Recommendations: Elta MD or other zinc-based sunblock is recommended daily. Please find a concentration with at least 10% zinc or higher for daily use and ideal for 10-20% for prolonged outdoor physical activity. Ipl Recommendations: IPL Photofacial is usually 1 treatment with possible 1-2 touchups. Treatments are performed once monthly. Chemical Peel Recommendations: Can consult with Mery Gonzalez, for free consult. Bleaching Agents Recommendations: For a non-hydroquinone option, Cyspera 3-step treatment is a good alternative. Topical Retinoids Recommendations: Retinoids work great and should be applied in the evening. Please note they do increase photosensitivity. A daily sunblock should be worn with these at all times to minimize side effects.

## 2023-04-21 ENCOUNTER — NON-APPOINTMENT (OUTPATIENT)
Age: 73
End: 2023-04-21

## 2023-06-07 ENCOUNTER — APPOINTMENT (OUTPATIENT)
Dept: INTERNAL MEDICINE | Facility: CLINIC | Age: 73
End: 2023-06-07

## 2023-06-07 NOTE — HISTORY OF PRESENT ILLNESS
[FreeTextEntry8] : -PMH: T2DM, HTN, HLD, COPD, GERD, BPH, Chronic Low back pain. \par -SH: . 4 sons & 3 daughter. Disabled. Former Smoker (Quit 2011). Drinks 2-3 beers daily.\par \par SANDRA is a 73 year M whom is here today w/ c/o SOB and increased need for use of ALbuterol inhaler

## 2023-06-16 ENCOUNTER — APPOINTMENT (OUTPATIENT)
Dept: NEUROSURGERY | Facility: CLINIC | Age: 73
End: 2023-06-16

## 2023-06-27 ENCOUNTER — APPOINTMENT (OUTPATIENT)
Dept: INTERNAL MEDICINE | Facility: CLINIC | Age: 73
End: 2023-06-27

## 2023-06-27 ENCOUNTER — NON-APPOINTMENT (OUTPATIENT)
Age: 73
End: 2023-06-27

## 2023-06-27 DIAGNOSIS — Z86.39 PERSONAL HISTORY OF OTHER ENDOCRINE, NUTRITIONAL AND METABOLIC DISEASE: ICD-10-CM

## 2023-06-27 DIAGNOSIS — F43.20 ADJUSTMENT DISORDER, UNSPECIFIED: ICD-10-CM

## 2023-06-27 DIAGNOSIS — Z01.811 ENCOUNTER FOR PREPROCEDURAL RESPIRATORY EXAMINATION: ICD-10-CM

## 2023-06-28 ENCOUNTER — APPOINTMENT (OUTPATIENT)
Dept: PULMONOLOGY | Facility: CLINIC | Age: 73
End: 2023-06-28

## 2023-07-06 ENCOUNTER — RX RENEWAL (OUTPATIENT)
Age: 73
End: 2023-07-06

## 2023-07-19 ENCOUNTER — APPOINTMENT (OUTPATIENT)
Dept: INTERNAL MEDICINE | Facility: CLINIC | Age: 73
End: 2023-07-19

## 2023-09-08 ENCOUNTER — APPOINTMENT (OUTPATIENT)
Dept: INTERNAL MEDICINE | Facility: CLINIC | Age: 73
End: 2023-09-08

## 2023-09-08 ENCOUNTER — NON-APPOINTMENT (OUTPATIENT)
Age: 73
End: 2023-09-08

## 2023-09-08 DIAGNOSIS — E55.9 VITAMIN D DEFICIENCY, UNSPECIFIED: ICD-10-CM

## 2023-09-15 ENCOUNTER — TRANSCRIPTION ENCOUNTER (OUTPATIENT)
Age: 73
End: 2023-09-15

## 2023-10-09 ENCOUNTER — RX RENEWAL (OUTPATIENT)
Age: 73
End: 2023-10-09

## 2023-10-11 ENCOUNTER — RX RENEWAL (OUTPATIENT)
Age: 73
End: 2023-10-11

## 2023-10-11 RX ORDER — ALBUTEROL SULFATE 90 UG/1
108 (90 BASE) INHALANT RESPIRATORY (INHALATION)
Qty: 3 | Refills: 0 | Status: ACTIVE | COMMUNITY
Start: 2022-08-02 | End: 1900-01-01

## 2023-10-13 ENCOUNTER — NON-APPOINTMENT (OUTPATIENT)
Age: 73
End: 2023-10-13

## 2023-10-13 ENCOUNTER — APPOINTMENT (OUTPATIENT)
Dept: INTERNAL MEDICINE | Facility: CLINIC | Age: 73
End: 2023-10-13
Payer: MEDICARE

## 2023-10-13 VITALS
TEMPERATURE: 97.2 F | BODY MASS INDEX: 29.09 KG/M2 | SYSTOLIC BLOOD PRESSURE: 144 MMHG | RESPIRATION RATE: 16 BRPM | HEART RATE: 125 BPM | DIASTOLIC BLOOD PRESSURE: 78 MMHG | OXYGEN SATURATION: 98 % | WEIGHT: 181 LBS | HEIGHT: 66 IN

## 2023-10-13 DIAGNOSIS — R06.09 OTHER FORMS OF DYSPNEA: ICD-10-CM

## 2023-10-13 PROCEDURE — 93000 ELECTROCARDIOGRAM COMPLETE: CPT

## 2023-10-13 PROCEDURE — 36415 COLL VENOUS BLD VENIPUNCTURE: CPT

## 2023-10-13 PROCEDURE — 99214 OFFICE O/P EST MOD 30 MIN: CPT | Mod: 25

## 2023-10-13 RX ORDER — UMECLIDINIUM 62.5 UG/1
62.5 AEROSOL, POWDER ORAL DAILY
Qty: 3 | Refills: 0 | Status: ACTIVE | COMMUNITY
Start: 1900-01-01 | End: 1900-01-01

## 2023-10-13 RX ORDER — METFORMIN HYDROCHLORIDE 500 MG/1
500 TABLET, COATED ORAL
Qty: 180 | Refills: 0 | Status: ACTIVE | COMMUNITY
Start: 2023-04-21

## 2023-10-15 LAB
ALBUMIN SERPL ELPH-MCNC: 4.7 G/DL
ALP BLD-CCNC: 102 U/L
ALT SERPL-CCNC: 25 U/L
ANION GAP SERPL CALC-SCNC: 14 MMOL/L
AST SERPL-CCNC: 35 U/L
BASOPHILS # BLD AUTO: 0.08 K/UL
BASOPHILS NFR BLD AUTO: 1 %
BILIRUB SERPL-MCNC: 0.2 MG/DL
BUN SERPL-MCNC: 12 MG/DL
CALCIUM SERPL-MCNC: 9.7 MG/DL
CHLORIDE SERPL-SCNC: 103 MMOL/L
CO2 SERPL-SCNC: 22 MMOL/L
CREAT SERPL-MCNC: 1.18 MG/DL
EGFR: 66 ML/MIN/1.73M2
EOSINOPHIL # BLD AUTO: 0.32 K/UL
EOSINOPHIL NFR BLD AUTO: 4.1 %
GLUCOSE SERPL-MCNC: 133 MG/DL
HCT VFR BLD CALC: 40.8 %
HGB BLD-MCNC: 13.3 G/DL
IMM GRANULOCYTES NFR BLD AUTO: 0.4 %
LYMPHOCYTES # BLD AUTO: 2.82 K/UL
LYMPHOCYTES NFR BLD AUTO: 36.3 %
MAN DIFF?: NORMAL
MCHC RBC-ENTMCNC: 31.1 PG
MCHC RBC-ENTMCNC: 32.6 GM/DL
MCV RBC AUTO: 95.6 FL
MONOCYTES # BLD AUTO: 0.68 K/UL
MONOCYTES NFR BLD AUTO: 8.8 %
NEUTROPHILS # BLD AUTO: 3.84 K/UL
NEUTROPHILS NFR BLD AUTO: 49.4 %
PLATELET # BLD AUTO: 303 K/UL
POTASSIUM SERPL-SCNC: 5.2 MMOL/L
PROT SERPL-MCNC: 7.6 G/DL
PSA SERPL-MCNC: 1.08 NG/ML
RBC # BLD: 4.27 M/UL
RBC # FLD: 13.4 %
SODIUM SERPL-SCNC: 139 MMOL/L
WBC # FLD AUTO: 7.77 K/UL

## 2023-10-16 LAB
CHOLEST SERPL-MCNC: 161 MG/DL
ESTIMATED AVERAGE GLUCOSE: 163 MG/DL
HBA1C MFR BLD HPLC: 7.3 %
HDLC SERPL-MCNC: 61 MG/DL
LDLC SERPL CALC-MCNC: 81 MG/DL
NONHDLC SERPL-MCNC: 100 MG/DL
TRIGL SERPL-MCNC: 106 MG/DL

## 2023-11-10 ENCOUNTER — RX RENEWAL (OUTPATIENT)
Age: 73
End: 2023-11-10

## 2023-12-20 NOTE — ED ADULT TRIAGE NOTE - CADM TRG TX PRIOR TO ARRIVAL
A STENT SYNERGY XD MNRL 5MM 12MM DLV SYS 1 ACC PORT RADOPQ - [id number] was deployed in the right coronary artery.   The stent was deployed at 24 pam for 75 seconds at 12/20/2023 10:29 PM . see ambulance record

## 2024-01-10 RX ORDER — BLOOD SUGAR DIAGNOSTIC
STRIP MISCELLANEOUS
Qty: 200 | Refills: 1 | Status: DISCONTINUED | COMMUNITY
Start: 2019-12-30 | End: 2024-01-10

## 2024-01-11 RX ORDER — BLOOD-GLUCOSE METER
KIT MISCELLANEOUS
Qty: 1 | Refills: 0 | Status: DISCONTINUED | COMMUNITY
Start: 2017-01-30 | End: 2024-01-11

## 2024-01-11 RX ORDER — BLOOD SUGAR DIAGNOSTIC
STRIP MISCELLANEOUS
Qty: 1 | Refills: 2 | Status: ACTIVE | COMMUNITY
Start: 2022-01-21 | End: 1900-01-01

## 2024-01-14 ENCOUNTER — RX RENEWAL (OUTPATIENT)
Age: 74
End: 2024-01-14

## 2024-01-16 ENCOUNTER — RX RENEWAL (OUTPATIENT)
Age: 74
End: 2024-01-16

## 2024-01-31 ENCOUNTER — APPOINTMENT (OUTPATIENT)
Dept: INTERNAL MEDICINE | Facility: CLINIC | Age: 74
End: 2024-01-31

## 2024-03-05 ENCOUNTER — APPOINTMENT (OUTPATIENT)
Dept: INTERNAL MEDICINE | Facility: CLINIC | Age: 74
End: 2024-03-05

## 2024-03-12 ENCOUNTER — APPOINTMENT (OUTPATIENT)
Dept: INTERNAL MEDICINE | Facility: CLINIC | Age: 74
End: 2024-03-12
Payer: MEDICARE

## 2024-03-12 ENCOUNTER — NON-APPOINTMENT (OUTPATIENT)
Age: 74
End: 2024-03-12

## 2024-03-12 ENCOUNTER — RX RENEWAL (OUTPATIENT)
Age: 74
End: 2024-03-12

## 2024-03-12 VITALS
HEART RATE: 100 BPM | TEMPERATURE: 97.3 F | WEIGHT: 169 LBS | OXYGEN SATURATION: 96 % | BODY MASS INDEX: 27.16 KG/M2 | RESPIRATION RATE: 16 BRPM | SYSTOLIC BLOOD PRESSURE: 128 MMHG | DIASTOLIC BLOOD PRESSURE: 90 MMHG | HEIGHT: 66 IN

## 2024-03-12 DIAGNOSIS — I10 ESSENTIAL (PRIMARY) HYPERTENSION: ICD-10-CM

## 2024-03-12 DIAGNOSIS — R63.4 ABNORMAL WEIGHT LOSS: ICD-10-CM

## 2024-03-12 DIAGNOSIS — J44.9 CHRONIC OBSTRUCTIVE PULMONARY DISEASE, UNSPECIFIED: ICD-10-CM

## 2024-03-12 DIAGNOSIS — E11.9 TYPE 2 DIABETES MELLITUS W/OUT COMPLICATIONS: ICD-10-CM

## 2024-03-12 DIAGNOSIS — F32.9 MAJOR DEPRESSIVE DISORDER, SINGLE EPISODE, UNSPECIFIED: ICD-10-CM

## 2024-03-12 DIAGNOSIS — E11.69 TYPE 2 DIABETES MELLITUS WITH OTHER SPECIFIED COMPLICATION: ICD-10-CM

## 2024-03-12 DIAGNOSIS — E78.5 TYPE 2 DIABETES MELLITUS WITH OTHER SPECIFIED COMPLICATION: ICD-10-CM

## 2024-03-12 DIAGNOSIS — R06.02 SHORTNESS OF BREATH: ICD-10-CM

## 2024-03-12 PROCEDURE — 99214 OFFICE O/P EST MOD 30 MIN: CPT

## 2024-03-12 PROCEDURE — 93000 ELECTROCARDIOGRAM COMPLETE: CPT

## 2024-03-12 PROCEDURE — 36415 COLL VENOUS BLD VENIPUNCTURE: CPT

## 2024-03-12 RX ORDER — ESCITALOPRAM OXALATE 5 MG/1
5 TABLET ORAL
Qty: 90 | Refills: 0 | Status: ACTIVE | COMMUNITY
Start: 2022-11-14 | End: 1900-01-01

## 2024-03-13 LAB
ALBUMIN SERPL ELPH-MCNC: 4.9 G/DL
ALP BLD-CCNC: 118 U/L
ALT SERPL-CCNC: 43 U/L
ANION GAP SERPL CALC-SCNC: 15 MMOL/L
AST SERPL-CCNC: 57 U/L
BASOPHILS # BLD AUTO: 0.07 K/UL
BASOPHILS NFR BLD AUTO: 0.7 %
BILIRUB SERPL-MCNC: 0.3 MG/DL
BUN SERPL-MCNC: 15 MG/DL
CALCIUM SERPL-MCNC: 10.2 MG/DL
CHLORIDE SERPL-SCNC: 102 MMOL/L
CHOLEST SERPL-MCNC: 166 MG/DL
CO2 SERPL-SCNC: 24 MMOL/L
CREAT SERPL-MCNC: 1.4 MG/DL
CREAT SPEC-SCNC: 76 MG/DL
EGFR: 54 ML/MIN/1.73M2
EOSINOPHIL # BLD AUTO: 0.17 K/UL
EOSINOPHIL NFR BLD AUTO: 1.7 %
ESTIMATED AVERAGE GLUCOSE: 169 MG/DL
FOLATE SERPL-MCNC: 11.5 NG/ML
GLUCOSE SERPL-MCNC: 106 MG/DL
HBA1C MFR BLD HPLC: 7.5 %
HCT VFR BLD CALC: 43.8 %
HDLC SERPL-MCNC: 71 MG/DL
HGB BLD-MCNC: 14.3 G/DL
IMM GRANULOCYTES NFR BLD AUTO: 0.3 %
LDLC SERPL CALC-MCNC: 79 MG/DL
LYMPHOCYTES # BLD AUTO: 3.01 K/UL
LYMPHOCYTES NFR BLD AUTO: 30.2 %
MAN DIFF?: NORMAL
MCHC RBC-ENTMCNC: 30.9 PG
MCHC RBC-ENTMCNC: 32.6 GM/DL
MCV RBC AUTO: 94.6 FL
MICROALBUMIN 24H UR DL<=1MG/L-MCNC: 2 MG/DL
MICROALBUMIN/CREAT 24H UR-RTO: 26 MG/G
MONOCYTES # BLD AUTO: 0.83 K/UL
MONOCYTES NFR BLD AUTO: 8.3 %
NEUTROPHILS # BLD AUTO: 5.86 K/UL
NEUTROPHILS NFR BLD AUTO: 58.8 %
NONHDLC SERPL-MCNC: 95 MG/DL
PLATELET # BLD AUTO: 309 K/UL
POTASSIUM SERPL-SCNC: 4.3 MMOL/L
PROT SERPL-MCNC: 8.3 G/DL
RBC # BLD: 4.63 M/UL
RBC # FLD: 13.2 %
SODIUM SERPL-SCNC: 141 MMOL/L
TRIGL SERPL-MCNC: 88 MG/DL
TSH SERPL-ACNC: 2.22 UIU/ML
VIT B12 SERPL-MCNC: 468 PG/ML
WBC # FLD AUTO: 9.97 K/UL

## 2024-03-13 NOTE — ADDENDUM
[FreeTextEntry1] : A1c 7.5 Diabetes uncontrolled on current medications Recommend increase Jardiance up to 25 mg Please ensure compliance with metformin 500 mg twice daily RTO 1 month  Acute kidney injury noted on recent labs of unclear etiology.  Possibly related to dehydration. Increase p.o. hydration over the next 24 hours and obtain a repeat BMP within the next 1 week to ensure no continued decline in renal function  Elevated AST Likely a result of reported increase alcohol consumption for which I strongly recommend cut back on Will monitor.  Plan for repeat labs.  If remains elevated will send for abdominal imaging

## 2024-03-13 NOTE — ASSESSMENT
[FreeTextEntry1] : -PMH: T2DM, HTN, HLD, COPD, GERD, BPH, Chronic Low back pain.  -SH: . 4 sons & 3 daughter. Disabled. Former Smoker (Quit 2011). Drinks 2-3 beers daily.  SANDRA is a 73 year M whom is here today for f/u T2DM, HTN, HLD  Specialists: -Pulm: Dr. Rudolph Aguirre -NeuroSx: Dr. Seamus Jones -Cardio: Dr. Neal Chris ([phone])  -F/u labs drawn in office today -Further recs pending lab results -F/u CT Chest Lung CA SCreening -Still needs to F/u w/ Optho (diabetic eye exam) -Still needs to f/u w/ GI (Colonoscopy)  -Continue f/u w/ Pulm (COPD) -Continue F/u w/ NeuroSg (Chronic Low Back Pain) -Continue f/u w/ Cardio (HTN, HLD, DM)) -RTO 3mo

## 2024-03-13 NOTE — HISTORY OF PRESENT ILLNESS
[FreeTextEntry1] : f/u T2DM, HTN, HLD [de-identified] : -PMH: T2DM, HTN, HLD, COPD, GERD, BPH, Chronic Low back pain.  -SH: . 4 sons & 3 daughter. Disabled. Former Smoker (Quit 2011). Drinks 2-3 beers daily.  SANDRA is a 73 year M whom is here today for f/u T2DM, HTN, HLD Of note, patient highly noncompliant with recommended follow-up continues to missed several appointments or needs to reschedule them Today, patient reports feling ok but has been feeling down and depressed  -HTN: Remains on Amlodipine 10mg & Losartan 25mg BID. Follows w/ Cardio. -HLD: Remains on Rosuvastatin 40mg HS. No reported changes.   -T2DM: Remains on Jardiance 10mg QD & Metformin 500mg BID (Developed GI Upset at higher doses). On ASA & Statin. Compliant w/ BG monitoring. (10/2023) A1c 7.3. Non-compliant w/ Optho Eval. No reported changes.  -COPD: Managed on current Inhalers. Non-compliant to f/u w/ Pulm  -Chronic Low Back Pain: S/p epidural injections with minimal improvement. He continues w/ PT. Follows w/ pain management. He will be following w/ Neurosurg. -BPH: Remains on Tamsulosin 0.4mg. Still needs to f/u w/ Uro. No reported changes.

## 2024-03-18 RX ORDER — EMPAGLIFLOZIN 10 MG/1
10 TABLET, FILM COATED ORAL DAILY
Qty: 90 | Refills: 0 | Status: DISCONTINUED | COMMUNITY
Start: 2023-10-25 | End: 2024-03-18

## 2024-03-19 ENCOUNTER — RX RENEWAL (OUTPATIENT)
Age: 74
End: 2024-03-19

## 2024-03-19 ENCOUNTER — APPOINTMENT (OUTPATIENT)
Dept: INTERNAL MEDICINE | Facility: CLINIC | Age: 74
End: 2024-03-19
Payer: MEDICARE

## 2024-03-19 PROCEDURE — 36415 COLL VENOUS BLD VENIPUNCTURE: CPT

## 2024-03-20 ENCOUNTER — RX RENEWAL (OUTPATIENT)
Age: 74
End: 2024-03-20

## 2024-03-20 DIAGNOSIS — N17.9 ACUTE KIDNEY FAILURE, UNSPECIFIED: ICD-10-CM

## 2024-03-20 LAB
ANION GAP SERPL CALC-SCNC: 15 MMOL/L
BUN SERPL-MCNC: 9 MG/DL
CALCIUM SERPL-MCNC: 10.3 MG/DL
CHLORIDE SERPL-SCNC: 102 MMOL/L
CO2 SERPL-SCNC: 23 MMOL/L
CREAT SERPL-MCNC: 1.26 MG/DL
EGFR: 61 ML/MIN/1.73M2
GLUCOSE SERPL-MCNC: 99 MG/DL
POTASSIUM SERPL-SCNC: 4.9 MMOL/L
SODIUM SERPL-SCNC: 139 MMOL/L

## 2024-03-20 RX ORDER — BUDESONIDE AND FORMOTEROL FUMARATE DIHYDRATE 160; 4.5 UG/1; UG/1
160-4.5 AEROSOL RESPIRATORY (INHALATION) TWICE DAILY
Qty: 30.6 | Refills: 3 | Status: ACTIVE | COMMUNITY
Start: 2022-09-15 | End: 1900-01-01

## 2024-04-08 ENCOUNTER — RX RENEWAL (OUTPATIENT)
Age: 74
End: 2024-04-08

## 2024-04-08 RX ORDER — METFORMIN ER 500 MG 500 MG/1
500 TABLET ORAL
Qty: 360 | Refills: 0 | Status: ACTIVE | COMMUNITY
Start: 2021-08-16 | End: 1900-01-01

## 2024-04-08 RX ORDER — ROSUVASTATIN CALCIUM 40 MG/1
40 TABLET, FILM COATED ORAL
Qty: 90 | Refills: 3 | Status: ACTIVE | COMMUNITY
Start: 2018-01-02 | End: 1900-01-01

## 2024-04-18 ENCOUNTER — APPOINTMENT (OUTPATIENT)
Dept: INTERNAL MEDICINE | Facility: CLINIC | Age: 74
End: 2024-04-18

## 2024-05-23 ENCOUNTER — APPOINTMENT (OUTPATIENT)
Dept: INTERNAL MEDICINE | Facility: CLINIC | Age: 74
End: 2024-05-23

## 2024-06-05 NOTE — ED ADULT NURSE REASSESSMENT NOTE - INTEGUMENTARY WDL
Color consistent with ethnicity/race, warm, dry intact, resilient. negative normal affect/alert and oriented x3/normal behavior

## 2024-06-17 ENCOUNTER — RX RENEWAL (OUTPATIENT)
Age: 74
End: 2024-06-17

## 2024-06-17 RX ORDER — LOSARTAN POTASSIUM 25 MG/1
25 TABLET, FILM COATED ORAL
Qty: 180 | Refills: 0 | Status: ACTIVE | COMMUNITY
Start: 2019-07-11 | End: 1900-01-01

## 2024-06-17 RX ORDER — EMPAGLIFLOZIN 25 MG/1
25 TABLET, FILM COATED ORAL DAILY
Qty: 30 | Refills: 0 | Status: ACTIVE | COMMUNITY
Start: 2024-03-18 | End: 1900-01-01

## 2024-08-20 ENCOUNTER — NON-APPOINTMENT (OUTPATIENT)
Age: 74
End: 2024-08-20

## 2024-08-20 ENCOUNTER — APPOINTMENT (OUTPATIENT)
Dept: INTERNAL MEDICINE | Facility: CLINIC | Age: 74
End: 2024-08-20
Payer: MEDICARE

## 2024-08-20 VITALS
DIASTOLIC BLOOD PRESSURE: 70 MMHG | HEIGHT: 66 IN | RESPIRATION RATE: 16 BRPM | TEMPERATURE: 97.6 F | OXYGEN SATURATION: 96 % | HEART RATE: 119 BPM | WEIGHT: 152 LBS | SYSTOLIC BLOOD PRESSURE: 124 MMHG | BODY MASS INDEX: 24.43 KG/M2

## 2024-08-20 DIAGNOSIS — I10 ESSENTIAL (PRIMARY) HYPERTENSION: ICD-10-CM

## 2024-08-20 DIAGNOSIS — R63.4 ABNORMAL WEIGHT LOSS: ICD-10-CM

## 2024-08-20 DIAGNOSIS — E11.9 TYPE 2 DIABETES MELLITUS W/OUT COMPLICATIONS: ICD-10-CM

## 2024-08-20 DIAGNOSIS — F32.9 MAJOR DEPRESSIVE DISORDER, SINGLE EPISODE, UNSPECIFIED: ICD-10-CM

## 2024-08-20 DIAGNOSIS — R06.02 SHORTNESS OF BREATH: ICD-10-CM

## 2024-08-20 PROCEDURE — 93000 ELECTROCARDIOGRAM COMPLETE: CPT

## 2024-08-20 PROCEDURE — 99214 OFFICE O/P EST MOD 30 MIN: CPT

## 2024-08-20 PROCEDURE — 36415 COLL VENOUS BLD VENIPUNCTURE: CPT

## 2024-08-21 DIAGNOSIS — R70.0 ELEVATED ERYTHROCYTE SEDIMENTATION RATE: ICD-10-CM

## 2024-08-21 LAB
BASOPHILS # BLD AUTO: 0.11 K/UL
BASOPHILS NFR BLD AUTO: 1.2 %
EOSINOPHIL # BLD AUTO: 0.23 K/UL
EOSINOPHIL NFR BLD AUTO: 2.4 %
FERRITIN SERPL-MCNC: 232 NG/ML
FOLATE SERPL-MCNC: 10.6 NG/ML
HCT VFR BLD CALC: 45.1 %
HGB BLD-MCNC: 13.5 G/DL
IMM GRANULOCYTES NFR BLD AUTO: 0.6 %
LYMPHOCYTES # BLD AUTO: 2.6 K/UL
LYMPHOCYTES NFR BLD AUTO: 27.5 %
MAN DIFF?: NORMAL
MCHC RBC-ENTMCNC: 29.7 PG
MCHC RBC-ENTMCNC: 29.9 GM/DL
MCV RBC AUTO: 99.1 FL
MONOCYTES # BLD AUTO: 0.69 K/UL
MONOCYTES NFR BLD AUTO: 7.3 %
NEUTROPHILS # BLD AUTO: 5.77 K/UL
NEUTROPHILS NFR BLD AUTO: 61 %
PLATELET # BLD AUTO: 314 K/UL
RBC # BLD: 4.55 M/UL
RBC # FLD: 14.9 %
TSH SERPL-ACNC: 1.05 UIU/ML
VIT B12 SERPL-MCNC: 456 PG/ML
WBC # FLD AUTO: 9.46 K/UL

## 2024-08-23 LAB
25(OH)D3 SERPL-MCNC: 14.8 NG/ML
ALBUMIN SERPL ELPH-MCNC: 4.7 G/DL
ALP BLD-CCNC: 111 U/L
ALT SERPL-CCNC: 34 U/L
ANION GAP SERPL CALC-SCNC: 16 MMOL/L
AST SERPL-CCNC: 81 U/L
BILIRUB SERPL-MCNC: 0.4 MG/DL
BUN SERPL-MCNC: 17 MG/DL
CALCIUM SERPL-MCNC: 10.6 MG/DL
CHLORIDE SERPL-SCNC: 101 MMOL/L
CO2 SERPL-SCNC: 22 MMOL/L
CREAT SERPL-MCNC: 1.22 MG/DL
CRP SERPL-MCNC: 7 MG/L
EGFR: 63 ML/MIN/1.73M2
ERYTHROCYTE [SEDIMENTATION RATE] IN BLOOD BY WESTERGREN METHOD: 99 MM/HR
ESTIMATED AVERAGE GLUCOSE: 163 MG/DL
GLUCOSE SERPL-MCNC: 102 MG/DL
HBA1C MFR BLD HPLC: 7.3 %
POTASSIUM SERPL-SCNC: 4.5 MMOL/L
PROT SERPL-MCNC: 8.2 G/DL
SODIUM SERPL-SCNC: 139 MMOL/L

## 2024-08-23 RX ORDER — CHOLECALCIFEROL (VITAMIN D3) 1250 MCG
1.25 MG CAPSULE ORAL
Qty: 8 | Refills: 0 | Status: ACTIVE | COMMUNITY
Start: 2024-08-23 | End: 1900-01-01

## 2024-08-23 NOTE — HISTORY OF PRESENT ILLNESS
[FreeTextEntry8] : -PMH: T2DM, HTN, HLD, COPD, GERD, BPH, Chronic Low back pain. -SH: . 4 sons & 3 daughter. Disabled. Former Smoker (Quit 2011). Drinks 2-3 beers daily.  SANDRA is a 74 year M whom is here today With complaint of shortness of breath and clear sputum productive cough x 1mo  Patient has symptoms for the past several weeks and has called our office regarding this matter in which she has been advised to go to the emergency room soon as possible for immediate evaluation but has declined these recommendations and is here today  Cough wakes patient up at night he admits to SOB at rest and worse with exertion Denies CP or Leg swelling Admits to night sweats denies recent travel denies fevers, chills or lymph node enlargement, skin rashes admits to loss of appetite denies abdominal pain, changes in bowel movements, difficulties swallowing  he is currently folklowing w/. a therapist QOweek admits to feeling depressed. denies si/hi

## 2024-08-23 NOTE — ASSESSMENT
[FreeTextEntry1] : 74-year-old male whom is noncompliant with recommended follow-up last seen in March of this year who has undergone a 17 pound unintentional weight loss since that time and is reporting difficulties breathing Blood pressure is within normal limit as is his oxygen levels.  inital VS show he is tachycardic.  EKG obtained in office today demonstrates sinus rhythm with a heart rate of 94.  No acute ST or T wave changes. Physical exam demonstrates no signifcant findings Given patient's reported symptoms, unintentional weight loss I am recommending he go to the ER for emergent evaluation Rule out DVT, PE, infection, malignancy Follow-up labs drawn in office today Further recommendations pending results Patient declines going to the ER despite recommendations.  He is aware of the risks associated with failing to follow these recommendations which include death

## 2024-08-23 NOTE — ADDENDUM
[FreeTextEntry1] : Elevated ESR and CRP Elevated AST A1c 7.3 Vitamin D deficiency  Given the above recommend the following: Stat chest x-ray pending.  Concern for underlying infection versus malignancy which needs to be ruled out.  Patient encouraged to obtain chest x-ray and to return to office for blood cultures, urine culture.  It is still advised given these lab findings that he go to the ER given his exertional shortness of breath, abnormal labs and unintentional weight loss  Alcohol cessation advised  Will hold off on adjusting any of his diabetic meds until I get a better idea as to what is going on regarding the possible cause of underlying infection versus malignancy  Start OTC vitamin D3 50,000 IU q. weekly x 8 weeks

## 2024-08-26 ENCOUNTER — OUTPATIENT (OUTPATIENT)
Dept: OUTPATIENT SERVICES | Facility: HOSPITAL | Age: 74
LOS: 1 days | End: 2024-08-26
Payer: MEDICARE

## 2024-08-26 ENCOUNTER — APPOINTMENT (OUTPATIENT)
Dept: RADIOLOGY | Facility: CLINIC | Age: 74
End: 2024-08-26
Payer: MEDICARE

## 2024-08-26 DIAGNOSIS — R06.2 WHEEZING: ICD-10-CM

## 2024-08-26 DIAGNOSIS — R63.4 ABNORMAL WEIGHT LOSS: ICD-10-CM

## 2024-08-26 PROCEDURE — 71046 X-RAY EXAM CHEST 2 VIEWS: CPT

## 2024-08-26 PROCEDURE — 71046 X-RAY EXAM CHEST 2 VIEWS: CPT | Mod: 26

## 2024-08-29 ENCOUNTER — NON-APPOINTMENT (OUTPATIENT)
Age: 74
End: 2024-08-29

## 2024-09-03 ENCOUNTER — RX RENEWAL (OUTPATIENT)
Age: 74
End: 2024-09-03

## 2024-09-05 ENCOUNTER — APPOINTMENT (OUTPATIENT)
Dept: PULMONOLOGY | Facility: CLINIC | Age: 74
End: 2024-09-05

## 2024-09-23 ENCOUNTER — APPOINTMENT (OUTPATIENT)
Dept: INTERNAL MEDICINE | Facility: CLINIC | Age: 74
End: 2024-09-23

## 2024-10-14 ENCOUNTER — APPOINTMENT (OUTPATIENT)
Dept: INTERNAL MEDICINE | Facility: CLINIC | Age: 74
End: 2024-10-14
Payer: MEDICARE

## 2024-10-14 VITALS
HEART RATE: 79 BPM | TEMPERATURE: 97.2 F | RESPIRATION RATE: 16 BRPM | OXYGEN SATURATION: 93 % | HEIGHT: 66 IN | WEIGHT: 144 LBS | BODY MASS INDEX: 23.14 KG/M2 | DIASTOLIC BLOOD PRESSURE: 78 MMHG | SYSTOLIC BLOOD PRESSURE: 130 MMHG

## 2024-10-14 DIAGNOSIS — E78.5 TYPE 2 DIABETES MELLITUS WITH OTHER SPECIFIED COMPLICATION: ICD-10-CM

## 2024-10-14 DIAGNOSIS — F32.9 MAJOR DEPRESSIVE DISORDER, SINGLE EPISODE, UNSPECIFIED: ICD-10-CM

## 2024-10-14 DIAGNOSIS — R70.0 ELEVATED ERYTHROCYTE SEDIMENTATION RATE: ICD-10-CM

## 2024-10-14 DIAGNOSIS — E11.9 TYPE 2 DIABETES MELLITUS W/OUT COMPLICATIONS: ICD-10-CM

## 2024-10-14 DIAGNOSIS — R63.4 ABNORMAL WEIGHT LOSS: ICD-10-CM

## 2024-10-14 DIAGNOSIS — I10 ESSENTIAL (PRIMARY) HYPERTENSION: ICD-10-CM

## 2024-10-14 DIAGNOSIS — E11.69 TYPE 2 DIABETES MELLITUS WITH OTHER SPECIFIED COMPLICATION: ICD-10-CM

## 2024-10-14 PROCEDURE — G2211 COMPLEX E/M VISIT ADD ON: CPT

## 2024-10-14 PROCEDURE — 99215 OFFICE O/P EST HI 40 MIN: CPT

## 2024-10-14 PROCEDURE — 36415 COLL VENOUS BLD VENIPUNCTURE: CPT

## 2024-10-15 LAB
ALBUMIN SERPL ELPH-MCNC: 4.1 G/DL
ALP BLD-CCNC: 79 U/L
ALT SERPL-CCNC: 23 U/L
ANION GAP SERPL CALC-SCNC: 15 MMOL/L
AST SERPL-CCNC: 37 U/L
BILIRUB SERPL-MCNC: 0.2 MG/DL
BUN SERPL-MCNC: 10 MG/DL
CALCIUM SERPL-MCNC: 9.6 MG/DL
CHLORIDE SERPL-SCNC: 101 MMOL/L
CHOLEST SERPL-MCNC: 157 MG/DL
CO2 SERPL-SCNC: 20 MMOL/L
CREAT SERPL-MCNC: 1.12 MG/DL
EGFR: 70 ML/MIN/1.73M2
FERRITIN SERPL-MCNC: 180 NG/ML
GLUCOSE SERPL-MCNC: 138 MG/DL
HAV IGM SER QL: NONREACTIVE
HBV CORE IGM SER QL: NONREACTIVE
HBV SURFACE AG SER QL: NONREACTIVE
HCV AB SER QL: NONREACTIVE
HCV S/CO RATIO: 0.16 S/CO
HDLC SERPL-MCNC: 64 MG/DL
HIV1+2 AB SPEC QL IA.RAPID: NONREACTIVE
LDLC SERPL CALC-MCNC: 72 MG/DL
NONHDLC SERPL-MCNC: 93 MG/DL
POTASSIUM SERPL-SCNC: 4.8 MMOL/L
PROT SERPL-MCNC: 7.3 G/DL
SODIUM SERPL-SCNC: 137 MMOL/L
TRIGL SERPL-MCNC: 118 MG/DL

## 2024-10-17 LAB
BASOPHILS # BLD AUTO: 0.06 K/UL
BASOPHILS NFR BLD AUTO: 0.8 %
CRP SERPL-MCNC: 7 MG/L
EOSINOPHIL # BLD AUTO: 0.18 K/UL
EOSINOPHIL NFR BLD AUTO: 2.3 %
ERYTHROCYTE [SEDIMENTATION RATE] IN BLOOD BY WESTERGREN METHOD: 56 MM/HR
ESTIMATED AVERAGE GLUCOSE: 146 MG/DL
HBA1C MFR BLD HPLC: 6.7 %
HCT VFR BLD CALC: 39.3 %
HGB BLD-MCNC: 12.3 G/DL
IMM GRANULOCYTES NFR BLD AUTO: 0.4 %
LYMPHOCYTES # BLD AUTO: 1.98 K/UL
LYMPHOCYTES NFR BLD AUTO: 25.5 %
MAN DIFF?: NORMAL
MCHC RBC-ENTMCNC: 30.1 PG
MCHC RBC-ENTMCNC: 31.3 GM/DL
MCV RBC AUTO: 96.1 FL
MONOCYTES # BLD AUTO: 0.65 K/UL
MONOCYTES NFR BLD AUTO: 8.4 %
NEUTROPHILS # BLD AUTO: 4.86 K/UL
NEUTROPHILS NFR BLD AUTO: 62.6 %
PLATELET # BLD AUTO: 346 K/UL
RBC # BLD: 4.09 M/UL
RBC # FLD: 14.2 %
WBC # FLD AUTO: 7.76 K/UL

## 2024-10-21 LAB — BACTERIA BLD CULT: NORMAL

## 2024-11-15 ENCOUNTER — APPOINTMENT (OUTPATIENT)
Dept: INTERNAL MEDICINE | Facility: CLINIC | Age: 74
End: 2024-11-15

## 2024-11-22 ENCOUNTER — APPOINTMENT (OUTPATIENT)
Dept: CT IMAGING | Facility: CLINIC | Age: 74
End: 2024-11-22

## 2024-11-27 ENCOUNTER — APPOINTMENT (OUTPATIENT)
Dept: INTERNAL MEDICINE | Facility: CLINIC | Age: 74
End: 2024-11-27

## 2024-12-30 ENCOUNTER — APPOINTMENT (OUTPATIENT)
Dept: INTERNAL MEDICINE | Facility: CLINIC | Age: 74
End: 2024-12-30
Payer: MEDICARE

## 2024-12-30 ENCOUNTER — NON-APPOINTMENT (OUTPATIENT)
Age: 74
End: 2024-12-30

## 2024-12-30 VITALS
BODY MASS INDEX: 22.5 KG/M2 | HEIGHT: 66 IN | SYSTOLIC BLOOD PRESSURE: 100 MMHG | RESPIRATION RATE: 16 BRPM | WEIGHT: 140 LBS | TEMPERATURE: 98.1 F | HEART RATE: 117 BPM | DIASTOLIC BLOOD PRESSURE: 70 MMHG | OXYGEN SATURATION: 97 %

## 2024-12-30 DIAGNOSIS — F32.9 MAJOR DEPRESSIVE DISORDER, SINGLE EPISODE, UNSPECIFIED: ICD-10-CM

## 2024-12-30 DIAGNOSIS — R06.02 SHORTNESS OF BREATH: ICD-10-CM

## 2024-12-30 DIAGNOSIS — R00.0 TACHYCARDIA, UNSPECIFIED: ICD-10-CM

## 2024-12-30 DIAGNOSIS — E11.9 TYPE 2 DIABETES MELLITUS W/OUT COMPLICATIONS: ICD-10-CM

## 2024-12-30 DIAGNOSIS — I10 ESSENTIAL (PRIMARY) HYPERTENSION: ICD-10-CM

## 2024-12-30 DIAGNOSIS — R41.3 OTHER AMNESIA: ICD-10-CM

## 2024-12-30 DIAGNOSIS — R63.4 ABNORMAL WEIGHT LOSS: ICD-10-CM

## 2024-12-30 PROCEDURE — 36415 COLL VENOUS BLD VENIPUNCTURE: CPT

## 2024-12-30 PROCEDURE — 99215 OFFICE O/P EST HI 40 MIN: CPT

## 2024-12-30 PROCEDURE — G2211 COMPLEX E/M VISIT ADD ON: CPT

## 2024-12-30 PROCEDURE — 93000 ELECTROCARDIOGRAM COMPLETE: CPT

## 2024-12-31 LAB
ALBUMIN SERPL ELPH-MCNC: 4.6 G/DL
ALP BLD-CCNC: 90 U/L
ALT SERPL-CCNC: 18 U/L
ANION GAP SERPL CALC-SCNC: 13 MMOL/L
AST SERPL-CCNC: 28 U/L
BASOPHILS # BLD AUTO: 0.09 K/UL
BASOPHILS NFR BLD AUTO: 1 %
BILIRUB SERPL-MCNC: 0.3 MG/DL
BUN SERPL-MCNC: 17 MG/DL
CALCIUM SERPL-MCNC: 9.9 MG/DL
CHLORIDE SERPL-SCNC: 102 MMOL/L
CO2 SERPL-SCNC: 23 MMOL/L
CREAT SERPL-MCNC: 1.18 MG/DL
EGFR: 66 ML/MIN/1.73M2
EOSINOPHIL # BLD AUTO: 0.34 K/UL
EOSINOPHIL NFR BLD AUTO: 3.9 %
ESTIMATED AVERAGE GLUCOSE: 148 MG/DL
FERRITIN SERPL-MCNC: 80 NG/ML
FOLATE SERPL-MCNC: 10.6 NG/ML
GLUCOSE SERPL-MCNC: 98 MG/DL
HBA1C MFR BLD HPLC: 6.8 %
HCT VFR BLD CALC: 42.4 %
HGB BLD-MCNC: 13.4 G/DL
IMM GRANULOCYTES NFR BLD AUTO: 0.2 %
LYMPHOCYTES # BLD AUTO: 2.65 K/UL
LYMPHOCYTES NFR BLD AUTO: 30.6 %
MAN DIFF?: NORMAL
MCHC RBC-ENTMCNC: 30 PG
MCHC RBC-ENTMCNC: 31.6 G/DL
MCV RBC AUTO: 94.9 FL
MONOCYTES # BLD AUTO: 0.65 K/UL
MONOCYTES NFR BLD AUTO: 7.5 %
NEUTROPHILS # BLD AUTO: 4.92 K/UL
NEUTROPHILS NFR BLD AUTO: 56.8 %
PLATELET # BLD AUTO: 276 K/UL
POTASSIUM SERPL-SCNC: 4.5 MMOL/L
PROT SERPL-MCNC: 8.2 G/DL
RBC # BLD: 4.47 M/UL
RBC # FLD: 14.1 %
SODIUM SERPL-SCNC: 138 MMOL/L
TSH SERPL-ACNC: 1.08 UIU/ML
VIT B12 SERPL-MCNC: 477 PG/ML
WBC # FLD AUTO: 8.67 K/UL

## 2025-01-09 ENCOUNTER — OUTPATIENT (OUTPATIENT)
Dept: OUTPATIENT SERVICES | Facility: HOSPITAL | Age: 75
LOS: 1 days | End: 2025-01-09

## 2025-01-09 DIAGNOSIS — Z00.8 ENCOUNTER FOR OTHER GENERAL EXAMINATION: ICD-10-CM

## 2025-01-20 ENCOUNTER — APPOINTMENT (OUTPATIENT)
Dept: CT IMAGING | Facility: CLINIC | Age: 75
End: 2025-01-20

## 2025-01-30 ENCOUNTER — APPOINTMENT (OUTPATIENT)
Dept: INTERNAL MEDICINE | Facility: CLINIC | Age: 75
End: 2025-01-30
Payer: MEDICARE

## 2025-01-30 VITALS
OXYGEN SATURATION: 97 % | TEMPERATURE: 98.4 F | HEIGHT: 66 IN | DIASTOLIC BLOOD PRESSURE: 72 MMHG | HEART RATE: 79 BPM | SYSTOLIC BLOOD PRESSURE: 120 MMHG | WEIGHT: 140 LBS | BODY MASS INDEX: 22.5 KG/M2

## 2025-01-30 DIAGNOSIS — R63.4 ABNORMAL WEIGHT LOSS: ICD-10-CM

## 2025-01-30 DIAGNOSIS — E11.9 TYPE 2 DIABETES MELLITUS W/OUT COMPLICATIONS: ICD-10-CM

## 2025-01-30 DIAGNOSIS — I10 ESSENTIAL (PRIMARY) HYPERTENSION: ICD-10-CM

## 2025-01-30 DIAGNOSIS — R70.0 ELEVATED ERYTHROCYTE SEDIMENTATION RATE: ICD-10-CM

## 2025-01-30 DIAGNOSIS — J44.9 CHRONIC OBSTRUCTIVE PULMONARY DISEASE, UNSPECIFIED: ICD-10-CM

## 2025-01-30 DIAGNOSIS — Z86.79 PERSONAL HISTORY OF OTHER DISEASES OF THE CIRCULATORY SYSTEM: ICD-10-CM

## 2025-01-30 DIAGNOSIS — N40.0 BENIGN PROSTATIC HYPERPLASIA WITHOUT LOWER URINARY TRACT SYMPMS: ICD-10-CM

## 2025-01-30 PROCEDURE — 99214 OFFICE O/P EST MOD 30 MIN: CPT

## 2025-01-30 PROCEDURE — G2211 COMPLEX E/M VISIT ADD ON: CPT

## 2025-01-30 PROCEDURE — 36415 COLL VENOUS BLD VENIPUNCTURE: CPT

## 2025-01-31 LAB — PSA SERPL-MCNC: 0.94 NG/ML

## 2025-02-08 ENCOUNTER — APPOINTMENT (OUTPATIENT)
Dept: CT IMAGING | Facility: CLINIC | Age: 75
End: 2025-02-08

## 2025-02-08 ENCOUNTER — OUTPATIENT (OUTPATIENT)
Dept: OUTPATIENT SERVICES | Facility: HOSPITAL | Age: 75
LOS: 1 days | End: 2025-02-08
Payer: MEDICARE

## 2025-02-08 DIAGNOSIS — R63.4 ABNORMAL WEIGHT LOSS: ICD-10-CM

## 2025-02-08 DIAGNOSIS — Z00.8 ENCOUNTER FOR OTHER GENERAL EXAMINATION: ICD-10-CM

## 2025-02-08 DIAGNOSIS — R70.0 ELEVATED ERYTHROCYTE SEDIMENTATION RATE: ICD-10-CM

## 2025-02-08 PROCEDURE — 71270 CT THORAX DX C-/C+: CPT | Mod: 26

## 2025-02-08 PROCEDURE — 74178 CT ABD&PLV WO CNTR FLWD CNTR: CPT | Mod: 26

## 2025-02-08 PROCEDURE — 71270 CT THORAX DX C-/C+: CPT

## 2025-02-08 PROCEDURE — 74178 CT ABD&PLV WO CNTR FLWD CNTR: CPT

## 2025-02-13 ENCOUNTER — APPOINTMENT (OUTPATIENT)
Dept: INTERNAL MEDICINE | Facility: CLINIC | Age: 75
End: 2025-02-13
Payer: MEDICARE

## 2025-02-13 VITALS
OXYGEN SATURATION: 97 % | HEART RATE: 107 BPM | TEMPERATURE: 98.4 F | BODY MASS INDEX: 22.5 KG/M2 | WEIGHT: 140 LBS | DIASTOLIC BLOOD PRESSURE: 74 MMHG | SYSTOLIC BLOOD PRESSURE: 130 MMHG | HEIGHT: 66 IN

## 2025-02-13 DIAGNOSIS — E78.5 TYPE 2 DIABETES MELLITUS WITH OTHER SPECIFIED COMPLICATION: ICD-10-CM

## 2025-02-13 DIAGNOSIS — M87.00 IDIOPATHIC ASEPTIC NECROSIS OF UNSPECIFIED BONE: ICD-10-CM

## 2025-02-13 DIAGNOSIS — R80.9 PROTEINURIA, UNSPECIFIED: ICD-10-CM

## 2025-02-13 DIAGNOSIS — E11.69 TYPE 2 DIABETES MELLITUS WITH OTHER SPECIFIED COMPLICATION: ICD-10-CM

## 2025-02-13 DIAGNOSIS — E11.9 TYPE 2 DIABETES MELLITUS W/OUT COMPLICATIONS: ICD-10-CM

## 2025-02-13 DIAGNOSIS — R70.0 ELEVATED ERYTHROCYTE SEDIMENTATION RATE: ICD-10-CM

## 2025-02-13 DIAGNOSIS — I10 ESSENTIAL (PRIMARY) HYPERTENSION: ICD-10-CM

## 2025-02-13 DIAGNOSIS — F10.10 ALCOHOL ABUSE, UNCOMPLICATED: ICD-10-CM

## 2025-02-13 DIAGNOSIS — E55.9 VITAMIN D DEFICIENCY, UNSPECIFIED: ICD-10-CM

## 2025-02-13 DIAGNOSIS — R63.4 ABNORMAL WEIGHT LOSS: ICD-10-CM

## 2025-02-13 DIAGNOSIS — F32.9 MAJOR DEPRESSIVE DISORDER, SINGLE EPISODE, UNSPECIFIED: ICD-10-CM

## 2025-02-13 PROCEDURE — G2211 COMPLEX E/M VISIT ADD ON: CPT

## 2025-02-13 PROCEDURE — 99215 OFFICE O/P EST HI 40 MIN: CPT

## 2025-02-13 PROCEDURE — 36415 COLL VENOUS BLD VENIPUNCTURE: CPT

## 2025-02-13 RX ORDER — SERTRALINE 25 MG/1
25 TABLET, FILM COATED ORAL
Qty: 90 | Refills: 0 | Status: ACTIVE | COMMUNITY
Start: 2025-02-13 | End: 1900-01-01

## 2025-02-14 LAB — CRP SERPL-MCNC: <3 MG/L

## 2025-02-18 LAB
25(OH)D3 SERPL-MCNC: 10.7 NG/ML
ALBUMIN SERPL ELPH-MCNC: 4.6 G/DL
ALP BLD-CCNC: 89 U/L
ALT SERPL-CCNC: 16 U/L
ANION GAP SERPL CALC-SCNC: 20 MMOL/L
AST SERPL-CCNC: 27 U/L
BILIRUB SERPL-MCNC: 0.2 MG/DL
BUN SERPL-MCNC: 15 MG/DL
CALCIUM SERPL-MCNC: 10 MG/DL
CHLORIDE SERPL-SCNC: 103 MMOL/L
CO2 SERPL-SCNC: 17 MMOL/L
CREAT SERPL-MCNC: 1.11 MG/DL
CREAT SPEC-SCNC: 51 MG/DL
EGFR: 71 ML/MIN/1.73M2
ERYTHROCYTE [SEDIMENTATION RATE] IN BLOOD BY WESTERGREN METHOD: 61 MM/HR
ESTIMATED AVERAGE GLUCOSE: 143 MG/DL
GLUCOSE SERPL-MCNC: 91 MG/DL
HBA1C MFR BLD HPLC: 6.6 %
MICROALBUMIN 24H UR DL<=1MG/L-MCNC: <1.2 MG/DL
MICROALBUMIN/CREAT 24H UR-RTO: NORMAL MG/G
POTASSIUM SERPL-SCNC: 4.3 MMOL/L
PROT SERPL-MCNC: 7.7 G/DL
SODIUM SERPL-SCNC: 140 MMOL/L

## 2025-02-19 RX ORDER — ERGOCALCIFEROL 1.25 MG/1
1.25 MG CAPSULE, LIQUID FILLED ORAL
Qty: 8 | Refills: 0 | Status: ACTIVE | COMMUNITY
Start: 2025-02-19 | End: 1900-01-01

## 2025-02-27 ENCOUNTER — APPOINTMENT (OUTPATIENT)
Dept: INTERNAL MEDICINE | Facility: CLINIC | Age: 75
End: 2025-02-27
Payer: MEDICARE

## 2025-02-27 VITALS
BODY MASS INDEX: 22.5 KG/M2 | DIASTOLIC BLOOD PRESSURE: 78 MMHG | WEIGHT: 140 LBS | HEIGHT: 66 IN | OXYGEN SATURATION: 95 % | TEMPERATURE: 98.3 F | SYSTOLIC BLOOD PRESSURE: 130 MMHG | RESPIRATION RATE: 16 BRPM | HEART RATE: 109 BPM

## 2025-02-27 VITALS — BODY MASS INDEX: 21.63 KG/M2 | WEIGHT: 134 LBS

## 2025-02-27 DIAGNOSIS — F32.9 MAJOR DEPRESSIVE DISORDER, SINGLE EPISODE, UNSPECIFIED: ICD-10-CM

## 2025-02-27 DIAGNOSIS — M15.9 POLYOSTEOARTHRITIS, UNSPECIFIED: ICD-10-CM

## 2025-02-27 DIAGNOSIS — R70.0 ELEVATED ERYTHROCYTE SEDIMENTATION RATE: ICD-10-CM

## 2025-02-27 DIAGNOSIS — E04.1 NONTOXIC SINGLE THYROID NODULE: ICD-10-CM

## 2025-02-27 DIAGNOSIS — F10.10 ALCOHOL ABUSE, UNCOMPLICATED: ICD-10-CM

## 2025-02-27 DIAGNOSIS — M87.00 IDIOPATHIC ASEPTIC NECROSIS OF UNSPECIFIED BONE: ICD-10-CM

## 2025-02-27 DIAGNOSIS — I10 ESSENTIAL (PRIMARY) HYPERTENSION: ICD-10-CM

## 2025-02-27 DIAGNOSIS — M51.369: ICD-10-CM

## 2025-02-27 DIAGNOSIS — E55.9 VITAMIN D DEFICIENCY, UNSPECIFIED: ICD-10-CM

## 2025-02-27 DIAGNOSIS — R63.4 ABNORMAL WEIGHT LOSS: ICD-10-CM

## 2025-02-27 DIAGNOSIS — M51.26 OTHER INTERVERTEBRAL DISC DISPLACEMENT, LUMBAR REGION: ICD-10-CM

## 2025-02-27 PROCEDURE — G2211 COMPLEX E/M VISIT ADD ON: CPT

## 2025-02-27 PROCEDURE — 99214 OFFICE O/P EST MOD 30 MIN: CPT

## 2025-03-06 ENCOUNTER — APPOINTMENT (OUTPATIENT)
Dept: PULMONOLOGY | Facility: CLINIC | Age: 75
End: 2025-03-06
Payer: MEDICARE

## 2025-03-06 ENCOUNTER — APPOINTMENT (OUTPATIENT)
Dept: ORTHOPEDIC SURGERY | Facility: CLINIC | Age: 75
End: 2025-03-06

## 2025-03-06 VITALS
RESPIRATION RATE: 16 BRPM | SYSTOLIC BLOOD PRESSURE: 120 MMHG | DIASTOLIC BLOOD PRESSURE: 70 MMHG | OXYGEN SATURATION: 98 % | HEART RATE: 96 BPM

## 2025-03-06 VITALS — BODY MASS INDEX: 21.21 KG/M2 | WEIGHT: 132 LBS | HEIGHT: 66 IN

## 2025-03-06 DIAGNOSIS — J44.9 CHRONIC OBSTRUCTIVE PULMONARY DISEASE, UNSPECIFIED: ICD-10-CM

## 2025-03-06 DIAGNOSIS — R06.09 OTHER FORMS OF DYSPNEA: ICD-10-CM

## 2025-03-06 PROCEDURE — 99214 OFFICE O/P EST MOD 30 MIN: CPT

## 2025-03-06 PROCEDURE — G2211 COMPLEX E/M VISIT ADD ON: CPT

## 2025-03-06 RX ORDER — IPRATROPIUM BROMIDE 0.5 MG/2.5ML
0.02 SOLUTION RESPIRATORY (INHALATION) EVERY 4 HOURS
Qty: 1620 | Refills: 3 | Status: ACTIVE | COMMUNITY
Start: 2025-03-06 | End: 1900-01-01

## 2025-03-06 RX ORDER — INHALER,ASSIST DEVICE,MED MASK
SPACER (EA) MISCELLANEOUS
Qty: 1 | Refills: 3 | Status: ACTIVE | COMMUNITY
Start: 2025-03-06 | End: 1900-01-01

## 2025-03-11 ENCOUNTER — OUTPATIENT (OUTPATIENT)
Dept: OUTPATIENT SERVICES | Facility: HOSPITAL | Age: 75
LOS: 1 days | Discharge: HOME | End: 2025-03-11

## 2025-03-11 DIAGNOSIS — D64.9 ANEMIA, UNSPECIFIED: ICD-10-CM

## 2025-03-14 ENCOUNTER — OUTPATIENT (OUTPATIENT)
Dept: OUTPATIENT SERVICES | Facility: HOSPITAL | Age: 75
LOS: 1 days | End: 2025-03-14

## 2025-03-14 ENCOUNTER — APPOINTMENT (OUTPATIENT)
Dept: ULTRASOUND IMAGING | Facility: CLINIC | Age: 75
End: 2025-03-14

## 2025-03-14 DIAGNOSIS — E04.1 NONTOXIC SINGLE THYROID NODULE: ICD-10-CM

## 2025-03-14 PROCEDURE — 76536 US EXAM OF HEAD AND NECK: CPT | Mod: 26

## 2025-03-18 ENCOUNTER — APPOINTMENT (OUTPATIENT)
Dept: ORTHOPEDIC SURGERY | Facility: CLINIC | Age: 75
End: 2025-03-18
Payer: MEDICARE

## 2025-03-18 VITALS
BODY MASS INDEX: 21.21 KG/M2 | WEIGHT: 132 LBS | HEIGHT: 66 IN | DIASTOLIC BLOOD PRESSURE: 80 MMHG | SYSTOLIC BLOOD PRESSURE: 126 MMHG | HEART RATE: 91 BPM

## 2025-03-18 DIAGNOSIS — E11.9 TYPE 2 DIABETES MELLITUS W/OUT COMPLICATIONS: ICD-10-CM

## 2025-03-18 DIAGNOSIS — M48.062 SPINAL STENOSIS, LUMBAR REGION WITH NEUROGENIC CLAUDICATION: ICD-10-CM

## 2025-03-18 DIAGNOSIS — J44.9 CHRONIC OBSTRUCTIVE PULMONARY DISEASE, UNSPECIFIED: ICD-10-CM

## 2025-03-18 PROCEDURE — 72100 X-RAY EXAM L-S SPINE 2/3 VWS: CPT

## 2025-03-18 PROCEDURE — 99204 OFFICE O/P NEW MOD 45 MIN: CPT

## 2025-03-18 RX ORDER — MELOXICAM 15 MG/1
15 TABLET ORAL
Qty: 1 | Refills: 1 | Status: ACTIVE | COMMUNITY
Start: 2025-03-18 | End: 1900-01-01

## 2025-03-18 RX ORDER — OMEPRAZOLE 40 MG/1
40 CAPSULE, DELAYED RELEASE ORAL
Qty: 30 | Refills: 0 | Status: ACTIVE | COMMUNITY
Start: 2025-03-18 | End: 1900-01-01

## 2025-03-21 ENCOUNTER — APPOINTMENT (OUTPATIENT)
Dept: MRI IMAGING | Facility: CLINIC | Age: 75
End: 2025-03-21
Payer: MEDICARE

## 2025-03-21 ENCOUNTER — OUTPATIENT (OUTPATIENT)
Dept: OUTPATIENT SERVICES | Facility: HOSPITAL | Age: 75
LOS: 1 days | End: 2025-03-21

## 2025-03-21 DIAGNOSIS — M48.062 SPINAL STENOSIS, LUMBAR REGION WITH NEUROGENIC CLAUDICATION: ICD-10-CM

## 2025-03-21 PROCEDURE — 72148 MRI LUMBAR SPINE W/O DYE: CPT | Mod: 26

## 2025-03-24 ENCOUNTER — APPOINTMENT (OUTPATIENT)
Dept: INTERNAL MEDICINE | Facility: CLINIC | Age: 75
End: 2025-03-24
Payer: MEDICARE

## 2025-03-24 VITALS
WEIGHT: 132 LBS | DIASTOLIC BLOOD PRESSURE: 68 MMHG | BODY MASS INDEX: 21.21 KG/M2 | RESPIRATION RATE: 16 BRPM | OXYGEN SATURATION: 96 % | HEART RATE: 96 BPM | TEMPERATURE: 98.2 F | SYSTOLIC BLOOD PRESSURE: 110 MMHG | HEIGHT: 66 IN

## 2025-03-24 DIAGNOSIS — E04.1 NONTOXIC SINGLE THYROID NODULE: ICD-10-CM

## 2025-03-24 DIAGNOSIS — F10.10 ALCOHOL ABUSE, UNCOMPLICATED: ICD-10-CM

## 2025-03-24 DIAGNOSIS — F32.9 MAJOR DEPRESSIVE DISORDER, SINGLE EPISODE, UNSPECIFIED: ICD-10-CM

## 2025-03-24 DIAGNOSIS — I10 ESSENTIAL (PRIMARY) HYPERTENSION: ICD-10-CM

## 2025-03-24 DIAGNOSIS — M87.00 IDIOPATHIC ASEPTIC NECROSIS OF UNSPECIFIED BONE: ICD-10-CM

## 2025-03-24 PROCEDURE — 99214 OFFICE O/P EST MOD 30 MIN: CPT

## 2025-03-25 ENCOUNTER — APPOINTMENT (OUTPATIENT)
Dept: HEMATOLOGY ONCOLOGY | Facility: CLINIC | Age: 75
End: 2025-03-25
Payer: MEDICARE

## 2025-03-25 ENCOUNTER — RESULT REVIEW (OUTPATIENT)
Age: 75
End: 2025-03-25

## 2025-03-25 VITALS
SYSTOLIC BLOOD PRESSURE: 142 MMHG | TEMPERATURE: 98.1 F | BODY MASS INDEX: 21.38 KG/M2 | DIASTOLIC BLOOD PRESSURE: 75 MMHG | OXYGEN SATURATION: 95 % | HEIGHT: 66 IN | WEIGHT: 133 LBS | HEART RATE: 92 BPM

## 2025-03-25 DIAGNOSIS — R70.0 ELEVATED ERYTHROCYTE SEDIMENTATION RATE: ICD-10-CM

## 2025-03-25 LAB
BASOPHILS # BLD AUTO: 0.06 K/UL — SIGNIFICANT CHANGE UP (ref 0–0.2)
BASOPHILS NFR BLD AUTO: 1 % — SIGNIFICANT CHANGE UP (ref 0–2)
EOSINOPHIL # BLD AUTO: 0.3 K/UL — SIGNIFICANT CHANGE UP (ref 0–0.5)
EOSINOPHIL NFR BLD AUTO: 4.8 % — SIGNIFICANT CHANGE UP (ref 0–6)
HCT VFR BLD CALC: 40.3 % — SIGNIFICANT CHANGE UP (ref 39–50)
HGB BLD-MCNC: 12.9 G/DL — LOW (ref 13–17)
IMM GRANULOCYTES # BLD AUTO: 0.01 K/UL — SIGNIFICANT CHANGE UP (ref 0–0.07)
IMM GRANULOCYTES NFR BLD AUTO: 0.2 % — SIGNIFICANT CHANGE UP (ref 0–0.9)
LYMPHOCYTES # BLD AUTO: 1.91 K/UL — SIGNIFICANT CHANGE UP (ref 1–3.3)
LYMPHOCYTES NFR BLD AUTO: 30.8 % — SIGNIFICANT CHANGE UP (ref 13–44)
MCHC RBC-ENTMCNC: 30.3 PG — SIGNIFICANT CHANGE UP (ref 27–34)
MCHC RBC-ENTMCNC: 32 G/DL — SIGNIFICANT CHANGE UP (ref 32–36)
MCV RBC AUTO: 94.6 FL — SIGNIFICANT CHANGE UP (ref 80–100)
MONOCYTES # BLD AUTO: 0.46 K/UL — SIGNIFICANT CHANGE UP (ref 0–0.9)
MONOCYTES NFR BLD AUTO: 7.4 % — SIGNIFICANT CHANGE UP (ref 2–14)
NEUTROPHILS # BLD AUTO: 3.47 K/UL — SIGNIFICANT CHANGE UP (ref 1.8–7.4)
NEUTROPHILS NFR BLD AUTO: 55.8 % — SIGNIFICANT CHANGE UP (ref 43–77)
NRBC # BLD AUTO: 0 K/UL — SIGNIFICANT CHANGE UP (ref 0–0)
NRBC # FLD: 0 K/UL — SIGNIFICANT CHANGE UP (ref 0–0)
NRBC BLD AUTO-RTO: 0 /100 WBCS — SIGNIFICANT CHANGE UP (ref 0–0)
PLATELET # BLD AUTO: 206 K/UL — SIGNIFICANT CHANGE UP (ref 150–400)
PMV BLD: 9.9 FL — SIGNIFICANT CHANGE UP (ref 7–13)
RBC # BLD: 4.26 M/UL — SIGNIFICANT CHANGE UP (ref 4.2–5.8)
RBC # FLD: 13.9 % — SIGNIFICANT CHANGE UP (ref 10.3–14.5)
WBC # BLD: 6.21 K/UL — SIGNIFICANT CHANGE UP (ref 3.8–10.5)
WBC # FLD AUTO: 6.21 K/UL — SIGNIFICANT CHANGE UP (ref 3.8–10.5)

## 2025-03-25 PROCEDURE — 99213 OFFICE O/P EST LOW 20 MIN: CPT

## 2025-03-26 LAB
AFP-TM SERPL-MCNC: 3 NG/ML
CANCER AG19-9 SERPL-ACNC: 43 U/ML
CEA SERPL-MCNC: 4.3 NG/ML

## 2025-03-27 ENCOUNTER — LABORATORY RESULT (OUTPATIENT)
Age: 75
End: 2025-03-27

## 2025-04-04 ENCOUNTER — APPOINTMENT (OUTPATIENT)
Dept: ORTHOPEDIC SURGERY | Facility: CLINIC | Age: 75
End: 2025-04-04

## 2025-04-10 RX ORDER — BLOOD-GLUCOSE METER
KIT MISCELLANEOUS
Qty: 1 | Refills: 0 | Status: ACTIVE | COMMUNITY
Start: 2025-04-10 | End: 1900-01-01

## 2025-04-15 ENCOUNTER — APPOINTMENT (OUTPATIENT)
Dept: INTERNAL MEDICINE | Facility: CLINIC | Age: 75
End: 2025-04-15
Payer: MEDICARE

## 2025-04-15 VITALS
DIASTOLIC BLOOD PRESSURE: 70 MMHG | HEIGHT: 66 IN | TEMPERATURE: 98 F | OXYGEN SATURATION: 98 % | HEART RATE: 110 BPM | WEIGHT: 125 LBS | BODY MASS INDEX: 20.09 KG/M2 | SYSTOLIC BLOOD PRESSURE: 130 MMHG

## 2025-04-15 DIAGNOSIS — M87.00 IDIOPATHIC ASEPTIC NECROSIS OF UNSPECIFIED BONE: ICD-10-CM

## 2025-04-15 DIAGNOSIS — R63.4 ABNORMAL WEIGHT LOSS: ICD-10-CM

## 2025-04-15 DIAGNOSIS — Z86.39 PERSONAL HISTORY OF OTHER ENDOCRINE, NUTRITIONAL AND METABOLIC DISEASE: ICD-10-CM

## 2025-04-15 DIAGNOSIS — J44.9 CHRONIC OBSTRUCTIVE PULMONARY DISEASE, UNSPECIFIED: ICD-10-CM

## 2025-04-15 DIAGNOSIS — I10 ESSENTIAL (PRIMARY) HYPERTENSION: ICD-10-CM

## 2025-04-15 DIAGNOSIS — F32.9 MAJOR DEPRESSIVE DISORDER, SINGLE EPISODE, UNSPECIFIED: ICD-10-CM

## 2025-04-15 PROCEDURE — 99214 OFFICE O/P EST MOD 30 MIN: CPT

## 2025-04-18 DIAGNOSIS — E44.1 MILD PROTEIN-CALORIE MALNUTRITION: ICD-10-CM

## 2025-04-24 ENCOUNTER — APPOINTMENT (OUTPATIENT)
Dept: ORTHOPEDIC SURGERY | Facility: CLINIC | Age: 75
End: 2025-04-24
Payer: MEDICARE

## 2025-04-24 VITALS
HEIGHT: 66 IN | WEIGHT: 125 LBS | HEART RATE: 79 BPM | SYSTOLIC BLOOD PRESSURE: 125 MMHG | BODY MASS INDEX: 20.09 KG/M2 | DIASTOLIC BLOOD PRESSURE: 68 MMHG

## 2025-04-24 DIAGNOSIS — J43.9 EMPHYSEMA, UNSPECIFIED: ICD-10-CM

## 2025-04-24 DIAGNOSIS — M51.369: ICD-10-CM

## 2025-04-24 PROCEDURE — 99214 OFFICE O/P EST MOD 30 MIN: CPT

## 2025-05-01 ENCOUNTER — APPOINTMENT (OUTPATIENT)
Dept: HEMATOLOGY ONCOLOGY | Facility: CLINIC | Age: 75
End: 2025-05-01

## 2025-05-07 ENCOUNTER — OUTPATIENT (OUTPATIENT)
Dept: OUTPATIENT SERVICES | Facility: HOSPITAL | Age: 75
LOS: 1 days | Discharge: HOME | End: 2025-05-07

## 2025-05-07 DIAGNOSIS — H53.9 UNSPECIFIED VISUAL DISTURBANCE: ICD-10-CM

## 2025-05-07 DIAGNOSIS — D64.9 ANEMIA, UNSPECIFIED: ICD-10-CM

## 2025-05-09 ENCOUNTER — APPOINTMENT (OUTPATIENT)
Dept: HEMATOLOGY ONCOLOGY | Facility: CLINIC | Age: 75
End: 2025-05-09

## 2025-05-15 ENCOUNTER — APPOINTMENT (OUTPATIENT)
Dept: INTERNAL MEDICINE | Facility: CLINIC | Age: 75
End: 2025-05-15
Payer: MEDICARE

## 2025-05-15 VITALS
RESPIRATION RATE: 16 BRPM | HEIGHT: 66 IN | WEIGHT: 124 LBS | HEART RATE: 91 BPM | DIASTOLIC BLOOD PRESSURE: 70 MMHG | BODY MASS INDEX: 19.93 KG/M2 | OXYGEN SATURATION: 98 % | SYSTOLIC BLOOD PRESSURE: 100 MMHG | TEMPERATURE: 98 F

## 2025-05-15 DIAGNOSIS — R63.4 ABNORMAL WEIGHT LOSS: ICD-10-CM

## 2025-05-15 DIAGNOSIS — I10 ESSENTIAL (PRIMARY) HYPERTENSION: ICD-10-CM

## 2025-05-15 DIAGNOSIS — F10.10 ALCOHOL ABUSE, UNCOMPLICATED: ICD-10-CM

## 2025-05-15 DIAGNOSIS — E11.9 TYPE 2 DIABETES MELLITUS W/OUT COMPLICATIONS: ICD-10-CM

## 2025-05-15 PROCEDURE — 99214 OFFICE O/P EST MOD 30 MIN: CPT

## 2025-05-15 PROCEDURE — 36415 COLL VENOUS BLD VENIPUNCTURE: CPT

## 2025-05-15 PROCEDURE — G2211 COMPLEX E/M VISIT ADD ON: CPT

## 2025-05-16 ENCOUNTER — APPOINTMENT (OUTPATIENT)
Dept: HEMATOLOGY ONCOLOGY | Facility: CLINIC | Age: 75
End: 2025-05-16

## 2025-05-16 LAB
ALBUMIN SERPL ELPH-MCNC: 4.5 G/DL
ALP BLD-CCNC: 69 U/L
ALT SERPL-CCNC: 38 U/L
ANION GAP SERPL CALC-SCNC: 17 MMOL/L
AST SERPL-CCNC: 52 U/L
BASOPHILS # BLD AUTO: 0.06 K/UL
BASOPHILS NFR BLD AUTO: 0.9 %
BILIRUB SERPL-MCNC: 0.3 MG/DL
BUN SERPL-MCNC: 17 MG/DL
CALCIUM SERPL-MCNC: 10.1 MG/DL
CHLORIDE SERPL-SCNC: 100 MMOL/L
CO2 SERPL-SCNC: 21 MMOL/L
CREAT SERPL-MCNC: 1.25 MG/DL
EGFRCR SERPLBLD CKD-EPI 2021: 61 ML/MIN/1.73M2
EOSINOPHIL # BLD AUTO: 0.27 K/UL
EOSINOPHIL NFR BLD AUTO: 3.9 %
ESTIMATED AVERAGE GLUCOSE: 137 MG/DL
GLUCOSE SERPL-MCNC: 144 MG/DL
HBA1C MFR BLD HPLC: 6.4 %
HCT VFR BLD CALC: 42 %
HGB BLD-MCNC: 13.2 G/DL
IMM GRANULOCYTES NFR BLD AUTO: 0.1 %
LYMPHOCYTES # BLD AUTO: 2.56 K/UL
LYMPHOCYTES NFR BLD AUTO: 36.8 %
MAN DIFF?: NORMAL
MCHC RBC-ENTMCNC: 30.3 PG
MCHC RBC-ENTMCNC: 31.4 G/DL
MCV RBC AUTO: 96.3 FL
MONOCYTES # BLD AUTO: 0.59 K/UL
MONOCYTES NFR BLD AUTO: 8.5 %
NEUTROPHILS # BLD AUTO: 3.46 K/UL
NEUTROPHILS NFR BLD AUTO: 49.8 %
PLATELET # BLD AUTO: 271 K/UL
POTASSIUM SERPL-SCNC: 4.9 MMOL/L
PROT SERPL-MCNC: 7.5 G/DL
RBC # BLD: 4.36 M/UL
RBC # FLD: 13.9 %
SODIUM SERPL-SCNC: 138 MMOL/L
WBC # FLD AUTO: 6.95 K/UL

## 2025-05-20 ENCOUNTER — APPOINTMENT (OUTPATIENT)
Dept: PULMONOLOGY | Facility: CLINIC | Age: 75
End: 2025-05-20

## 2025-06-19 ENCOUNTER — OUTPATIENT (OUTPATIENT)
Dept: OUTPATIENT SERVICES | Facility: HOSPITAL | Age: 75
LOS: 1 days | End: 2025-06-19

## 2025-06-19 ENCOUNTER — APPOINTMENT (OUTPATIENT)
Dept: NUCLEAR MEDICINE | Facility: CLINIC | Age: 75
End: 2025-06-19
Payer: MEDICARE

## 2025-06-19 DIAGNOSIS — R63.4 ABNORMAL WEIGHT LOSS: ICD-10-CM

## 2025-06-19 PROCEDURE — 78815 PET IMAGE W/CT SKULL-THIGH: CPT | Mod: 26,PI

## 2025-06-23 ENCOUNTER — APPOINTMENT (OUTPATIENT)
Dept: PULMONOLOGY | Facility: CLINIC | Age: 75
End: 2025-06-23
Payer: MEDICARE

## 2025-06-23 VITALS
SYSTOLIC BLOOD PRESSURE: 102 MMHG | WEIGHT: 120 LBS | HEART RATE: 97 BPM | HEIGHT: 66 IN | OXYGEN SATURATION: 98 % | DIASTOLIC BLOOD PRESSURE: 62 MMHG | BODY MASS INDEX: 19.29 KG/M2 | RESPIRATION RATE: 16 BRPM

## 2025-06-23 PROBLEM — R93.89 ABNORMAL CAT SCAN: Status: ACTIVE | Noted: 2025-06-23

## 2025-06-23 PROCEDURE — 99214 OFFICE O/P EST MOD 30 MIN: CPT | Mod: 25

## 2025-06-23 PROCEDURE — 94010 BREATHING CAPACITY TEST: CPT

## 2025-07-09 ENCOUNTER — APPOINTMENT (OUTPATIENT)
Dept: NEUROLOGY | Facility: CLINIC | Age: 75
End: 2025-07-09

## 2025-07-22 ENCOUNTER — APPOINTMENT (OUTPATIENT)
Dept: CARDIOLOGY | Facility: CLINIC | Age: 75
End: 2025-07-22
Payer: MEDICARE

## 2025-07-22 VITALS — DIASTOLIC BLOOD PRESSURE: 76 MMHG | SYSTOLIC BLOOD PRESSURE: 132 MMHG

## 2025-07-22 VITALS
BODY MASS INDEX: 18.96 KG/M2 | SYSTOLIC BLOOD PRESSURE: 128 MMHG | OXYGEN SATURATION: 97 % | WEIGHT: 118 LBS | HEART RATE: 89 BPM | DIASTOLIC BLOOD PRESSURE: 74 MMHG | HEIGHT: 66 IN

## 2025-07-22 DIAGNOSIS — I25.10 ATHEROSCLEROTIC HEART DISEASE OF NATIVE CORONARY ARTERY W/OUT ANGINA PECTORIS: ICD-10-CM

## 2025-07-22 DIAGNOSIS — R63.4 ABNORMAL WEIGHT LOSS: ICD-10-CM

## 2025-07-22 DIAGNOSIS — R06.09 OTHER FORMS OF DYSPNEA: ICD-10-CM

## 2025-07-22 DIAGNOSIS — J44.9 CHRONIC OBSTRUCTIVE PULMONARY DISEASE, UNSPECIFIED: ICD-10-CM

## 2025-07-22 DIAGNOSIS — E44.1 MILD PROTEIN-CALORIE MALNUTRITION: ICD-10-CM

## 2025-07-22 PROCEDURE — G2211 COMPLEX E/M VISIT ADD ON: CPT

## 2025-07-22 PROCEDURE — 93000 ELECTROCARDIOGRAM COMPLETE: CPT

## 2025-07-22 PROCEDURE — 99204 OFFICE O/P NEW MOD 45 MIN: CPT

## 2025-08-05 DIAGNOSIS — H26.9 UNSPECIFIED CATARACT: ICD-10-CM

## 2025-08-13 ENCOUNTER — APPOINTMENT (OUTPATIENT)
Dept: INTERNAL MEDICINE | Facility: CLINIC | Age: 75
End: 2025-08-13

## 2025-08-14 ENCOUNTER — APPOINTMENT (OUTPATIENT)
Dept: NEUROLOGY | Facility: CLINIC | Age: 75
End: 2025-08-14

## 2025-08-14 VITALS
BODY MASS INDEX: 18.16 KG/M2 | HEIGHT: 66 IN | WEIGHT: 113 LBS | SYSTOLIC BLOOD PRESSURE: 129 MMHG | HEART RATE: 95 BPM | DIASTOLIC BLOOD PRESSURE: 78 MMHG | OXYGEN SATURATION: 96 %

## 2025-08-14 DIAGNOSIS — R41.3 OTHER AMNESIA: ICD-10-CM

## 2025-08-14 DIAGNOSIS — F32.9 MAJOR DEPRESSIVE DISORDER, SINGLE EPISODE, UNSPECIFIED: ICD-10-CM

## 2025-08-14 PROCEDURE — 99205 OFFICE O/P NEW HI 60 MIN: CPT

## 2025-08-15 ENCOUNTER — APPOINTMENT (OUTPATIENT)
Dept: CARDIOLOGY | Facility: CLINIC | Age: 75
End: 2025-08-15

## 2025-08-26 RX ORDER — BLOOD-GLUCOSE METER
W/DEVICE EACH MISCELLANEOUS
Qty: 1 | Refills: 0 | Status: ACTIVE | COMMUNITY
Start: 2025-08-26 | End: 1900-01-01

## 2025-08-27 RX ORDER — BLOOD SUGAR DIAGNOSTIC
STRIP MISCELLANEOUS
Qty: 90 | Refills: 3 | Status: ACTIVE | COMMUNITY
Start: 2025-08-26 | End: 1900-01-01

## 2025-08-27 RX ORDER — LANCETS
EACH MISCELLANEOUS
Qty: 90 | Refills: 3 | Status: ACTIVE | COMMUNITY
Start: 2025-08-26 | End: 1900-01-01

## 2025-09-15 ENCOUNTER — APPOINTMENT (OUTPATIENT)
Dept: CT IMAGING | Facility: CLINIC | Age: 75
End: 2025-09-15

## 2025-09-17 ENCOUNTER — APPOINTMENT (OUTPATIENT)
Dept: CARDIOLOGY | Facility: CLINIC | Age: 75
End: 2025-09-17

## 2025-09-18 ENCOUNTER — APPOINTMENT (OUTPATIENT)
Dept: NEUROLOGY | Facility: CLINIC | Age: 75
End: 2025-09-18